# Patient Record
Sex: MALE | Race: WHITE | NOT HISPANIC OR LATINO | Employment: FULL TIME | URBAN - NONMETROPOLITAN AREA
[De-identification: names, ages, dates, MRNs, and addresses within clinical notes are randomized per-mention and may not be internally consistent; named-entity substitution may affect disease eponyms.]

---

## 2017-01-12 ENCOUNTER — OFFICE VISIT (OUTPATIENT)
Dept: ORTHOPEDIC SURGERY | Facility: CLINIC | Age: 32
End: 2017-01-12

## 2017-01-12 DIAGNOSIS — M67.921 TENDINOPATHY OF ELBOW, RIGHT: Primary | ICD-10-CM

## 2017-01-12 PROCEDURE — 99213 OFFICE O/P EST LOW 20 MIN: CPT | Performed by: ORTHOPAEDIC SURGERY

## 2017-01-12 NOTE — PROGRESS NOTES
Chief Complaint   Patient presents with   • Right Elbow - Follow-up           HPI  Patient is here for a follow up of his right elbow.  He states he is hoping to be able to go back to work.  He has had a lateral elbow reconstruction with resection of the granulation tissue and reconstruction of the extensor, supinator muscle mass.  He is doing well post reconstruction.  His pain is settled down nicely.  He has a near normal range of motion.  He can flex his elbow from 0-130°.  Pronation 0-90°.  Supination 0-90°.  Her vascular compromise.        There were no vitals filed for this visit.        Review of Systems   Constitutional: Negative for chills, diaphoresis, fever and unexpected weight change.   HENT: Negative for hearing loss, nosebleeds, sore throat and tinnitus.    Eyes: Negative for pain and visual disturbance.   Respiratory: Negative for cough, shortness of breath and wheezing.    Cardiovascular: Negative for chest pain and palpitations.   Gastrointestinal: Negative for abdominal pain, diarrhea, nausea and vomiting.   Endocrine: Negative for cold intolerance, heat intolerance and polydipsia.   Genitourinary: Negative for difficulty urinating, dysuria and hematuria.   Musculoskeletal: Negative for arthralgias, joint swelling and myalgias.   Skin: Negative for rash and wound.   Allergic/Immunologic: Negative for environmental allergies.   Neurological: Negative for dizziness, syncope and numbness.   Hematological: Does not bruise/bleed easily.   Psychiatric/Behavioral: Negative for dysphoric mood and sleep disturbance. The patient is not nervous/anxious.            Physical Exam   Constitutional: He appears well-developed.   HENT:   Head: Normocephalic.   Nose: Nose normal.   Eyes: Conjunctivae are normal. Pupils are equal, round, and reactive to light.   Neck: Normal range of motion.   Cardiovascular: Normal rate and intact distal pulses.    Pulmonary/Chest: Effort normal and breath sounds normal.    Abdominal: Soft. Bowel sounds are normal.   Musculoskeletal: Normal range of motion.   Neurological: He is alert. He has normal reflexes.   Skin: Skin is warm and dry.   Psychiatric: He has a normal mood and affect. His behavior is normal. Judgment and thought content normal.   Vitals reviewed.          Joint/Body Part Specific Exam:  Right elbow:right elbow. lateral Relationship of 3 bony points is well preserved.  The laterally based incision is completely healed.  There is no evidence of posterior interosseous nerve palsy. Mild effusion is noted of the elbow. There is no ulnar neuritis. Patient has a lot of pain and tenderness over the lateral/medial aspect of the elbow. Range of motion of the elbow is 0-130 degrees of flexion, 0-90 degrees of pronation, 0-90 degrees of supination. Extension of the wrist against resistance bothers the patient significantly. Radial artery pulses are palpable. Skin and soft tissues are slightly swollen. There is point tenderness over the flexor pronator muscle mass/extensor supinator muscle mass.      X-RAY Report:        Diagnostics:        Juan M was seen today for follow-up.    Diagnoses and all orders for this visit:    Tendinopathy of elbow, right            Procedures        Plan:  Home based exercise program with stretching and strengthening of the elbow.  Work on biceps and triceps strengthening.  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  Avoid repetitive loading and reinjury precautions at work.  Use a brace if needed.  Follow-up in my office in 3 months.  Tablet ibuprofen 6 mg tab 1 by mouth twice a day when necessary pain.  Okay to apply a local topical analgesic gel if needed for pain and inflammation.

## 2017-01-12 NOTE — MR AVS SNAPSHOT
Juan M Lynch   2017 1:15 PM   Office Visit    Dept Phone:  [phone]   Encounter #:  [id number]    Provider:  Red Gross MD   Department:  [facility]                Your Full Care Plan              Your Updated Medication List          This list is accurate as of: 17  2:23 PM.  Always use your most recent med list.                naproxen sodium 220 MG tablet   Commonly known as:  ALEVE               You Were Diagnosed With        Codes Comments    Tendinopathy of elbow, right    -  Primary ICD-10-CM: M67.921  ICD-9-CM: 727.9       Instructions     None    Patient Instructions History      Upcoming Appointments     Visit Type Date Time Department    FOLLOW UP 2017  1:15 PM [facility]    FOLLOW UP 2017  1:45 PM [facility]      Healthcare IThart Signup     [facility] [employer] allows you to send messages to your doctor, view your test results, renew your prescriptions, schedule appointments, and more. To sign up, go to [employer]ove and click on the Sign Up Now link in the New User? box. Enter your [employer] Activation Code exactly as it appears below along with the last four digits of your Social Security Number and your Date of Birth () to complete the sign-up process. If you do not sign up before the expiration date, you must request a new code.    [employer] Activation Code: [email or web address]  Expires: 2017  2:23 PM    If you have questions, you can email [email or web address] or call [phone] to talk to our [employer] staff. Remember, [employer] is NOT to be used for urgent needs. For medical emergencies, dial 911.               Other Info from Your Visit           Your Appointments     2017  1:45 PM EDT   Follow Up with Red Gross MD   [facility] (--)    [address] [place] [address] [address]  [facility] [address]      [phone]           Arrive 15 minutes prior to appointment.              Allergies     Guaifenesin        Reason for Visit     Right Elbow - Follow-up           Vital Signs     Smoking Status                   Never Smoker           Problems and Diagnoses Noted     Tendinopathy of elbow

## 2020-06-19 ENCOUNTER — OFFICE VISIT CONVERTED (OUTPATIENT)
Dept: FAMILY MEDICINE CLINIC | Age: 35
End: 2020-06-19
Attending: NURSE PRACTITIONER

## 2021-07-02 VITALS
DIASTOLIC BLOOD PRESSURE: 61 MMHG | HEART RATE: 68 BPM | SYSTOLIC BLOOD PRESSURE: 109 MMHG | HEIGHT: 64 IN | BODY MASS INDEX: 24.89 KG/M2 | WEIGHT: 145.8 LBS | TEMPERATURE: 97.6 F

## 2021-07-23 ENCOUNTER — OFFICE VISIT (OUTPATIENT)
Dept: FAMILY MEDICINE CLINIC | Age: 36
End: 2021-07-23

## 2021-07-23 VITALS
WEIGHT: 150 LBS | BODY MASS INDEX: 25.61 KG/M2 | HEART RATE: 64 BPM | DIASTOLIC BLOOD PRESSURE: 91 MMHG | TEMPERATURE: 98.2 F | SYSTOLIC BLOOD PRESSURE: 140 MMHG | HEIGHT: 64 IN

## 2021-07-23 DIAGNOSIS — M25.511 CHRONIC RIGHT SHOULDER PAIN: ICD-10-CM

## 2021-07-23 DIAGNOSIS — G89.29 CHRONIC RIGHT SHOULDER PAIN: ICD-10-CM

## 2021-07-23 DIAGNOSIS — R68.84 JAW PAIN: ICD-10-CM

## 2021-07-23 DIAGNOSIS — R53.83 OTHER FATIGUE: Primary | ICD-10-CM

## 2021-07-23 PROCEDURE — 99213 OFFICE O/P EST LOW 20 MIN: CPT | Performed by: NURSE PRACTITIONER

## 2021-07-23 RX ORDER — IBUPROFEN 200 MG
200 TABLET ORAL EVERY 6 HOURS PRN
COMMUNITY
End: 2022-08-01

## 2021-07-23 NOTE — ASSESSMENT & PLAN NOTE
Exam and history consistent with temporomandibular joint syndrome.  I do think it is okay to complete the steroid prescription from his dentist Dr. Mosqueda well.  Flexeril is also okay to use but it may cause drowsiness and he may choose to take it at bedtime.  Avoid chewing gum and follow-up if not improving.

## 2021-07-23 NOTE — PROGRESS NOTES
Chief Complaint  Jaw Pain (x a few weeks), GI Problem (frequent bowel movements, after certain foods), Fatigue, and Shoulder Pain (right )    Subjective  Juan M is a 36-year-old male here today for complaints of fatigue for more than 6 months.  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  He does not enjoy his work environment and work is stressful.  Amount of sleep has not really changed but he is wondering if maybe he should be tested for sleep apnea not aware of any distinct snoring to his knowledge.  He denies any specific depression or anxiety but does admit to increased stress level.  In addition he fell in the snow in February and since that time has had some it ongoing right shoulder pain and he feels with certain movements that the shoulder crackles and pops.  He would like to know what would be the best intervention to further evaluate this shoulder pain.  He has not had any x-ray imaging done.  Also he slept on an air mattress 2 weeks ago and since that time he has had some pain along the left jaw and he noted upon awakening that his jaw had been hyperextended while he was asleep.  He did see Rahul garcia on Monday and received a prescription for steroids and Flexeril.  Patient has not started those prescriptions he wanted a second opinion on if they were necessary.        Juan M Lynch presents to [facility]    Review of Systems   Constitutional: Positive for fatigue. Negative for chills and fever.   Respiratory: Negative.    Cardiovascular: Negative.    Musculoskeletal:        Left pain along the temporomandibular joint x2 weeks and intermittent right shoulder pain since February   Neurological: Negative.    Psychiatric/Behavioral: Positive for sleep disturbance.        Increased stress level        Objective   Vital Signs:   Vitals:    07/23/21 1128 07/23/21 1133   BP: 139/91 140/91   BP Location: Right arm Right arm   Patient Position:  "Sitting Sitting   Pulse: 64 64   Temp: 98.2 °F (36.8 °C)    TempSrc: Oral    Weight: 68 kg (150 lb)    Height: 162.6 cm (64\")    PainSc: 0-No pain       Physical Exam  Vitals reviewed.   Constitutional:       General: He is not in acute distress.     Appearance: Normal appearance. He is well-developed.   HENT:      Right Ear: Tympanic membrane normal.      Left Ear: Tympanic membrane normal.   Cardiovascular:      Rate and Rhythm: Normal rate and regular rhythm.      Heart sounds: Normal heart sounds.   Pulmonary:      Effort: Pulmonary effort is normal.      Breath sounds: Normal breath sounds.   Musculoskeletal:      Right lower leg: No edema.      Left lower leg: No edema.      Comments: Full range of motion with right shoulder but mild crepitus noted.  And mild tenderness noted along the upper left temporomandibular joint with opening and closing of his jaw   Skin:     General: Skin is warm and dry.   Neurological:      General: No focal deficit present.      Mental Status: He is alert.   Psychiatric:         Attention and Perception: Attention normal.         Mood and Affect: Mood and affect normal.         Behavior: Behavior normal.          Result Review :              Assessment and Plan    Diagnoses and all orders for this visit:    1. Other fatigue (Primary)  Assessment & Plan:  Recommend labs and okay to refer for sleep study if labs are negative.  Also monitor for symptoms of anxiety or depression as those could also cause fatigue.  Further treatment pending lab results and follow-up if not improving    Orders:  -     CBC & Differential  -     Comprehensive Metabolic Panel  -     TSH  -     Vitamin B12  -     Testosterone    2. Chronic right shoulder pain  Assessment & Plan:  Rest and ice.  X-rays pending.  Steroid taking for temporomandibular joint syndrome may ease his shoulder symptoms as well.  Further treatment pending x-ray results    Orders:  -     XR Shoulder 2+ View Right; Future    3. Jaw " pain  Assessment & Plan:  Exam and history consistent with temporomandibular joint syndrome.  I do think it is okay to complete the steroid prescription from his dentist Dr. Mosqueda well.  Flexeril is also okay to use but it may cause drowsiness and he may choose to take it at bedtime.  Avoid chewing gum and follow-up if not improving.        Follow Up    No follow-ups on file.  Patient was given instructions and counseling regarding his condition or for health maintenance advice. Please see specific information pulled into the AVS if appropriate.

## 2021-07-23 NOTE — ASSESSMENT & PLAN NOTE
Recommend labs and okay to refer for sleep study if labs are negative.  Also monitor for symptoms of anxiety or depression as those could also cause fatigue.  Further treatment pending lab results and follow-up if not improving

## 2021-07-23 NOTE — ASSESSMENT & PLAN NOTE
Rest and ice.  X-rays pending.  Steroid taking for temporomandibular joint syndrome may ease his shoulder symptoms as well.  Further treatment pending x-ray results

## 2021-07-26 ENCOUNTER — TELEPHONE (OUTPATIENT)
Dept: FAMILY MEDICINE CLINIC | Age: 36
End: 2021-07-26

## 2021-07-26 ENCOUNTER — HOSPITAL ENCOUNTER (OUTPATIENT)
Dept: GENERAL RADIOLOGY | Facility: HOSPITAL | Age: 36
Discharge: HOME | End: 2021-07-26

## 2021-07-26 ENCOUNTER — LAB (OUTPATIENT)
Dept: LAB | Facility: HOSPITAL | Age: 36
End: 2021-07-26

## 2021-07-26 DIAGNOSIS — M25.511 CHRONIC RIGHT SHOULDER PAIN: ICD-10-CM

## 2021-07-26 DIAGNOSIS — G89.29 CHRONIC RIGHT SHOULDER PAIN: ICD-10-CM

## 2021-07-26 DIAGNOSIS — R53.83 OTHER FATIGUE: Primary | ICD-10-CM

## 2021-07-26 LAB
ALBUMIN SERPL-MCNC: 4.3 G/DL (ref 3.5–5.2)
ALBUMIN/GLOB SERPL: 1.9 G/DL
ALP SERPL-CCNC: 66 U/L (ref 39–117)
ALT SERPL W P-5'-P-CCNC: 37 U/L (ref 1–41)
ANION GAP SERPL CALCULATED.3IONS-SCNC: 9.5 MMOL/L (ref 5–15)
AST SERPL-CCNC: 26 U/L (ref 1–40)
BASOPHILS # BLD AUTO: 0.03 10*3/MM3 (ref 0–0.2)
BASOPHILS NFR BLD AUTO: 0.6 % (ref 0–1.5)
BILIRUB SERPL-MCNC: 0.3 MG/DL (ref 0–1.2)
BUN SERPL-MCNC: 16 MG/DL (ref 6–20)
BUN/CREAT SERPL: 17 (ref 7–25)
CALCIUM SPEC-SCNC: 8.9 MG/DL (ref 8.6–10.5)
CHLORIDE SERPL-SCNC: 105 MMOL/L (ref 98–107)
CO2 SERPL-SCNC: 26.5 MMOL/L (ref 22–29)
CREAT SERPL-MCNC: 0.94 MG/DL (ref 0.76–1.27)
DEPRECATED RDW RBC AUTO: 43.3 FL (ref 37–54)
EOSINOPHIL # BLD AUTO: 0.27 10*3/MM3 (ref 0–0.4)
EOSINOPHIL NFR BLD AUTO: 5.5 % (ref 0.3–6.2)
ERYTHROCYTE [DISTWIDTH] IN BLOOD BY AUTOMATED COUNT: 13.1 % (ref 12.3–15.4)
GFR SERPL CREATININE-BSD FRML MDRD: 90 ML/MIN/1.73
GLOBULIN UR ELPH-MCNC: 2.3 GM/DL
GLUCOSE SERPL-MCNC: 122 MG/DL (ref 65–99)
HCT VFR BLD AUTO: 42.3 % (ref 37.5–51)
HGB BLD-MCNC: 14.5 G/DL (ref 13–17.7)
HOLD SPECIMEN: NORMAL
IMM GRANULOCYTES # BLD AUTO: 0.01 10*3/MM3 (ref 0–0.05)
IMM GRANULOCYTES NFR BLD AUTO: 0.2 % (ref 0–0.5)
LYMPHOCYTES # BLD AUTO: 1.74 10*3/MM3 (ref 0.7–3.1)
LYMPHOCYTES NFR BLD AUTO: 35.4 % (ref 19.6–45.3)
MCH RBC QN AUTO: 30.7 PG (ref 26.6–33)
MCHC RBC AUTO-ENTMCNC: 34.3 G/DL (ref 31.5–35.7)
MCV RBC AUTO: 89.4 FL (ref 79–97)
MONOCYTES # BLD AUTO: 0.37 10*3/MM3 (ref 0.1–0.9)
MONOCYTES NFR BLD AUTO: 7.5 % (ref 5–12)
NEUTROPHILS NFR BLD AUTO: 2.49 10*3/MM3 (ref 1.7–7)
NEUTROPHILS NFR BLD AUTO: 50.8 % (ref 42.7–76)
PLATELET # BLD AUTO: 219 10*3/MM3 (ref 140–450)
PMV BLD AUTO: 9.6 FL (ref 6–12)
POTASSIUM SERPL-SCNC: 4.1 MMOL/L (ref 3.5–5.2)
PROT SERPL-MCNC: 6.6 G/DL (ref 6–8.5)
RBC # BLD AUTO: 4.73 10*6/MM3 (ref 4.14–5.8)
SODIUM SERPL-SCNC: 141 MMOL/L (ref 136–145)
TESTOST SERPL-MCNC: 375 NG/DL (ref 249–836)
TSH SERPL DL<=0.05 MIU/L-ACNC: 0.57 UIU/ML (ref 0.27–4.2)
VIT B12 BLD-MCNC: 514 PG/ML (ref 211–946)
WBC # BLD AUTO: 4.91 10*3/MM3 (ref 3.4–10.8)

## 2021-07-26 PROCEDURE — 82607 VITAMIN B-12: CPT | Performed by: NURSE PRACTITIONER

## 2021-07-26 PROCEDURE — 84403 ASSAY OF TOTAL TESTOSTERONE: CPT | Performed by: NURSE PRACTITIONER

## 2021-07-26 PROCEDURE — 73030 X-RAY EXAM OF SHOULDER: CPT

## 2021-07-26 PROCEDURE — 36415 COLL VENOUS BLD VENIPUNCTURE: CPT

## 2021-07-26 PROCEDURE — 84443 ASSAY THYROID STIM HORMONE: CPT | Performed by: NURSE PRACTITIONER

## 2021-07-26 PROCEDURE — 85025 COMPLETE CBC W/AUTO DIFF WBC: CPT | Performed by: NURSE PRACTITIONER

## 2021-07-26 PROCEDURE — 80053 COMPREHEN METABOLIC PANEL: CPT | Performed by: NURSE PRACTITIONER

## 2021-07-27 NOTE — PROGRESS NOTES
Some arthritic / degenerative change of ac joint of shoulder (acromioclavicular joint).  PT is advised for evaluation.  Then if not improving with PT will need to refer to ortho.

## 2021-07-27 NOTE — PROGRESS NOTES
Normal testosterone, thyroid, kidney and liver function.  Glucose mildly elevated but may not have been a fasting specimen.  Suggest repeating a fasting glucose level in the future.  You are not anemic.  Vitamin b12 level is normal.

## 2021-07-30 ENCOUNTER — TELEPHONE (OUTPATIENT)
Dept: FAMILY MEDICINE CLINIC | Age: 36
End: 2021-07-30

## 2021-07-30 DIAGNOSIS — R19.7 DIARRHEA, UNSPECIFIED TYPE: Primary | ICD-10-CM

## 2021-08-02 NOTE — TELEPHONE ENCOUNTER
Ok to refer to ortho of his choice for evaluation of right shoulder pain.  Regarding IBS concerns,  it often focuses on learning triggers of IBS.  Whether food or stress, etc.  Consider food allergy panel and celiac panel to start looking for food triggers.

## 2021-08-03 ENCOUNTER — TELEPHONE (OUTPATIENT)
Dept: FAMILY MEDICINE CLINIC | Age: 36
End: 2021-08-03

## 2021-08-03 DIAGNOSIS — M25.511 CHRONIC RIGHT SHOULDER PAIN: Primary | ICD-10-CM

## 2021-08-03 DIAGNOSIS — G89.29 CHRONIC RIGHT SHOULDER PAIN: Primary | ICD-10-CM

## 2021-08-03 NOTE — TELEPHONE ENCOUNTER
Pt calling and wanting to know what next steps it is that he needs to take to maybe diagnose IBS, since all labs came back ok. Pt states is still not feeling any better, wants to know what x-ray said about his shoulder and can we refer him somewhere and skip PT, also what about jaw/ad

## 2021-08-03 NOTE — TELEPHONE ENCOUNTER
Xray of shoulder stated normal except for thickening of ac joint capsule which PT could help.  However, per request will go ahead and refer to ortho instead.  IBS involves learning triggers for symptoms and could include testing for food allergies or celiac.  Other testing would be necessary for investigation of triggers or could refer to GI for evaluation.    Regarding his jaw and TMJ,  verify that dentist performed xray at time of evaluation.  Consider using mouthguard, PT, stress management or follow up with dentist.  If by chance he did not have xray done , I will order xray.

## 2021-08-04 NOTE — TELEPHONE ENCOUNTER
Pt wants to have labs for celiac and food allergies.  He wants to come in tomorrow am because its his only day off.  Pended order

## 2021-08-04 NOTE — TELEPHONE ENCOUNTER
Pt states that his dentist did an xray.  He doesn't have a preference on orthos as long as they take his insurance and are in [facility]

## 2021-08-05 ENCOUNTER — LAB (OUTPATIENT)
Dept: LAB | Facility: HOSPITAL | Age: 36
End: 2021-08-05

## 2021-08-05 DIAGNOSIS — R19.7 DIARRHEA, UNSPECIFIED TYPE: ICD-10-CM

## 2021-08-05 PROCEDURE — 86003 ALLG SPEC IGE CRUDE XTRC EA: CPT

## 2021-08-05 PROCEDURE — 82784 ASSAY IGA/IGD/IGG/IGM EACH: CPT

## 2021-08-05 PROCEDURE — 83516 IMMUNOASSAY NONANTIBODY: CPT

## 2021-08-05 PROCEDURE — 86255 FLUORESCENT ANTIBODY SCREEN: CPT

## 2021-08-05 PROCEDURE — 36415 COLL VENOUS BLD VENIPUNCTURE: CPT

## 2021-08-06 LAB
ENDOMYSIUM IGA SER QL: NEGATIVE
IGA SERPL-MCNC: 231 MG/DL (ref 90–386)
TTG IGA SER-ACNC: <2 U/ML (ref 0–3)

## 2021-08-08 LAB
CLAM IGE QN: <0.1 KU/L
CODFISH IGE QN: <0.1 KU/L
CONV CLASS DESCRIPTION: ABNORMAL
CORN IGE QN: <0.1 KU/L
COW MILK IGE QN: <0.1 KU/L
EGG WHITE IGE QN: <0.1 KU/L
PEANUT IGE QN: <0.1 KU/L
SCALLOP IGE QN: 0.1 KU/L
SESAME SEED IGE QN: <0.1 KU/L
SHRIMP IGE QN: <0.1 KU/L
SOYBEAN IGE QN: <0.1 KU/L
WALNUT IGE QN: <0.1 KU/L
WHEAT IGE QN: 0.17 KU/L

## 2021-08-09 NOTE — PROGRESS NOTES
Allergic to scallops and wheat.  Does not have celiac disease.  If concern for irritable bowel symptoms does not improve with avoidance of scallops and wheat suggest referral to gastroenterologist.

## 2021-09-20 ENCOUNTER — OFFICE VISIT (OUTPATIENT)
Dept: ORTHOPEDIC SURGERY | Facility: CLINIC | Age: 36
End: 2021-09-20

## 2021-09-20 VITALS — HEIGHT: 66 IN | WEIGHT: 150 LBS | BODY MASS INDEX: 24.11 KG/M2 | TEMPERATURE: 97.8 F

## 2021-09-20 DIAGNOSIS — M25.511 CHRONIC RIGHT SHOULDER PAIN: ICD-10-CM

## 2021-09-20 DIAGNOSIS — R20.2 PARESTHESIAS IN RIGHT HAND: Primary | ICD-10-CM

## 2021-09-20 DIAGNOSIS — G89.29 CHRONIC RIGHT SHOULDER PAIN: ICD-10-CM

## 2021-09-20 PROCEDURE — 99204 OFFICE O/P NEW MOD 45 MIN: CPT | Performed by: PHYSICIAN ASSISTANT

## 2021-09-20 NOTE — PROGRESS NOTES
"Chief Complaint  Pain of the Right Shoulder and Establish Care    Subjective    History of Present Illness      Juan M Lynch is a 36 y.o. male who presents to [facility] for complaint of right shoulder pain since end of February secondary to a fall directly onto the shoulder. He reports he did not have insurance when the accident happened. He was able to see his PCP several months later and have xrays performed. He states he is experiencing weakness and pain with laying on the shoulder and with lifting objects away from his body.  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  Rest of the arm does seem to help symptoms.  His pain is described as a crushing/grinding pain that is rated at 8/10.  He also reports numbness and tingling in the right hand, all fingers, since fall and injury to the right shoulder.        Objective   Vital Signs:   Temp 97.8 °F (36.6 °C)   Ht 167.6 cm (66\")   Wt 68 kg (150 lb)   BMI 24.21 kg/m²     Physical Exam  Vitals signs and nursing note reviewed.   Constitutional:       Appearance: Normal appearance.   Pulmonary:      Effort: Pulmonary effort is normal.   Skin:     General: Skin is warm and dry.      Capillary Refill: Capillary refill takes less than 2 seconds.   Neurological:      General: No focal deficit present.      Mental Status: He is alert and oriented to person, place, and time. Mental status is at baseline.   Psychiatric:         Mood and Affect: Mood normal.         Behavior: Behavior normal.         Thought Content: Thought content normal.         Judgment: Judgment normal.     Ortho Exam   RIGHT shoulder  Positive for tenderness at the anterior aspect of the shoulder and at the insertion of the rotator cuff over the greater tuberosity of the humerus.   Positive for tenderness with palpation of the AC joint.  Soft tissue tenderness is noted.   Forward flexion is 0-120 degrees, abduction is 0-100 degrees, external rotation is 0-40 " degrees.   Positive for decreased strength in abduction and external rotation.   Crossover adduction test is positive.    Drop arm sign is positive for pain.  Sulcus sign is negative.   Neer test is positive on compression.  The pain level is 8.  There is no evidence of multidirectional instability.        Result Review :   Radiologic studies - see below for interpretation  RIGHT shoulder xrays 4 views were performed at [facility] on 7/26/21. Images were independently viewed and interpreted by myself, my impression as follows:  · Thickening of superior ac joint, changes to the distal clavicle of unsure significance, glenohumeral joint well preserved        Assessment   Assessment and Plan    Problem List Items Addressed This Visit        Musculoskeletal and Injuries    Chronic right shoulder pain    Relevant Orders    MRI shoulder right wo contrast    EMG & Nerve Conduction Test      Other Visit Diagnoses     Paresthesias in right hand    -  Primary    Relevant Orders    EMG & Nerve Conduction Test            Follow Up   · Discussion of any imaging in detail. Discussion of orthopaedic goals.  · Risk, benefits, and merits of treatment alternatives reviewed with the patient. Treatment alternatives include: intra-articular steroid injection and further imaging/testing  · Ice, heat, and/or modalities as beneficial   · To schedule MRI of right shoulder to fully assess AC joint and rule out rotator cuff tear  · Patient is encouraged to call or return for any issues or concerns.  · Follow up will be based on when MRI has been performed  • Patient was given instructions and counseling regarding his condition or for health maintenance advice. Please see specific information pulled into the AVS if appropriate.     Boby Jerome PA-C   Date of Encounter: 9/20/2021   Electronically signed by Boby Jerome PA-C, 09/20/21, 6:01 AM EDT.   Electronically signed by Boby Jerome PA-C,  09/20/21, 11:51 AM EDT.      IRISH Dragon/Transcription disclaimer:  Much of this encounter note is an electronic transcription/translation of spoken language to printed text. The electronic translation of spoken language may permit erroneous, or at times, nonsensical words or phrases to be inadvertently transcribed; Although I have reviewed the note for such errors, some may still exist.

## 2021-10-05 ENCOUNTER — TELEPHONE (OUTPATIENT)
Dept: ORTHOPEDIC SURGERY | Facility: CLINIC | Age: 36
End: 2021-10-05

## 2021-10-05 DIAGNOSIS — F40.240 CLAUSTROPHOBIA: Primary | ICD-10-CM

## 2021-10-05 NOTE — TELEPHONE ENCOUNTER
PATIENT STATES MRI WAS DENIED AND THAT WE NEED TO CONTACT THE INSURANCE TO TRY TO GET IT APPROVED.   I WILL GET INTO THE PORTAL AND SEE WHAT I CAN DO  [phone]

## 2021-10-06 RX ORDER — DIAZEPAM 5 MG/1
TABLET ORAL
Qty: 2 TABLET | Refills: 0 | Status: SHIPPED | OUTPATIENT
Start: 2021-10-06 | End: 2022-01-27

## 2021-10-13 ENCOUNTER — HOSPITAL ENCOUNTER (OUTPATIENT)
Dept: MRI IMAGING | Facility: HOSPITAL | Age: 36
Discharge: HOME | End: 2021-10-13
Admitting: PHYSICIAN ASSISTANT

## 2021-10-13 DIAGNOSIS — G89.29 CHRONIC RIGHT SHOULDER PAIN: ICD-10-CM

## 2021-10-13 DIAGNOSIS — M25.511 CHRONIC RIGHT SHOULDER PAIN: ICD-10-CM

## 2021-10-13 PROCEDURE — 73221 MRI JOINT UPR EXTREM W/O DYE: CPT

## 2021-10-15 ENCOUNTER — OFFICE VISIT (OUTPATIENT)
Dept: ORTHOPEDIC SURGERY | Facility: CLINIC | Age: 36
End: 2021-10-15

## 2021-10-15 ENCOUNTER — LAB (OUTPATIENT)
Dept: LAB | Facility: HOSPITAL | Age: 36
End: 2021-10-15

## 2021-10-15 VITALS — BODY MASS INDEX: 24.11 KG/M2 | TEMPERATURE: 98.5 F | HEIGHT: 66 IN | WEIGHT: 150 LBS

## 2021-10-15 DIAGNOSIS — R20.2 PARESTHESIAS IN RIGHT HAND: Primary | ICD-10-CM

## 2021-10-15 DIAGNOSIS — M25.511 CHRONIC RIGHT SHOULDER PAIN: Primary | ICD-10-CM

## 2021-10-15 DIAGNOSIS — M89.511 OSTEOLYSIS OF ACROMIAL END OF RIGHT CLAVICLE: ICD-10-CM

## 2021-10-15 DIAGNOSIS — G89.29 CHRONIC RIGHT SHOULDER PAIN: Primary | ICD-10-CM

## 2021-10-15 DIAGNOSIS — R20.2 PARESTHESIAS IN RIGHT HAND: ICD-10-CM

## 2021-10-15 LAB
25(OH)D3 SERPL-MCNC: 35 NG/ML (ref 30–100)
CALCIUM SPEC-SCNC: 9.2 MG/DL (ref 8.6–10.5)
PTH-INTACT SERPL-MCNC: 57.1 PG/ML (ref 15–65)

## 2021-10-15 PROCEDURE — 82310 ASSAY OF CALCIUM: CPT

## 2021-10-15 PROCEDURE — 99214 OFFICE O/P EST MOD 30 MIN: CPT | Performed by: PHYSICIAN ASSISTANT

## 2021-10-15 PROCEDURE — 36415 COLL VENOUS BLD VENIPUNCTURE: CPT

## 2021-10-15 PROCEDURE — 82306 VITAMIN D 25 HYDROXY: CPT

## 2021-10-15 PROCEDURE — 83970 ASSAY OF PARATHORMONE: CPT

## 2021-10-15 RX ORDER — TRAMADOL HYDROCHLORIDE 50 MG/1
TABLET ORAL
Qty: 40 TABLET | Refills: 0 | Status: SHIPPED | OUTPATIENT
Start: 2021-10-15 | End: 2022-01-27

## 2021-10-15 RX ORDER — PREDNISONE 20 MG/1
20 TABLET ORAL SEE ADMIN INSTRUCTIONS
Qty: 9 TABLET | Refills: 0 | Status: CANCELLED | OUTPATIENT
Start: 2021-10-15

## 2021-10-15 RX ORDER — TRAMADOL HYDROCHLORIDE 50 MG/1
50 TABLET ORAL SEE ADMIN INSTRUCTIONS
Qty: 30 TABLET | Refills: 0 | Status: CANCELLED | OUTPATIENT
Start: 2021-10-15

## 2021-10-15 RX ORDER — PREDNISONE 20 MG/1
TABLET ORAL
Qty: 9 TABLET | Refills: 0 | Status: SHIPPED | OUTPATIENT
Start: 2021-10-15 | End: 2022-01-27

## 2021-10-15 NOTE — PROGRESS NOTES
"Chief Complaint  Pain and Follow-up of the Right Shoulder    Subjective    History of Present Illness {  Problem List  Visit Diagnosis   Encounters  Notes  Medications  Labs  Result Review Imaging  Media :23}     Juan M Lynch is a 36 y.o. male who presents to [facility] for {Stucker New or Follow up Problem:00428}        Objective   Vital Signs:   Temp 98.5 °F (36.9 °C)   Ht 167.6 cm (66\")   Wt 68 kg (150 lb)   BMI 24.21 kg/m²     Physical Exam  Vitals signs and nursing note reviewed.   Constitutional:       Appearance: Normal appearance.   Pulmonary:      Effort: Pulmonary effort is normal.   Skin:     General: Skin is warm and dry.      Capillary Refill: Capillary refill takes less than 2 seconds.   Neurological:      General: No focal deficit present.      Mental Status: He is alert and oriented to person, place, and time. Mental status is at baseline.   Psychiatric:         Mood and Affect: Mood normal.         Behavior: Behavior normal.         Thought Content: Thought content normal.         Judgment: Judgment normal.     Ortho Exam   {[facility] body part:56294}  {[facility] MS Exams for Injection:69105}      Result Review :{ Labs  Result Review  Imaging  Med Tab  Media :23}   {Data reviewed (optional):18606}  {Diagnostics options AS:56889}          PROCEDURE  Procedures           Assessment   Assessment and Plan { Problem List  Visit Diagnosis  ROS  Review (Popup)  Health Maintenance  Quality  BestPractice  Medications  SmartSets  SnapShot Encounters  Media :23}   Problem List Items Addressed This Visit     None        {Time Spent (Optional):76525}  Follow Up {Instructions Charge Capture  Follow-up Communications :23}  · {StuckerTC Fx Care Advice:13644::\"Discussion of any imaging in detail. Discussion of orthopaedic goals.\",\"Ice, heat, and/or modalities as beneficial\"}  · {16TC Recommendations/Plan:57395::\"Patient is encouraged to call or return " "for any issues or concerns.\"}  · {QueenieTC Brace (Optional):42514::\"No brace was given at today's visit.\"}  · {Queenie Follow Up:26043}  • Patient was given instructions and counseling regarding his condition or for health maintenance advice. Please see specific information pulled into the AVS if appropriate.     Mirtha Fenton MA   Date of Encounter: 10/15/2021   Electronically signed by Mirtha Fenton MA, 10/15/21, 9:35 AM EDT.     EMR Dragon/Transcription disclaimer:  Much of this encounter note is an electronic transcription/translation of spoken language to printed text. The electronic translation of spoken language may permit erroneous, or at times, nonsensical words or phrases to be inadvertently transcribed; Although I have reviewed the note for such errors, some may still exist.   "

## 2021-10-21 ENCOUNTER — TELEPHONE (OUTPATIENT)
Dept: ORTHOPEDIC SURGERY | Facility: CLINIC | Age: 36
End: 2021-10-21

## 2021-10-21 NOTE — TELEPHONE ENCOUNTER
PATIENT WANTS LISS TO CALL HIM  REGARDING HIS EMG RESULTS.     HE ALSO SAID TRAMADOL AND STEROIDS ARE NOT HELPING AND WANTS SOME STRONGER OR NEEDS TO BE SEEN ASAP    I TOLD THE PATIENT I WILL SEND A MESSAGE BUT I COULD NOT MAKE HIM ANOTHER  APPT AT THIS TIME. HE SHOULD WAIT TO HEAR BACK FROM OUR OFFICE

## 2021-10-25 ENCOUNTER — TELEPHONE (OUTPATIENT)
Dept: ORTHOPEDIC SURGERY | Facility: CLINIC | Age: 36
End: 2021-10-25

## 2021-10-25 NOTE — TELEPHONE ENCOUNTER
Caller: Juan M Lynch    Relationship: Self    Best call back number: [phone]    Caller requesting test results: PATIENT    What test was performed: EMG    When was the test performed: 10-14-21    Where was the test performed: [place] [facility]    Additional notes:  PATIENT IS FOLLOWING UP ON ORIGINAL MESSAGE LEFT ON Thursday 10-21-21,  WOULD LIKE A CALL BACK TO DISCUSS EMG TEST RESULTS.

## 2021-10-27 ENCOUNTER — TELEPHONE (OUTPATIENT)
Dept: ORTHOPEDIC SURGERY | Facility: CLINIC | Age: 36
End: 2021-10-27

## 2021-10-27 DIAGNOSIS — M25.511 CHRONIC RIGHT SHOULDER PAIN: ICD-10-CM

## 2021-10-27 DIAGNOSIS — G89.29 CHRONIC RIGHT SHOULDER PAIN: ICD-10-CM

## 2021-10-27 DIAGNOSIS — R20.2 PARESTHESIAS IN RIGHT HAND: Primary | ICD-10-CM

## 2021-10-27 NOTE — TELEPHONE ENCOUNTER
Called to notify patient that I have spoken with Dr. Gross regarding his EMG and MRI results.  Left a message advising him to return call to the office based on his preferences listed under media (he prefers not to talk to someone regarding his care rather than us leaving a voicemail).  My message to him advised him I am out of the office on Thursday but will leave a message within his chart so someone can help pass information along to him.        IF HE RETURNS MY CALL PLEASE HAVE AN MA PASS ALONG THE INFORMATION BELOW:  His labs were normal and do not indicate an underlying cause of parathyroid issues for the x-ray and MRI findings.  Unfortunately his EMG does not explain why he is having numbness and tingling in the right hand.  The results showed that it was a technically difficult exam because of pain and guarding and their findings were essentially normal.  I do feel some of his shoulder pain could still be related to the findings at the end of the clavicle, that show arthritis, but it does not explain his other symptoms.  Based on that I am concerned that he could potentially have what is called thoracic outlet syndrome although it can be difficult to test for and we do not treat that.  Dr. Gross recommended a referral to a thoracic surgeon within [facility] that may be able to evaluate for this condition and for second opinion regarding the symptoms. I will place a referral to Dr. Gasca.

## 2021-10-28 ENCOUNTER — TELEPHONE (OUTPATIENT)
Dept: ORTHOPEDIC SURGERY | Facility: CLINIC | Age: 36
End: 2021-10-28

## 2021-10-28 NOTE — TELEPHONE ENCOUNTER
"I have called the patient and left him a voicemail letting him know I have spoken with Dr. Gasca and he will see the patient. There was a misunderstanding with scheduling since I had put on the order \"shoulder pain\" but the concern is for thoracic outlet syndrome, which is something Dr Gasca does treat. I have already done the workup for the shoulder pain. Dr. Gasca will have his office call the patient tomorrow to get him scheduled. I left vm explaining this to the patient.  "

## 2021-10-28 NOTE — TELEPHONE ENCOUNTER
LEFT VOICEMAIL FOR PATIENT THAT LISS SAID THAT IT IS UP TO HIM SINCE SHE DOESN'T HAVE A DEFINITE ANSWER FOR HIS PAIN AND THAT WE CAN TRY A CORTISONE INJECTION TO SEE IF THAT WILL EASE HIS PAIN

## 2021-10-28 NOTE — TELEPHONE ENCOUNTER
PATIENT CALLED AND I RELAYED LISS'S MESSAGE.  HE EXPRESSED UNDERSTANDING, BUT IT WANTING TO KNOW IF HE SHOULD BE WORKING SINCE HIS SHOULDER IS SO PAINFUL.    PLEASE ADVISE

## 2021-10-28 NOTE — TELEPHONE ENCOUNTER
Honestly that is up to him since I don't have an definite answer for the pain. If he wants to try a steroid injection in the shoulder to see if it will calm the pain down we could try that.

## 2021-10-28 NOTE — TELEPHONE ENCOUNTER
Provider: LISS MENDOZA    Caller: CANDICE JONES    Relationship to Patient: SELF    Phone Number: [phone]    Reason for Call: PATIENT WAS REFERRED TO THORACIC SURGERY FOR RIGHT SHOULDER PAIN AND Paresthesias in right hand. PATIENT RECEIVED A CALL FROM DR ISRAEL OFFICE AND THEY TOLD HIM THEY DONT WORK ON SHOULDERS. PATIENT DOESN'T THINK HE NEEDS TO BE REFERRED OUT BUT NEEDS TO SEE AN ORTHOPEDIC SURGEON. PLEASE ADVISE

## 2021-10-31 PROBLEM — R20.2 PARESTHESIAS IN RIGHT HAND: Status: ACTIVE | Noted: 2021-10-31

## 2021-10-31 PROBLEM — M89.511 OSTEOLYSIS OF ACROMIAL END OF RIGHT CLAVICLE: Status: ACTIVE | Noted: 2021-10-31

## 2021-10-31 NOTE — PROGRESS NOTES
"Chief Complaint  Pain and Follow-up of the Right Shoulder    Subjective    History of Present Illness      Juan M Lynch is a 36 y.o. male who presents to [facility] for follow up on right shoulder pain and MRI results. I first saw him in 9/20/21 for pain in the shoulder, along with weakness, that had been present for over a year. He suffered a fall directly onto the right shoulder. He also experiences numbness and tingling in the right hand/fingers since the fall. I have also ordered an EMG/NCS that I do not yet have results back on. He also states that he injured the shoulder again on 10/2/21 and has been experiencing increased pain at 8/10.   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         Objective   Vital Signs:   Temp 98.5 °F (36.9 °C)   Ht 167.6 cm (66\")   Wt 68 kg (150 lb)   BMI 24.21 kg/m²     Physical Exam  Vitals signs and nursing note reviewed.   Constitutional:       Appearance: Normal appearance.   Pulmonary:      Effort: Pulmonary effort is normal.   Skin:     General: Skin is warm and dry.      Capillary Refill: Capillary refill takes less than 2 seconds.   Neurological:      General: No focal deficit present.      Mental Status: He is alert and oriented to person, place, and time. Mental status is at baseline.   Psychiatric:         Mood and Affect: Mood normal.         Behavior: Behavior normal.         Thought Content: Thought content normal.         Judgment: Judgment normal.     Ortho Exam   RIGHT shoulder  Positive for tenderness at the anterior aspect of the shoulder and at the insertion of the rotator cuff over the greater tuberosity of the humerus.   Positive for tenderness with palpation of the AC joint.  Soft tissue tenderness is noted.   Forward flexion is 0-120 degrees, abduction is 0-100 degrees, external rotation is 0-40 degrees.   Positive for decreased strength in abduction and external rotation.   Crossover adduction test is positive.    Drop " arm sign is positive for pain.  Sulcus sign is negative.   Neer test is positive on compression.  The pain level is 8.  There is no evidence of multidirectional instability.        Result Review :   Radiologic studies - see below for interpretation  Reviewed MRI report of right shoulder, performed at  [facility] on 10/13/21, summary of impression below:   · Edema in distal clavicle and acromion, early osteoarthritis with possibility of osteolysis related to chronic repetitive type injury. Radiologist comments to correlate for possible hyperparathyroidism.  · Mild biceps tendinopathy         Assessment   Assessment and Plan    Diagnoses and all orders for this visit:    1. 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 Discussion of orthopaedic goals.  · Risk, benefits, and merits of treatment alternatives reviewed with the patient. Treatment alternatives include: intra-articular steroid injection and further testing. It is important that we wait to make any treatment decisions until I get all testing back. He could try an intra-articular steroid injection in the AC joint since that could be creating some of his pain in the shoulder but it does not explain the numbness and tingling.   · Ice, heat, and/or modalities as beneficial   · Once I obtain the EMG/NCS results I will call with next step in treatment plan. At this time I would like him to have several labs performed to r/o hyperparathyroidism.   · Tramadol and prednisone will be sent to pharmacy for pain.   · Patient is encouraged to call or return for any issues or concerns.  • Patient was given instructions and counseling regarding his condition or for health maintenance advice. Please see specific  information pulled into the AVS if appropriate.     ADDENDUM from 10/27/21: His labs were normal and do not indicate an underlying cause of parathyroid issues for the x-ray and MRI findings.  Unfortunately his EMG does not explain why he is having numbness and tingling in the right hand. The results showed that it was a technically difficult exam because of pain and guarding and their findings were essentially normal.  I do feel some of his shoulder pain could still be related to the findings at the end of the clavicle, that show arthritis, but it does not explain his other symptoms.  Based on that I am concerned that he could potentially have what is called thoracic outlet syndrome, although it can be difficult to test for and we do not treat that.  Dr. Gross recommended a referral to a thoracic surgeon within [facility] that may be able to evaluate for this condition and for second opinion regarding the symptoms. I will place a referral to Dr. Gasca.     Boby Jerome PA-C   Date of Encounter: 10/15/2021   Electronically signed by Boby Jerome PA-C, 10/31/21, 4:04 PM EDT.     EMR Dragon/Transcription disclaimer:  Much of this encounter note is an electronic transcription/translation of spoken language to printed text. The electronic translation of spoken language may permit erroneous, or at times, nonsensical words or phrases to be inadvertently transcribed; Although I have reviewed the note for such errors, some may still exist.

## 2021-11-01 ENCOUNTER — CLINICAL SUPPORT (OUTPATIENT)
Dept: ORTHOPEDIC SURGERY | Facility: CLINIC | Age: 36
End: 2021-11-01

## 2021-11-01 VITALS — TEMPERATURE: 98 F | BODY MASS INDEX: 24.11 KG/M2 | HEIGHT: 66 IN | WEIGHT: 150 LBS

## 2021-11-01 DIAGNOSIS — G89.29 CHRONIC RIGHT SHOULDER PAIN: ICD-10-CM

## 2021-11-01 DIAGNOSIS — M25.511 CHRONIC RIGHT SHOULDER PAIN: ICD-10-CM

## 2021-11-01 DIAGNOSIS — M89.511 OSTEOLYSIS OF ACROMIAL END OF RIGHT CLAVICLE: Primary | ICD-10-CM

## 2021-11-01 PROCEDURE — 20610 DRAIN/INJ JOINT/BURSA W/O US: CPT | Performed by: PHYSICIAN ASSISTANT

## 2021-11-01 RX ORDER — METHYLPREDNISOLONE ACETATE 80 MG/ML
160 INJECTION, SUSPENSION INTRA-ARTICULAR; INTRALESIONAL; INTRAMUSCULAR; SOFT TISSUE
Status: COMPLETED | OUTPATIENT
Start: 2021-11-01 | End: 2021-11-01

## 2021-11-01 RX ADMIN — METHYLPREDNISOLONE ACETATE 160 MG: 80 INJECTION, SUSPENSION INTRA-ARTICULAR; INTRALESIONAL; INTRAMUSCULAR; SOFT TISSUE at 10:19

## 2021-11-01 NOTE — PROGRESS NOTES
"Chief Complaint  Follow-up and Pain of the Right Shoulder and Injections    Subjective    History of Present Illness      Juan M Lynch is a 36 y.o. male who presents to [facility] for is here today for injection therapy. He is receiving first time  RIGHT shoulder injections of Depo-Medrol.  He is reporting continued pain in the shoulder around the AC joint and anterior aspect of the shoulder along with crepitus.  I have advised that is likely the osteolysis that is causing his shoulder pain although explained again that this is not explain the numbness and tingling he is experiencing in the hand and arm, which is why I am referring to Dr. Charles.  Treatment options have been discussed and he understands and consents.       Objective   Vital Signs:   Temp 98 °F (36.7 °C)   Ht 167.6 cm (65.98\")   Wt 68 kg (150 lb)   BMI 24.22 kg/m²     Physical Exam  36 y.o. male is awake, alert, oriented, in no acute distress and well developed, well nourished.  Ortho Exam   RIGHT shoulder  Positive for tenderness at the anterior aspect of the shoulder and at the insertion of the rotator cuff over the greater tuberosity of the humerus.   Positive for tenderness with palpation of the AC joint.  Soft tissue tenderness is noted.   Forward flexion is 0-120 degrees, abduction is 0-100 degrees, external rotation is 0-40 degrees.   Positive for decreased strength in abduction and external rotation.   Crossover adduction test is positive.    Drop arm sign is positive for pain.  Sulcus sign is negative.   Neer test is positive on compression.  The pain level is 7.  There is no evidence of multidirectional instability.        Result Review :         PROCEDURE  Large Joint Arthrocentesis: R glenohumeral  Date/Time: 11/1/2021 10:19 AM  Consent given by: patient  Site marked: site marked  Timeout: Immediately prior to procedure a time out was called to verify the correct patient, procedure, equipment, support " staff and site/side marked as required   Supporting Documentation  Indications: pain and joint swelling   Procedure Details  Location: shoulder - R glenohumeral  Preparation: Patient was prepped and draped in the usual sterile fashion  Needle size: 25 G  Approach: anterolateral  Medications administered: 160 mg methylPREDNISolone acetate 80 MG/ML; 2 mL lidocaine (cardiac)  Patient tolerance: patient tolerated the procedure well with no immediate complications           Injection site was identified by physical examination and cleaned with Betadine and alcohol swabs. Prior to needle insertion, ethyl chloride spray was used for surface anesthesia. Sterile technique was used.       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 Discussed indication, risks, benefits, and alternatives. Verbal consent was given to proceed with the procedure.   • Injection was performed from anteromedial approach.  Patient tolerated the procedure well and no complications were encountered.  • Discussion of orthopedic goals and activities and patient/guardian expressed understanding.  • Ice, heat, rest, compression and elevation of extremity as beneficial  • nsaids and/or tylenol as beneficial  • Instructed to refrain from heavy activity/rest the extremity for the next 24-48 hours  • Discussion regarding possibility of cortisol flare and what to expect if this occurs  • Watch for signs and symptoms of infection  • Call if adverse effect from injection therapy  • Follow up as needed-he will call to let me know how the shoulder does. Depending on symptoms we may have to  further discuss shoulder arthroscopy with barbie.  • Patient was given instructions and counseling regarding his condition or for health maintenance advice. Please see specific information pulled into the AVS if appropriate.     Boby Jerome PA-C   Date of Encounter: 11/1/2021   Electronically signed by Boby Jerome PA-C, 11/01/21, 10:00 AM EDT.     EMR Dragon/Transcription disclaimer:  Much of this encounter note is an electronic transcription/translation of spoken language to printed text. The electronic translation of spoken language may permit erroneous, or at times, nonsensical words or phrases to be inadvertently transcribed; Although I have reviewed the note for such errors, some may still exist.

## 2021-11-09 ENCOUNTER — TELEPHONE (OUTPATIENT)
Dept: ORTHOPEDIC SURGERY | Facility: CLINIC | Age: 36
End: 2021-11-09

## 2021-11-09 NOTE — TELEPHONE ENCOUNTER
DELETE AFTER REVIEWING: Telephone     Caller: Juan M Lynch    Relationship to patient: Self    Best call back number:[phone]    Patient is needing: RETURNING LISS'S CALL  RE: HIS TREATMENT PLAN

## 2021-11-09 NOTE — TELEPHONE ENCOUNTER
Provider: LISS MENDOZA  Caller: CANDICE JONES  Relationship to Patient: SELF  Phone Number: [phone]  Reason for Call: FOLLOWING  RIGHT SHOULDER INJECTION, PATIENT WAS WANTING TO SPEAK WITH LISS MENDOZA ABOUT WHAT HIS NEXT STEPS ARE. PLEASE ADVISE.

## 2021-11-09 NOTE — TELEPHONE ENCOUNTER
How did the injection do?  I would like to wait to see what the thoracic surgeon says before making any decisions.

## 2021-11-11 ENCOUNTER — TELEPHONE (OUTPATIENT)
Dept: SURGERY | Facility: CLINIC | Age: 36
End: 2021-11-11

## 2021-12-03 ENCOUNTER — TELEPHONE (OUTPATIENT)
Dept: SURGERY | Facility: CLINIC | Age: 36
End: 2021-12-03

## 2021-12-03 NOTE — TELEPHONE ENCOUNTER
Patient was left vm with details of new patient appt with linker on 12/7/2021 at 1015 am. detailss to call back and confirm.  12/3/2021 at 1032 am.

## 2021-12-07 ENCOUNTER — OFFICE VISIT (OUTPATIENT)
Dept: SURGERY | Facility: CLINIC | Age: 36
End: 2021-12-07

## 2021-12-07 VITALS
BODY MASS INDEX: 24.11 KG/M2 | SYSTOLIC BLOOD PRESSURE: 124 MMHG | HEIGHT: 66 IN | DIASTOLIC BLOOD PRESSURE: 84 MMHG | OXYGEN SATURATION: 99 % | HEART RATE: 65 BPM | WEIGHT: 150 LBS

## 2021-12-07 DIAGNOSIS — G54.0 TOS (THORACIC OUTLET SYNDROME): Primary | ICD-10-CM

## 2021-12-07 PROCEDURE — 99202 OFFICE O/P NEW SF 15 MIN: CPT | Performed by: THORACIC SURGERY (CARDIOTHORACIC VASCULAR SURGERY)

## 2021-12-07 NOTE — PROGRESS NOTES
Chief Complaint  Neck and right shoulder pain with numbness and paresthesias right arm and hand    Subjective          Juan M Lynch presents to [facility]  History of Present Illness     Mr. Lynch is a 36-year-old  male seen in our office today December 7, 2021 for evaluation and treatment of neck and right shoulder pain with numbness and paresthesias in the right arm and hand.  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  He is very healthy and fit.  In February of this year he fell on the ice striking his right shoulder.  This was a direct impact on his shoulder.  He continued doing his normal activities including work.  Over the ensuing months difficulty with his shoulder increased.  He was seen by orthopedics and in June had an MRI of the shoulder.  There was a separation of the clavicle from the shoulder with some fluid in the shoulder joint consistent with early arthritis.  He continued working and in October while doing some heavy work he felt a tear in the right shoulder.  Since then he has had a burning sensation in the shoulder radiating down into the arm and hand.  This is been associated with numbness and paresthesias.  When he tries to do any activity overhead or by reaching out with his right hand he feels a popping grinding sensation in his shoulder.  Depending on his position the arm will completely fall asleep.  He has had increasing neck discomfort with this.  His work requires him to climb a ladder and he is having quite a bit of difficulty doing that.  He had EMG and nerve conduction study but the study had to be cut short because was causing so much pain.  Sometimes the pain was so bad that he will have to drink himself to sleep.    He is a non-smoker.  He has no history of cancer.  He has no history of diabetes.    Objective   Vital Signs:   /84 (BP Location: Right arm, Patient Position: Sitting, Cuff Size: Adult)    "Pulse 65   Ht 167.6 cm (66\")   Wt 68 kg (150 lb)   SpO2 99%   BMI 24.21 kg/m²     Physical Exam  Constitutional:       Appearance: Normal appearance. He is well-developed.   HENT:      Head: Normocephalic.   Eyes:      General: Lids are normal.      Conjunctiva/sclera: Conjunctivae normal.      Pupils: Pupils are equal, round, and reactive to light.   Neck:      Thyroid: No thyroid mass or thyromegaly.      Vascular: No carotid bruit, hepatojugular reflux or JVD.      Trachea: Trachea normal.      Comments: Full range of motion without pain.  No tenderness in the right or left supraclavicular fossa.  No masses and no adenopathy.  Cardiovascular:      Rate and Rhythm: Normal rate and regular rhythm.  No extrasystoles are present.     Chest Wall: PMI is not displaced.      Pulses: Normal pulses.      Heart sounds: Normal heart sounds, S1 normal and S2 normal.      Comments: Equivocal Adson's maneuver on the right.  Negative Adson's maneuver on the left.  Pulmonary:      Effort: Pulmonary effort is normal.      Breath sounds: Normal breath sounds.   Abdominal:      General: Bowel sounds are normal.      Palpations: Abdomen is soft. There is no mass.      Tenderness: There is no abdominal tenderness.      Hernia: No hernia is present.   Musculoskeletal:         General: Normal range of motion.      Cervical back: Normal range of motion and neck supple.      Comments: Normal strength in all muscle groups tested in both upper extremities.   is normal bilaterally.  Range of motion in the right shoulder is limited by pain.  Normal range of motion the left shoulder.  Very tender in the right deltopectoral groove.  No tenderness in the left deltopectoral groove.   Skin:     General: Skin is warm and dry.   Neurological:      Mental Status: He is alert and oriented to person, place, and time.      Cranial Nerves: No cranial nerve deficit.      Sensory: No sensory deficit.      Deep Tendon Reflexes: Reflexes are normal " and symmetric.      Comments: Touch and fine motor function are intact bilaterally   Psychiatric:         Speech: Speech normal.         Behavior: Behavior normal.         Thought Content: Thought content normal.         Judgment: Judgment normal.        Result Review :   The following data was reviewed by: Manan Gasca III, MD on 12/07/2021:    MRI of the right shoulder performed October 13, 2021 was reviewed.  There is edema in the distal clavicle and adjacent acromion.  Some changes of early arthritis.  Mild biceps tendinopathy.      EMG and nerve conduction studies performed October 14, 2021 were also reviewed.  Essentially normal study.         Assessment and Plan    Diagnoses and all orders for this visit:    1. TOS (thoracic outlet syndrome) (Primary)  -     MRI brachial plexus upper extremity right w wo contrast; Future  -     Ambulatory Referral to Pain Management  -     Ambulatory Referral to Physical Therapy Evaluate and treat        Difficult to tell if this patient has thoracic outlet syndrome or not.  Symptoms and complaints are very consistent with TOS.  We will get a MRI of the right brachial plexus with stress positions to help evaluate further.  Will schedule right interscalene blocks to see if we can get some relief of his symptoms.  We will start physical therapy targeted at right first rib and pectoralis minor.  We will do the physical therapy for 6 weeks.  Patient will return in 6 weeks for further evaluation.    I spent 28 minutes caring for Juan M on this date of service. This time includes time spent by me in the following activities:preparing for the visit, reviewing tests, obtaining and/or reviewing a separately obtained history, performing a medically appropriate examination and/or evaluation , counseling and educating the patient/family/caregiver, ordering medications, tests, or procedures, referring and communicating with other health care professionals , documenting information  in the medical record, independently interpreting results and communicating that information with the patient/family/caregiver and care coordination  Follow Up   Return in about 6 weeks (around 1/18/2022) for Recheck.  Patient was given instructions and counseling regarding his condition or for health maintenance advice. Please see specific information pulled into the AVS if appropriate.

## 2021-12-23 ENCOUNTER — APPOINTMENT (OUTPATIENT)
Dept: MRI IMAGING | Facility: HOSPITAL | Age: 36
End: 2021-12-23

## 2022-01-12 ENCOUNTER — HOSPITAL ENCOUNTER (OUTPATIENT)
Dept: MRI IMAGING | Facility: HOSPITAL | Age: 37
Discharge: HOME | End: 2022-01-12
Admitting: THORACIC SURGERY (CARDIOTHORACIC VASCULAR SURGERY)

## 2022-01-12 DIAGNOSIS — G54.0 TOS (THORACIC OUTLET SYNDROME): ICD-10-CM

## 2022-01-12 PROCEDURE — 73220 MRI UPPR EXTREMITY W/O&W/DYE: CPT

## 2022-01-12 PROCEDURE — A9577 INJ MULTIHANCE: HCPCS | Performed by: THORACIC SURGERY (CARDIOTHORACIC VASCULAR SURGERY)

## 2022-01-12 PROCEDURE — 0 GADOBENATE DIMEGLUMINE 529 MG/ML SOLUTION: Performed by: THORACIC SURGERY (CARDIOTHORACIC VASCULAR SURGERY)

## 2022-01-12 RX ADMIN — GADOBENATE DIMEGLUMINE 13 ML: 529 INJECTION, SOLUTION INTRAVENOUS at 10:08

## 2022-01-25 ENCOUNTER — TREATMENT (OUTPATIENT)
Dept: PHYSICAL THERAPY | Facility: CLINIC | Age: 37
End: 2022-01-25

## 2022-01-25 DIAGNOSIS — G54.0 THORACIC OUTLET SYNDROME: Primary | ICD-10-CM

## 2022-01-25 DIAGNOSIS — M25.511 ARTHRALGIA OF RIGHT ACROMIOCLAVICULAR JOINT: ICD-10-CM

## 2022-01-25 DIAGNOSIS — M89.511 OSTEOLYSIS OF ACROMIAL END OF RIGHT CLAVICLE: ICD-10-CM

## 2022-01-25 DIAGNOSIS — M25.511 RIGHT SHOULDER PAIN, UNSPECIFIED CHRONICITY: ICD-10-CM

## 2022-01-25 DIAGNOSIS — R29.898 WEAKNESS OF SHOULDER: ICD-10-CM

## 2022-01-25 PROCEDURE — 97162 PT EVAL MOD COMPLEX 30 MIN: CPT | Performed by: PHYSICAL THERAPIST

## 2022-01-25 NOTE — PROGRESS NOTES
The patient has a pain history of the following:  Right Thoracic outlet syndrome     Previous interventions that the patient has received include:   Shoulder injections - helped a few weeks    Pain medications include:  Ibuprofen    Previously: Tramadol (constipation), Prednisone (not much benefit)    Other conservative modalities which the patient reports using include:  Physical Therapy: no  Chiropractor: no  Massage Therapy: no  TENS: no  Neck or back surgery: no  Past pain management: no    Past Significant Surgical History:  None     HPI:       CHIEF COMPLAINT: Shoulder Pain    Juan M Lynch is a 36 y.o. male referred here by Manan Gasca MD. Juan M Lynch presents to the office for evaluation and treatment of Shoulder Pain      Onset:  October 2021  Inciting Event:  Felt like he had shoulder injury at work while pulling  Location:  Right upper extremity  Pain: Pain described as numbness, dull and sharp. Located in the right upper extremity from the shoulder and does radiate stiffness into the neck and numbness in the entire arm/hand.  Severity:  Pain rated as a 6 /10.  Symptoms have been constant.  Exacerbation:  Sleeping with arms up under his pillow (his usual sleeping position).  Overhead work.   Alleviation:  Shoulder injections helped the most.  Ambulates: Without assistive device        PEG Assessment   What number best describes your pain on average in the past week?6  What number best describes how, during the past week, pain has interfered with your enjoyment of life?6  What number best describes how, during the past week, pain has interfered with your general activity?  6        Current Outpatient Medications:   •  ibuprofen (ADVIL,MOTRIN) 200 MG tablet, Take 200 mg by mouth Every 6 (Six) Hours As Needed for Mild Pain ., Disp: , Rfl:     The following portions of the patient's history were reviewed and updated as appropriate: allergies, current medications, past family history, past medical  history, past social history, past surgical history and problem list.      REVIEW OF PERTINENT MEDICAL DATA    1/12/22 PROCEDURE:  MRI BRACHIAL PLEXUS UPPER EXTREMITY RIGHT W WO CONTRAST     COMPARISON: [facility], , MRI SHOULDER RIGHT WO CONTRAST, 10/13/2021, 8:57.     INDICATIONS:  NECK PAIN, RIGHT SHOULDER PAIN WITH NUMBNESS IN RIGHT HAND     TECHNIQUE:    Multiplanar multisequence images of the right brachial plexus with and without   intravenous gadolinium.     FINDINGS:  There is a 1.9 cm x 1.3 cm T1/T2 hyperintense mass in the posterior right thyroid lobe.  Marrow   signal intensity is normal.  No evidence of marrow edema or fracture.  No evidence of significant   focal disc protrusion or central canal stenosis in the cervical spine.  No evidence of abnormal   intramedullary signal or enhancement in the cervical cord.  The right brachial plexus has a normal   appearance.  No evidence of mass or abnormal enhancement.  No evidence of thoracic outlet   obstruction.     IMPRESSION:      1. 1.9 cm x 1.3 cm right thyroid mass.  Recommend a thyroid ultrasound examination for further   evaluation.     2. Normal MRI of the right brachial plexus.       MAGDA FINN MD         Electronically Signed and Approved By: MAGDA FNIN MD on 1/12/2022 at 13:19         7/26/21 Creatinine 0.94, Platelets 219 (10*3)    Review of Systems   Constitutional: Negative for fatigue.   HENT: Positive for congestion.    Eyes: Negative for visual disturbance.   Respiratory: Negative for shortness of breath.    Cardiovascular: Negative for chest pain.   Gastrointestinal: Negative for constipation and diarrhea.   Genitourinary: Negative for difficulty urinating.   Musculoskeletal: Positive for arthralgias (right shoulder).   Neurological: Positive for weakness (right arm) and numbness (right arm).   Psychiatric/Behavioral: Positive for sleep disturbance. Negative for suicidal ideas. The patient is nervous/anxious.   "    I have reviewed and confirmed the accuracy of the ROS as documented by the MA/LPN/RN Felicia Gonzalez MD      Vitals:    01/27/22 0843   BP: 148/90   Pulse: 67   Resp: 18   Temp: 97.3 °F (36.3 °C)   SpO2: 98%   Weight: 69.3 kg (152 lb 12.8 oz)   Height: 167.6 cm (66\")   PainSc:   6   PainLoc: Shoulder         Objective   Physical Exam  Vitals reviewed.   Constitutional:       General: He is not in acute distress.  Pulmonary:      Effort: Pulmonary effort is normal. No respiratory distress.   Musculoskeletal:      Comments: Positive right overhead stress test.  Pulse becomes weaker with right Adson's test.    Skin:     General: Skin is warm and dry.   Neurological:      General: No focal deficit present.      Mental Status: He is alert.      Sensory: No sensory deficit.      Deep Tendon Reflexes:      Reflex Scores:       Bicep reflexes are 3+ on the right side and 3+ on the left side.       Brachioradialis reflexes are 3+ on the right side and 3+ on the left side.     Comments: Negative Sepulveda's bilaterally   Psychiatric:         Mood and Affect: Mood normal.         Thought Content: Thought content normal.         Assessment/Plan   Diagnoses and all orders for this visit:    1. Chronic right shoulder pain (Primary)    2. Paresthesias in right hand    3. Thyroid mass          - Pertinent labs reviewed.   - Pertinent imaging reviewed.   - Explained results of MRI showing thyroid mass.  Explained he will need an ultrasound of his thyroid for further evaluation.  He understands.  I will send a message to his PCP to determine if she will order the test.  If not, I will order the exam for him.   - Will need to schedule right interscalene nerve block.  Risks discussed including but not limited to bleeding, bruising, infection, damage to surrounding structures, headache, and rare things such as being paralyzed, seizure, stroke, heart attack and death.  The risk of steroid medications include but are not limited to " immunosuppression, which can increase the risk of navjot an infectious disease as well as decrease the immune response to a vaccine.   He will call once his thyroid has been worked up and we will move forward with the injections.   - Juan M Lynch reports a pain score of 6.  Given his pain assessment as noted, treatment options were discussed and the following options were decided upon as a follow-up plan to address the patient's pain: steroid injections.  - Patient screened positive for depression based on a PHQ-9 score of 14 on 1/27/2022. Follow-up recommendations include: PCP managing depression.    --- Follow-up after thyroid work-up.         While examining this patient, I wore protective equipment including a mask, eye shield and gloves.  I washed my hands before and after this patient encounter.  The patient wore a mask throughout the visit as well.     Felicia Gonzalez MD  Pain Management

## 2022-01-25 NOTE — PROGRESS NOTES
"  Physical Therapy Initial Evaluation and Plan of Care      Patient: Juan M Lynch   : 1985  Diagnosis/ICD-10 Code:  Thoracic outlet syndrome [G54.0]  Referring practitioner: Manan Gasca III, MD  Date of Initial Visit: 2022  Today's Date: 2022  Patient seen for 1 sessions      Next physician visit: 22       Visit Diagnoses:    ICD-10-CM ICD-9-CM   1. 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 Right shoulder pain, unspecified chronicity  M25.511 719.41   4. Arthralgia of right acromioclavicular joint  M25.511 719.41   5. Weakness of shoulder  R29.898 719.61       Subjective Questionnaire: QuickDASH: 77.27      Subjective Evaluation    History of Present Illness  Mechanism of injury: Pt presents to [facility] to be evaluated for R shoulder pain, diagnosed as TOS..    Pt relates fell in snow  2021, sustaining injury to R shoulder, had difficulty moving shoulder for a couple weeks.  States in Oct 2021, was using a pry bar at work and felt a pop in the R ant shoulder, and felt tingling in his hand and arm.     Since then pt has had xrays, MRI of shoulder and brachial plexus, tried multiple medications. Pt relates was injected, but is unsure if it was subacromially or to AC joint.  Pt has been placed on light duty at work.    Pt relates pain in ant/ superior R shoulder, indicating pain with elevation of the arm > 80-90 degrees, limited rotation tolerance. Pt relates decreased pain with the arm resting on arm rest of a chair or otherwise supported.      Pt describes painful \"grinding and popping\" in the ant superior shoulder with motion. Relates difficulty sleeping due to pain .  States if he awakes with the arm overhead, has tingling in fingers and hand.           Patient Occupation: maintentaince at factory    Precautions and Work Restrictions: 10# lifting restriction, no climbing, " limited use of R UEPain  Current pain ratin  At worst pain rating: 10  Location: R shoulder  Quality: burning (crepitus , painful)  Relieving factors: relaxation, rest and support  Aggravating factors: repetitive movement, outstretched reach and movement  Progression: worsening    Social Support  Lives with: alone    Hand dominance: right    Treatments  Previous treatment: injection treatment  Patient Goals  Patient goals for therapy: decreased pain, increased motion, increased strength, independence with ADLs/IADLs, return to sport/leisure activities and return to work  Patient goal: sleep painfree           Objective           General Comments     Shoulder Comments   TTP over R AC joint, superior acromion, distal clavical, ant humeral head/ ant capsule  Palpable crepitus in R AC joint with AROM  Decreased R scapular musculature, winging R scapula  Scapula in protracted position,   Non tender to distal UE    ROM:   L shoulder/ UE WFL  R shoulder:   Flexion 105 with guarding, compensation and c/o pain   Scaption 70 with guarding, compensation and c/o pain  Abduction 70 guarding, compensation and c/o pain  IR - able to reach lateral hip, guarding, compensation and c/o pain  ER - able to reach ear with cervical SB and shrugging, guarding, compensation and c/o pain    CERVICAL:   Flex/ext/ B rotation/ SB to R  - WFL, no c/o  SB L at end range, has mild awareness of superior shoulder soreness    MMT: R shoulder, poor scapular stabilization with testing:   Isometric testing in neutral: arm at side, elbow flexed at 90:   Flexion 4- with c/o pain in superior shoulder  Extension 4- with mild c/o  Abduction 4- with c/o pain in superior shoulder  Adduction 4- mild c/o  IR 4 with mild c/o  ER 4- with c/o pain in superior shoulder     Testing with arm in 30 degrees flexion: 3- with c/o pain in superior shoulder, poor scapular stabilization     strength : able to squeeze fingers, trace less on R vs L    Pt unable to  attain shoulder positions necessary to test for TOS symptoms.          Assessment & Plan     Assessment  Impairments: abnormal or restricted ROM, activity intolerance, impaired physical strength, lacks appropriate home exercise program and pain with function  Functional Limitations: carrying objects, lifting, sleeping, pulling, pushing, uncomfortable because of pain, moving in bed, reaching behind back and reaching overhead  Assessment details: Pt presents with decreased functional capacity due to c/o R AC, superior shoulder pain.    Pt relates/ demonstrates:   -TTP over R AC joint, distal clavical  -pain to elevate arm into flexion / abduction/ IR/ ER,  -shoulder girdle weakness,   -poor AC stability,  -poor scapular stabilization,   -scapular dysfunction,   -scapular winging ,   -poor scapular stabilization with elevation of the UE,   -limitation with lifting , pushing, pulling, carrying, due to shoulder pain/ weakness,  -limitation performing hobbies/work/ community activities, due to shoulder pain,   -interference with sleep,   -compensation necessary to perform ADLs,   -a lack of  appropriate progressive HEP.     Pt would benefit from skilled physical  therapy intervention to address the above mentioned deficits.     Time was spent discussing anatomy, physiology, pathology, and  therapy treatment plan. Discussed posture and motions to avoid.      Pt related understanding of pathology.       Pt appears to have AC dysfunction, instability, tenderness to distal clavicle possibly from osteolysis.   I was not able to screen for TOS due to limited shoulder AROM/PROM.         Barriers to therapy: none noted at this time  Prognosis: good    Goals  Plan Goals: STG: 3 weeks  Pt will demonstrate/ report:     -decreased c/o pain R shoulder 2-3/10, 50-75% of the time.    -increased shoulder AROM to 115 degrees flexion, min/no c/o pain.     -increased shoulder AROM to 80 degrees scaption, min/no c/o pain.    -proper scapular  positioning/ stabilization     -resumption of dressing / bathing with only moderate compensation due to  shoulder limitations.     -sleeping 75%, or more , of the night without waking due to shoulder pain.     -I in HEP each visit.     -improve score on QUICK DASH.       LT weeks  Pt will report/ demonstrate:     -improved functional use of R shoulder to perform ADLs, hobbies , work duties with min/ no limitation due to shoulder pain.    - R shoulder strength 4-4+/5 to perform home tasks and community activities as needed, with min/no limitation due to shoulder weakness.     -increased B scapular stabilization with functional use of   R UE.    -resumption of hobbies with min limitations due to shoulder pain/weakness.      -improve score on QUICK DASH     -be I in final HEP and progression parameters to perform after formal physical therapy has been discontinued    Plan  Therapy options: will be seen for skilled therapy services  Planned modality interventions: ultrasound, dry needling, high voltage pulsed current (pain management), cryotherapy, thermotherapy (hydrocollator packs) and electrical stimulation/Russian stimulation  Other planned modality interventions: any  other modality as indicated to benefit the pt  Planned therapy interventions: flexibility, functional ROM exercises, home exercise program, manual therapy, abdominal trunk stabilization, postural training, soft tissue mobilization, strengthening, stretching, therapeutic activities and neuromuscular re-education  Other planned therapy interventions: any other intervention deemed necessary to benefit the pt  Frequency: 2x week  Duration in visits: 12  Duration in weeks: 6  Treatment plan discussed with: patient and PTA  Plan details: Pt will see referring thoracic surgeon next week.   Will continue as requested/ indicated.    Continue therapy involving education, scapular stabilization, postural education, strengthening, shoulder girdle and trunk  stabilization training, modalities as indicated, manual therapy techniques as indicated, progressive HEP instruction.            Planned interventions:  Any other modality and/or intervention deemed necessary to benefit the patient.        History # of Personal Factors and/or Comorbidities: LOW (0)  Examination of Body System(s): # of elements: HIGH (4+)  Clinical Presentation: STABLE   Clinical Decision Making: MODERATE      Timed:  Manual Therapy:         mins  23890;  Therapeutic Exercise:         mins  93366;     Neuromuscular Herbie:        mins  80483;    Therapeutic Activity:          mins  17665;     Gait Training:           mins  68658;     Ultrasound:          mins  03084;    Canalith Repos           ___  mins  08338      Untimed:  Electrical Stimulation:         mins  46995 ( );  Mechanical Traction:         mins  79706;     Low Complexity Evaluation:                           mins  16305;  Moderate Complexity Evaluation:              35    mins  85227;   High Complexity Evaluation:                          mins  48282       Timed Treatment:      mins   Total Treatment:     35   mins      DATE TREATMENT INITIATED: 1/25/2022              PT SIGNATURE: Cheryl Kinney PT   [place] License: [id number]  This document has been electronically signed by Cheryl Kinney PT on January 25, 2022 12:57 EST        Initial Certification    Certification Period: 1/25/2022 thru 4/24/2022  I certify that the therapy services are furnished while this patient is under my care.  The services outlined above are required by this patient, and will be reviewed every 90 days.     PHYSICIAN: Manan Gasca III, MD   NPI: [id number]      PHYSICIAN PRINT NAME: ______________________________________________      PHYSICIAN SIGNATURE: ______________________________________________         DATE:________________________________

## 2022-01-27 ENCOUNTER — OFFICE VISIT (OUTPATIENT)
Dept: PAIN MEDICINE | Facility: CLINIC | Age: 37
End: 2022-01-27

## 2022-01-27 VITALS
HEART RATE: 67 BPM | RESPIRATION RATE: 18 BRPM | BODY MASS INDEX: 24.56 KG/M2 | DIASTOLIC BLOOD PRESSURE: 90 MMHG | WEIGHT: 152.8 LBS | HEIGHT: 66 IN | SYSTOLIC BLOOD PRESSURE: 148 MMHG | OXYGEN SATURATION: 98 % | TEMPERATURE: 97.3 F

## 2022-01-27 DIAGNOSIS — G89.29 CHRONIC RIGHT SHOULDER PAIN: Primary | ICD-10-CM

## 2022-01-27 DIAGNOSIS — M25.511 CHRONIC RIGHT SHOULDER PAIN: Primary | ICD-10-CM

## 2022-01-27 DIAGNOSIS — R20.2 PARESTHESIAS IN RIGHT HAND: ICD-10-CM

## 2022-01-27 DIAGNOSIS — E07.9 THYROID MASS: ICD-10-CM

## 2022-01-27 DIAGNOSIS — E07.9 THYROID MASS: Primary | ICD-10-CM

## 2022-01-27 PROCEDURE — 99204 OFFICE O/P NEW MOD 45 MIN: CPT | Performed by: ANESTHESIOLOGY

## 2022-01-27 NOTE — PROGRESS NOTES
This provider has advised thyroid ultrasound which I have ordered along with thyroid panel with tsh.

## 2022-01-27 NOTE — PATIENT INSTRUCTIONS
What to expect if we're setting up an injection/procedure    - I have placed the order today, we'll start speaking to your insurance for authorization (this can sometimes take a few weeks).   - You should be scheduled for your procedure before you leave the office.  If you were not, please call our office to schedule.   - A COVID test may be required for your procedure.  We will let you know if this needs to be scheduled.  - LIGHT Intravenous (IV) sedation is offered for some procedures: you will NOT be put to sleep.  If you plan to have sedation, do not eat or drink anything on the day of your injection.   - Most procedures require having someone drive you.  Please make sure you arrange a  unless told otherwise.   - If you take a blood thinner and you were not instructed whether to continue or hold it, please contact us with any questions.      Neurogenic Thoracic Outlet Syndrome    Neurogenic thoracic outlet syndrome (TOS) is a condition that happens when the nerves that supply the arms and hands (brachial plexus nerves) are squeezed or compressed. To reach your arm, these nerves have to pass through a tight space under the collarbone (clavicle) and above the top rib (thoracic outlet). This is the most common type of TOS.  Depending on which structures are affected, you may have symptoms on one or both sides of your body.  What are the causes?  This condition may be caused by swelling or scarring in your neck muscles. The swelling and scarring result in the narrowing of your thoracic outlet. This leads to nerve compression. It can happen as a result of:  · Neck injuries from a motor vehicle collision (whiplash).  · Falls.  · Repetitive stress on your neck from working with your arms, especially if the arms are elevated. This stress could be from typing on a keyboard all day or working on an assembly line.  · Other forms of neck, shoulder, or arm injury.  What increases the risk?  The following factors may  make you more likely to develop this condition:  · A neck injury.  · Repetitive stress on your neck.  · Being female.  · Being overweight.  · Having poor posture.  · Having an extra rib (cervical rib).  · Loosening of joints during pregnancy.  What are the signs or symptoms?  Symptoms of this condition include:  · Pain in your arm when at rest.  · Feeling numbness or tingling in your hand.  · Decreased strength in your arm.  · Muscle loss in the hands (rare).  All signs and symptoms of TOS may get worse when you hold your arms over your head.  How is this diagnosed?  This condition is diagnosed with:  · A physical exam. Your health care provider may ask you to hold your arms over your head and in other positions to check whether your symptoms get worse.  · Tests and imaging studies to confirm the diagnosis and to find out what is causing TOS. These may include:  ? X-rays to look for an extra rib at the base of your neck (cervical rib) or other abnormality of the ribs.  ? Ultrasound.  ? CT scan.  ? MRI.  ? Venogram or angiogram. In these tests, X-rays are done after a dye is injected into an artery or vein.  ? Pulse volume recording. This measures the pulses in your wrist.  ? Nerve conduction test. This measures the speed of nerve impulses in your arm.  How is this treated?  This condition may be treated with:  · Physical therapy to learn stretching exercises and good posture.  · Occupational therapy to improve how your workplace and home are set up.  · Medicine, including pain medicine, muscle relaxants, and anti-inflammatory medicine.  · Surgery to remove scarred neck muscles or the first rib. This is rare.  Follow these instructions at home:  Activity  · Do stretches and exercises as told by your health care provider or physical therapist.  · Maintain good posture.  · Do not lift anything that is heavier than 10 lb (4.5 kg), or the limit that you are told, until your health care provider says that it is  safe.  ? Do not carry heavy bags over your shoulder or repetitively lift heavy objects over your head.  · Take frequent breaks to stretch and rest your arms if you work at a keyboard or do other repetitive work with your hands and arms.  General instructions  · Maintain a healthy weight. Lose weight as told by your health care provider.  · Take over-the-counter and prescription medicines only as told by your health care provider.  · Keep all follow-up visits as told by your health care provider. This is important.  Contact a health care provider if:  · You have pain, cramps, numbness, or tingling in your arm or hand.  · Your arm or hand often feels tired.  · Your arm turns into a darker and different skin color than usual.  · Your hand feels cold.  · You have frequent headaches or neck pain.  · You have muscle loss in your hand.  Get help right away if:  · You lose feeling in your arm or hand.  · You cannot move your fingers.  · Your fingers turn into a dark color.  Summary  · Neurogenic TOS happens when the nerves that supply your arm and hand are compressed.  · Neurogenic TOS may be caused by swelling or scarring in your neck muscles. These result in the narrowing of your thoracic outlet, leading to nerve compression.  · Signs and symptoms of this condition include pain in your arm when at rest, numbness or tingling in your hand, decreased strength in your arm, and muscle loss in your hands.  · This condition may be treated with physical therapy, occupational therapy, medicine, or surgery.  This information is not intended to replace advice given to you by your health care provider. Make sure you discuss any questions you have with your health care provider.  Document Revised: 06/29/2020 Document Reviewed: 06/29/2020  [employer]vier Patient Education © 2021 [employer]

## 2022-02-01 ENCOUNTER — OFFICE VISIT (OUTPATIENT)
Dept: SURGERY | Facility: CLINIC | Age: 37
End: 2022-02-01

## 2022-02-01 VITALS
WEIGHT: 152 LBS | OXYGEN SATURATION: 100 % | HEART RATE: 75 BPM | SYSTOLIC BLOOD PRESSURE: 138 MMHG | BODY MASS INDEX: 24.43 KG/M2 | DIASTOLIC BLOOD PRESSURE: 82 MMHG | HEIGHT: 66 IN

## 2022-02-01 DIAGNOSIS — E04.1 THYROID NODULE: ICD-10-CM

## 2022-02-01 DIAGNOSIS — G54.0 TOS (THORACIC OUTLET SYNDROME): Primary | ICD-10-CM

## 2022-02-01 PROCEDURE — 99213 OFFICE O/P EST LOW 20 MIN: CPT | Performed by: THORACIC SURGERY (CARDIOTHORACIC VASCULAR SURGERY)

## 2022-02-01 NOTE — PROGRESS NOTES
"Chief Complaint  Neck and arm pain    Subjective          Juan M Lynch presents to [facility] THORACIC SURGERY  History of Present Illness     Mr. Lynch was seen in the office today January 1, 2022 for further follow-up of neck and right shoulder pain radiating into the right arm and hand associated with numbness and paresthesias.  Symptoms have gotten much worse since his last visit.  He has been evaluated by physical therapy but has not started physical therapy as yet.  He was seen by pain management but they felt that they could not proceed on with interscalene block until the nodule in his right thyroid was evaluated.  Imaging of his right brachial plexus which should be done on January 12 showed lesion in the right thyroid.  Pain and weakness in the right arm has significantly increased.  This is making it much more difficult for him to do his work at the [employer].  He does a lot of climbing pushing and pulling and lifting in his work as a .  Whenever he tries to lift his arm above the level of the shoulder there is a lot of popping and grinding in his right shoulder.  This is been evaluated by orthopedics and they actually sent him here for us to evaluate him for thoracic outlet syndrome.    Objective   Vital Signs:   /82 (BP Location: Right arm, Patient Position: Sitting, Cuff Size: Adult)   Pulse 75   Ht 167.6 cm (66\")   Wt 68.9 kg (152 lb)   SpO2 100%   BMI 24.53 kg/m²     Physical Exam     Constitutional:       Appearance: Normal appearance. He is well-developed.   HENT:      Head: Normocephalic.   Eyes:      General: Lids are normal.      Conjunctiva/sclera: Conjunctivae normal.      Pupils: Pupils are equal, round, and reactive to light.   Neck:      Thyroid: No thyroid mass or thyromegaly.      Vascular: No carotid bruit, hepatojugular reflux or JVD.      Trachea: Trachea normal.      Comments: Full range of motion without pain.  No tenderness in the " right or left supraclavicular fossa.  No masses and no adenopathy.  Cardiovascular:      Rate and Rhythm: Normal rate and regular rhythm.  No extrasystoles are present.     Chest Wall: PMI is not displaced.      Pulses: Normal pulses.      Heart sounds: Normal heart sounds, S1 normal and S2 normal.      Comments: Equivocal Adson's maneuver on the right.  Negative Adson's maneuver on the left.  Pulmonary:      Effort: Pulmonary effort is normal.      Breath sounds: Normal breath sounds.   Abdominal:      General: Bowel sounds are normal.      Palpations: Abdomen is soft. There is no mass.      Tenderness: There is no abdominal tenderness.      Hernia: No hernia is present.   Musculoskeletal:         General: Normal range of motion.      Cervical back: Normal range of motion and neck supple.      Comments: Normal strength in all muscle groups tested in both upper extremities.   is normal bilaterally.  Range of motion in the right shoulder is limited by pain.  Normal range of motion the left shoulder.  Very tender in the right deltopectoral groove.  No tenderness in the left deltopectoral groove.   Skin:     General: Skin is warm and dry.   Neurological:      Mental Status: He is alert and oriented to person, place, and time.      Cranial Nerves: No cranial nerve deficit.      Sensory: No sensory deficit.      Deep Tendon Reflexes: Reflexes are normal and symmetric.      Comments: Touch and fine motor function are intact bilaterally   Psychiatric:         Speech: Speech normal.         Behavior: Behavior normal.         Thought Content: Thought content normal.         Judgment: Judgment normal.  I have examined the patient completely today and found no significant changes since his initial evaluation.    Result Review :   The following data was reviewed by: Manan Gasca III, MD on 02/01/2022:    MRI of the right brachial plexus was performed January 12, 2022 and was independently reviewed.  There is a 19 x 13  mm hyper intense mass in the posterior right thyroid lobe.  Lower cervical spine shows normal marrow signal intensity.  There is no evidence of marrow edema or fracture.  No obvious disc protrusion or central canal stenosis.  No abnormal intramedullary signal or enhancement in the cervical cord.  Right brachial plexus is normal throughout its course.  There is no mass or extrinsic compression of the brachial plexus.         Assessment and Plan    Diagnoses and all orders for this visit:    1. TOS (thoracic outlet syndrome) (Primary)  -     US Guided Thyroid Biopsy; Future    2. Thyroid nodule  -     US Guided Thyroid Biopsy; Future      Even though the MRI of the right brachial plexus is normal I do not believe that this excludes neurogenic thoracic outlet syndrome.  Patient's symptoms and physical exam certainly point to thoracic outlet syndrome.  We will plan to proceed with an ultrasound directed biopsy of the thyroid.  We will also proceed with physical therapy targeted at the right first rib and right pectoralis minor.  If the patient does not improve with the physical therapy we will then consider a right first rib resection.  Patient will return to see me in 6 weeks with the results of his biopsy and physical therapy evaluation.  I will keep you informed of his progress.    I spent 31 minutes caring for Juan M on this date of service. This time includes time spent by me in the following activities:preparing for the visit, reviewing tests, performing a medically appropriate examination and/or evaluation , counseling and educating the patient/family/caregiver, ordering medications, tests, or procedures, referring and communicating with other health care professionals , documenting information in the medical record, independently interpreting results and communicating that information with the patient/family/caregiver and care coordination  Follow Up   Return in about 6 weeks (around 3/15/2022) for  Recheck.  Patient was given instructions and counseling regarding his condition or for health maintenance advice. Please see specific information pulled into the AVS if appropriate.

## 2022-02-07 ENCOUNTER — HOSPITAL ENCOUNTER (OUTPATIENT)
Dept: ULTRASOUND IMAGING | Facility: HOSPITAL | Age: 37
Discharge: HOME | End: 2022-02-07
Admitting: NURSE PRACTITIONER

## 2022-02-07 PROCEDURE — 76536 US EXAM OF HEAD AND NECK: CPT

## 2022-02-08 ENCOUNTER — TREATMENT (OUTPATIENT)
Dept: PHYSICAL THERAPY | Facility: CLINIC | Age: 37
End: 2022-02-08

## 2022-02-08 DIAGNOSIS — M25.511 ARTHRALGIA OF RIGHT ACROMIOCLAVICULAR JOINT: ICD-10-CM

## 2022-02-08 DIAGNOSIS — G54.0 THORACIC OUTLET SYNDROME: Primary | ICD-10-CM

## 2022-02-08 DIAGNOSIS — M89.511 OSTEOLYSIS OF ACROMIAL END OF RIGHT CLAVICLE: ICD-10-CM

## 2022-02-08 DIAGNOSIS — M25.511 RIGHT SHOULDER PAIN, UNSPECIFIED CHRONICITY: ICD-10-CM

## 2022-02-08 DIAGNOSIS — R29.898 WEAKNESS OF SHOULDER: ICD-10-CM

## 2022-02-08 PROCEDURE — 97530 THERAPEUTIC ACTIVITIES: CPT | Performed by: PHYSICAL THERAPIST

## 2022-02-08 PROCEDURE — 97140 MANUAL THERAPY 1/> REGIONS: CPT | Performed by: PHYSICAL THERAPIST

## 2022-02-08 PROCEDURE — 97112 NEUROMUSCULAR REEDUCATION: CPT | Performed by: PHYSICAL THERAPIST

## 2022-02-08 NOTE — PROGRESS NOTES
"   Physical Therapy Treatment Note      Patient: Juan M Lynch   : 1985  Referring practitioner: Manan Gasca III, MD  Date of Initial Visit: Type: THERAPY  Noted: 2022  Today's Date: 2022  Patient seen for 2 sessions           Visit Diagnoses:    ICD-10-CM ICD-9-CM   1. 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 Right shoulder pain, unspecified chronicity  M25.511 719.41   4. Arthralgia of right acromioclavicular joint  M25.511 719.41   5. Weakness of shoulder  R29.898 719.61       Subjective Evaluation    History of Present Illness  Mechanism of injury: States the arm pain is less due to being off work.   Relates has been off since 22, Dr Gasca took him off work due to long shifts, pain with use, increased shoulder pain with activities at arm length and overhead.      Decreased pain since not using R arm.    Not sleeping well .             Objective           General Comments     Shoulder Comments   TTP over R AC joint, post, lat acromion, bicipital groove    R scapular elevation, winging    ROM:   pt c/o \"crunching\" with UE elevation >80-90.   Pt c/o pain in AC, and acromial region with elevation  Elevation aprox 70 with only trace pain, pain increases at 85-90. Able to go further with increasing pain.  IR limited to post hip      Pt demonstrates poor scapular stability with lifting arm.  Pt has c/o pain subacromially with long lever pressure.         See Exercise, Manual, and Modality Logs for complete treatment.       Assessment & Plan     Assessment    Assessment details: See O: section  Pt demonstrates poor scapular positioning, poor mechanics, weakness in post and lower scapular stabilizers and rotator cuff.   Pt appears to have impingement.    Worked on lower scapular stabilizers, to assist repositioning of scapula, strengthen the shoulder girdle, and decrease impingement subacromially.     Pt tolerated session " well, no c/o with exercises below 90 degrees.    Pt was given WHEP of today's exercises.  Pt related understanding of precautions, progression parameters. Questions were addressed as they arose.     Goals  Plan Goals: STG: 3 weeks  Pt will demonstrate/ report:     -decreased c/o pain R shoulder 2-3/10, 50-75% of the time.    -increased shoulder AROM to 115 degrees flexion, min/no c/o pain.     -increased shoulder AROM to 80 degrees scaption, min/no c/o pain.    -proper scapular positioning/ stabilization     -resumption of dressing / bathing with only moderate compensation due to  shoulder limitations.     -sleeping 75%, or more , of the night without waking due to shoulder pain.     -I in HEP each visit.     -improve score on QUICK DASH.       LT weeks  Pt will report/ demonstrate:     -improved functional use of R shoulder to perform ADLs, hobbies , work duties with min/ no limitation due to shoulder pain.    - R shoulder strength 4-4+/5 to perform home tasks and community activities as needed, with min/no limitation due to shoulder weakness.     -increased B scapular stabilization with functional use of   R UE.    -resumption of hobbies with min limitations due to shoulder pain/weakness.      -improve score on QUICK DASH     -be I in final HEP and progression parameters to perform after formal physical therapy has been discontinued      Plan  Plan details: Continue therapy as planned:   Progress scapular stabilization strengthening, promote scapular depression and retraction,   Education,   RC strengthening,   Progress HEP                Timed:  Manual Therapy:    10     mins  82097;  Therapeutic Exercise:         mins  22600;     Therapeutic Activity:     15     mins  93690;    Neuromuscular Herbie:    15    mins  52621;    Gait Training:           mins  47063;     Ultrasound:          mins  11631;    Mechanical Traction         mins   70556     Un-timed:   Electrical Stimulation         mins  73219  ()  Traction         mins  94979    Timed Treatment:   40   mins   Total Treatment:     45   mins    Cheryl Kinney, PT            Cheryl Kinney PT    Physical Therapist  [place] License [id number]    This document has been electronically signed by Cheryl Kinney PT on February 8, 2022 12:04 EST

## 2022-02-11 ENCOUNTER — TELEPHONE (OUTPATIENT)
Dept: FAMILY MEDICINE CLINIC | Age: 37
End: 2022-02-11

## 2022-02-11 ENCOUNTER — PATIENT ROUNDING (BHMG ONLY) (OUTPATIENT)
Dept: PAIN MEDICINE | Facility: CLINIC | Age: 37
End: 2022-02-11

## 2022-02-11 ENCOUNTER — APPOINTMENT (OUTPATIENT)
Dept: ULTRASOUND IMAGING | Facility: HOSPITAL | Age: 37
End: 2022-02-11

## 2022-02-11 NOTE — TELEPHONE ENCOUNTER
Caller: Juan M Lynch    Relationship: Self    Best call back number: [phone]    What form or medical record are you requesting: MEDICAL RECORD REGARDING ANYTHING PERTAINING TO HIS [employer] CLAIM, LIKE IMAGES AND BLOOD TEST WORK    Who is requesting this form or medical record from you: DOCTOR TERESA UMANZOR    How would you like to receive the form or medical records (pick-up, mail, fax):     Timeframe paperwork needed: AS SOON AS POSSIBLE

## 2022-02-14 ENCOUNTER — APPOINTMENT (OUTPATIENT)
Dept: ULTRASOUND IMAGING | Facility: HOSPITAL | Age: 37
End: 2022-02-14

## 2022-02-15 ENCOUNTER — TREATMENT (OUTPATIENT)
Dept: PHYSICAL THERAPY | Facility: CLINIC | Age: 37
End: 2022-02-15

## 2022-02-15 DIAGNOSIS — R20.2 PARESTHESIAS IN RIGHT HAND: ICD-10-CM

## 2022-02-15 DIAGNOSIS — M25.511 ARTHRALGIA OF RIGHT ACROMIOCLAVICULAR JOINT: ICD-10-CM

## 2022-02-15 DIAGNOSIS — M25.511 RIGHT SHOULDER PAIN, UNSPECIFIED CHRONICITY: ICD-10-CM

## 2022-02-15 DIAGNOSIS — G54.0 THORACIC OUTLET SYNDROME: Primary | ICD-10-CM

## 2022-02-15 DIAGNOSIS — M89.511 OSTEOLYSIS OF ACROMIAL END OF RIGHT CLAVICLE: ICD-10-CM

## 2022-02-15 DIAGNOSIS — R29.898 WEAKNESS OF SHOULDER: ICD-10-CM

## 2022-02-15 PROCEDURE — 97110 THERAPEUTIC EXERCISES: CPT | Performed by: PHYSICAL THERAPIST

## 2022-02-15 PROCEDURE — 97530 THERAPEUTIC ACTIVITIES: CPT | Performed by: PHYSICAL THERAPIST

## 2022-02-15 PROCEDURE — 97112 NEUROMUSCULAR REEDUCATION: CPT | Performed by: PHYSICAL THERAPIST

## 2022-02-15 NOTE — PROGRESS NOTES
"   Physical Therapy Treatment Note      Patient: Juan M Lynch   : 1985  Referring practitioner: Manan Gasca III, MD  Date of Initial Visit: Type: THERAPY  Noted: 2022  Today's Date: 2/15/2022  Patient seen for 3 sessions           Visit Diagnoses:    ICD-10-CM ICD-9-CM   1. 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 Right shoulder pain, unspecified chronicity  M25.511 719.41   4. Arthralgia of right acromioclavicular joint  M25.511 719.41   5. Weakness of shoulder  R29.898 719.61   6. Paresthesias in right hand  R20.2 782.0       Subjective Evaluation    History of Present Illness  Mechanism of injury: States the pain level is  \"the same\" .  Relates the \"grinding\" in the superior shoulder is painful.   Relates hand gets tingly when sleep with R arm overhead.   No c/o with HEP.            Objective           General Comments     Shoulder Comments   Pt demonstrates R shoulder AROM to 85 with min c/o, c/o pain > 90, c/o \"grinding\"   Palpable crepitus subacromially and in AC joint     See Exercise, Manual, and Modality Logs for complete treatment.       Assessment & Plan     Assessment    Assessment details: Tolerated exercises well today as long as avoided > 90 and extension past body  No c/o N/T during session  Slight improvement in posture  continued scapular protraction     Pt was given WHEP of today's exercises.  Pt related understanding of precautions, progression parameters. Questions were addressed as they arose.     Goals  Plan Goals: STG: 3 weeks  Pt will demonstrate/ report:     -decreased c/o pain R shoulder 2-3/10, 50-75% of the time.    -increased shoulder AROM to 115 degrees flexion, min/no c/o pain.     -increased shoulder AROM to 80 degrees scaption, min/no c/o pain.    -proper scapular positioning/ stabilization     -resumption of dressing / bathing with only moderate compensation due to  shoulder limitations. "     -sleeping 75%, or more , of the night without waking due to shoulder pain.     -I in HEP each visit.     -improve score on QUICK DASH.       LT weeks  Pt will report/ demonstrate:     -improved functional use of R shoulder to perform ADLs, hobbies , work duties with min/ no limitation due to shoulder pain.    - R shoulder strength 4-4+/5 to perform home tasks and community activities as needed, with min/no limitation due to shoulder weakness.     -increased B scapular stabilization with functional use of   R UE.    -resumption of hobbies with min limitations due to shoulder pain/weakness.      -improve score on QUICK DASH     -be I in final HEP and progression parameters to perform after formal physical therapy has been discontinued      Plan  Plan details: Continue therapy as planned:   Progress scapular stabilization strengthening, promote scapular depression and retraction,   Education,   RC strengthening,   Progress HEP                Timed:  Manual Therapy:         mins  36923;  Therapeutic Exercise:    10     mins  62518;     Therapeutic Activity:     15     mins  92539;    Neuromuscular Herbie:    15    mins  52044;    Gait Training:           mins  39637;     Ultrasound:          mins  80446;    Mechanical Traction         mins   45815     Un-timed:   Electrical Stimulation         mins  99186 ()  Traction          mins  49734    Timed Treatment:   40   mins   Total Treatment:     45   mins    Cheryl Kinney, PT            Cheryl Kinney, PT    Physical Therapist  [place] License [id number]    This document has been electronically signed by Cheryl Kinney PT on February 15, 2022 16:36 EST

## 2022-02-22 ENCOUNTER — TELEPHONE (OUTPATIENT)
Dept: FAMILY MEDICINE CLINIC | Age: 37
End: 2022-02-22

## 2022-02-22 NOTE — TELEPHONE ENCOUNTER
HUB TO READ    CALLED PT, LOOKS LIKE THEY HAVE BEEN APPROVED WITH OUR [employer] [phone]. PT STATED HE WILL CALL THEM AND SEE WHEN HE CAN  PAPERWORK.

## 2022-02-22 NOTE — TELEPHONE ENCOUNTER
Caller: Juan M Lynch    Relationship: Self    Best call back number: [phone]    What is the best time to reach you: ANYTIME, CAN LEAVE A DETAILED MESSAGE IF NO ANSWER.      Who are you requesting to speak with (clinical staff, provider,  specific staff member)  NURSE       What was the call regarding: PATIENT IS WANTING TO VERIFY THAT THE MEDICAL RECORDS THAT HE REQUESTED LAST WEEK OR THE WEEK BEFORE IS READY TO BE PICKED UP FOR HIS APPOINTMENT THAT HE HAS AT [employer]. TOMORROW.  PLEASE ADVISE PATIENT.     Do you require a callback: YES

## 2022-02-23 ENCOUNTER — TELEPHONE (OUTPATIENT)
Dept: SURGERY | Facility: CLINIC | Age: 37
End: 2022-02-23

## 2022-02-23 NOTE — TELEPHONE ENCOUNTER
Caller: Juan M Lynch    Relationship: Self    Best call back number: [phone]    What is the best time to reach you: ASAP    Who are you requesting to speak with (clinical staff, provider,  specific staff member): DR LAGUERRE    Do you know the name of the person who called:     What was the call regarding: PT HAS CALLED X4 TODAY DUE TO INCORRECT PAPERWORK AS A RESULT OF BEING PUT OFF WORK AFTER SURGERY. PT BELIEVES HE WILL NOW NEED DOCUMENTATION FOR JAN 29TH AND 30TH. PT'S INCOME IS NOT BEING RELEASED BECAUSE OF THE ERRORS IN THE PAPERWORK AND HIS HOUSE PAYMENTS AND BILLS ARE DUE. PT WOULD LIKE DR LAGUERRE TO CALL HIM TO VERIFY SOME THINGS.    Do you require a callback: YES

## 2022-02-24 ENCOUNTER — TELEPHONE (OUTPATIENT)
Dept: SURGERY | Facility: CLINIC | Age: 37
End: 2022-02-24

## 2022-02-24 NOTE — TELEPHONE ENCOUNTER
"Spoke with Radhika the  for his STD for Mr. Lynch. After his appointment on 2/1/2022 Dr. Gasca took the patient off work until he was re-evaluated on 3/15/2022. There was an accidental finding of a thyroid mass while working the patient up for thoracic outlet syndrome. Dr. Gasca believed it was best to go ahead and biopsy the mass to rule out malignancy. Due to the accidental finding we had to put on hold officially diagnosing the patient for TOS and possibly scheduling the patient for surgery. Radhika continued asking questions regarding his left arm and stated, \"is there no way he can use his left arm?\" I read over Dr. Curiel note and there is nothing stating that he could not use his left arm so technically from our standpoint he can still use his left arm. Radhika stated that they have accommodations so the employee can use his left arm but the patient is right hand dominant.     After ending the phone conversation with Radhika I called the patient to explain what we discussed. I explained to him that I will speak with Dr. Gasca and our . At this point there is nothing we can do if we can not prove that he can't use his left arm or use the accommodations that his work offers. Radhika did explain that working light duty with the accommodations will benefit the patient more.   "

## 2022-02-25 ENCOUNTER — TELEPHONE (OUTPATIENT)
Dept: PAIN MEDICINE | Facility: CLINIC | Age: 37
End: 2022-02-25

## 2022-02-25 NOTE — TELEPHONE ENCOUNTER
Patient would like to know what the best OTC medication he should buy for his right shoulder pain and his arm falling asleep.

## 2022-02-25 NOTE — TELEPHONE ENCOUNTER
Provider: DR. VILLATORO  Caller: CANDICE JONES  Relationship to Patient: SELF     Phone Number: [phone]  Reason for Call: OVER THE COUNTER MEDICATION RECOMMENDATIONS  When was the patient last seen: 1-27-22    Where is it located: RT SHOULDER    PT WOULD LIKE A CALL BACK TO DISCUSS OVER THE COUNTER MEDICATIONS FOR PAIN.  PT HAS FOLLOW UP APPT 3-8-22 WITH LARA LEBRON, AFTER THYROID BIOPSY 2-28-22 AT [facility]

## 2022-02-25 NOTE — TELEPHONE ENCOUNTER
Discussion/Summary   Devendra Saldaña,      Just wanted to let you know that your mammogram was normal   Thanks  Be well,      Sierra Munroe DO     Verified Results  * MAMMO SCREENING BILATERAL W CAD 01Jun2017 11:13AM Signa Clamp Order Number: [id number]   Performing Comments: 69 yo female in need of annual mammogram   Thanks  Sierra Munroe DO   - Patient Instructions: To schedule this appointment, please contact Central Scheduling at [phone]  Do not wear any perfume, powder, lotion or deodorant    on breast or underarm area  Please bring your doctors order, referral (if needed) and insurance information with you on the day of the test  Failure to bring this information may result in this test being rescheduled  Arrive 15 minutes prior to your appointment time to register  On the day of your test, please bring any prior mammogram or breast studies with you that were not performed at a [facility]  Failure to bring prior exams may result in your test needing to be rescheduled  Test Name Result Flag Reference   MAMMO SCREENING BILATERAL W CAD (Report)     Patient History:   Patient is postmenopausal    No known family history of cancer  Patient has never smoked  Patient's BMI is 18 3  Reason for exam: screening, asymptomatic  Mammo Screening Bilateral W CAD: June 1, 2017 - Check In #:    [de-identified]   Bilateral MLO, CC, and XCCL view(s) were taken  Technologist: HARISH Alva (HARISH)(M)   Prior study comparison: April 21, 2016, mammo screening bilateral   W CAD performed at [facility]  March 12, 2015, bilateral digital screening mammogram performed at [facility]  January 10, 2014, bilateral    digital screening mammogram performed at [facility]  December 13, 2012, bilateral screening    mammogram, performed at [address] [place] [address]      October 13, 2011, bilateral Lidocaine patches may help with sharp pain.  Otherwise the best things over the counter are probably acetaminophen and ibuprofen.  He may rotate these.  I'm glad he has his biopsy scheduled! screening mammogram, performed at    [address]  The breast tissue is heterogeneously dense, potentially limiting    the sensitivity of mammography  Patient risk, included in this    report, assists in determining the appropriate screening regimen    (such as 3-D mammography or the inclusion of automated breast    ultrasound or MRI)  3-D mammography may also remain indicated as    screening  No dominant soft tissue mass, architectural distortion or    suspicious calcifications are noted in either breast   The skin    and nipple contours are within normal limits  No evidence of malignancy  No significant changes when compared with prior studies  ACR BI-RADSï¾® Assessments: BiRad:1 - Negative     Recommendation:   Routine screening mammogram of both breasts in 1 year  A    reminder letter will be scheduled  Analyzed by CAD     8-10% of cancers will be missed on mammography  Management of a    palpable abnormality must be based on clinical grounds  Patients   will be notified of their results via letter from our facility  Accredited by [employer] of Radiology and FDA       Transcription Location:  [address]: [id number]     Risk Value(s):   Tyrer-Cuzick 10 Year: 2 200%, Tyrer-Cuzick Lifetime: 3 200%,    Myriad Table: 1 5%, MAIN 5 Year: 1 6%, NCI Lifetime: 4 3%   Signed by:   Juliann Wong MD   6/1/17

## 2022-02-28 ENCOUNTER — HOSPITAL ENCOUNTER (OUTPATIENT)
Dept: ULTRASOUND IMAGING | Facility: HOSPITAL | Age: 37
Discharge: HOME | End: 2022-02-28
Admitting: THORACIC SURGERY (CARDIOTHORACIC VASCULAR SURGERY)

## 2022-02-28 DIAGNOSIS — G54.0 TOS (THORACIC OUTLET SYNDROME): ICD-10-CM

## 2022-02-28 DIAGNOSIS — E04.1 THYROID NODULE: ICD-10-CM

## 2022-02-28 PROCEDURE — 0 LIDOCAINE 1 % SOLUTION: Performed by: THORACIC SURGERY (CARDIOTHORACIC VASCULAR SURGERY)

## 2022-02-28 PROCEDURE — 76942 ECHO GUIDE FOR BIOPSY: CPT

## 2022-02-28 PROCEDURE — 88173 CYTOPATH EVAL FNA REPORT: CPT | Performed by: THORACIC SURGERY (CARDIOTHORACIC VASCULAR SURGERY)

## 2022-02-28 RX ORDER — LIDOCAINE HYDROCHLORIDE 10 MG/ML
10 INJECTION, SOLUTION INFILTRATION; PERINEURAL ONCE
Status: COMPLETED | OUTPATIENT
Start: 2022-02-28 | End: 2022-02-28

## 2022-02-28 RX ADMIN — LIDOCAINE HYDROCHLORIDE 10 ML: 10 INJECTION, SOLUTION INFILTRATION; PERINEURAL at 11:15

## 2022-03-02 LAB
CYTO UR: NORMAL
LAB AP CASE REPORT: NORMAL
LAB AP CLINICAL INFORMATION: NORMAL
LAB AP NON-GYN SPECIMEN ADEQUACY: NORMAL
PATH REPORT.FINAL DX SPEC: NORMAL
PATH REPORT.GROSS SPEC: NORMAL

## 2022-03-04 ENCOUNTER — TELEPHONE (OUTPATIENT)
Dept: SURGERY | Facility: CLINIC | Age: 37
End: 2022-03-04

## 2022-03-04 NOTE — TELEPHONE ENCOUNTER
Caller: Juan M Lynch    Relationship: Self    Best call back number: [phone]    What test was performed: US GUIDED THYROID BIOPSY  When was the test performed: 2.28.22        Additional notes: PATIENT WOULD LIKE SOMEONE TO CALL AND READ HIM HIS RESULTS

## 2022-03-10 ENCOUNTER — OFFICE VISIT (OUTPATIENT)
Dept: PAIN MEDICINE | Facility: CLINIC | Age: 37
End: 2022-03-10

## 2022-03-10 VITALS
SYSTOLIC BLOOD PRESSURE: 131 MMHG | TEMPERATURE: 96.6 F | WEIGHT: 150 LBS | HEIGHT: 66 IN | OXYGEN SATURATION: 99 % | DIASTOLIC BLOOD PRESSURE: 87 MMHG | BODY MASS INDEX: 24.11 KG/M2 | HEART RATE: 55 BPM | RESPIRATION RATE: 12 BRPM

## 2022-03-10 DIAGNOSIS — G89.29 CHRONIC RIGHT SHOULDER PAIN: Primary | ICD-10-CM

## 2022-03-10 DIAGNOSIS — R20.2 PARESTHESIAS IN RIGHT HAND: ICD-10-CM

## 2022-03-10 DIAGNOSIS — M25.511 CHRONIC RIGHT SHOULDER PAIN: Primary | ICD-10-CM

## 2022-03-10 PROCEDURE — 99214 OFFICE O/P EST MOD 30 MIN: CPT | Performed by: NURSE PRACTITIONER

## 2022-03-10 RX ORDER — GABAPENTIN 300 MG/1
300 CAPSULE ORAL NIGHTLY
Qty: 30 CAPSULE | Refills: 1 | Status: SHIPPED | OUTPATIENT
Start: 2022-03-10 | End: 2022-05-12

## 2022-03-10 NOTE — PROGRESS NOTES
CHIEF COMPLAINT  FOLLOW UP SHOULDER PAIN .    Pt reports to office F/U visit , states when doing any type of movement involving his right shoulder his pain increases.    Subjective   Juan M Lynch is a 36 y.o. male  who presents for follow-up.  He has a history of chronic right shoulder pain.  Reports his pain is WORSEsince last evaluation.    Complains of pain in his right shoulder and upper extremity. Today his pain is 6/10VAS. Describes the pain as continuous throbbing and burning. Pain increases with certain positions, reaching, over-head use of arm; pain decreases with rest, changing position, PT. Pain is interfering with his sleep.  ADL's by self.     Injury in October 2021.  Reports his W/C is no longer covering PT.   W/C person is Radhika Chávez.     He has questions about his recent FNA biopsy. He reviewed results in [facility]. Has questions about results.    Patient was initially evaluated by Dr. Felicia Gonzalez on 1/27/2022 as a new patient.  He was referred here by Dr. Gasca for evaluation of right shoulder pain.  Has to onset of events in February 2021 in October 2021.  In October 2020 when he hurt his right shoulder while pulling.  Reviewed labs and imaging.  Explained results of MRI showing thyroid mass.  Recommend consideration for ultrasound and possible biopsy.  Plan to schedule right interscalene nerve block.  He will call once his thyroid has been worked up and we will move forward with the injections.  Plan to follow-up after thyroid work-up.    Patient remained masked during entire encounter. No cough present. I donned a mask and eye protection throughout entire visit. Prior to donning mask and eye protection, hand hygiene was performed, as well as when it was doffed.  I was closer than 6 feet, but not for an extended period of time. No obvious exposure to any bodily fluids.    History of Present Illness     The patient has a pain history of the following:  Right Thoracic outlet syndrome       Previous interventions that the patient has received include:   Shoulder injections - helped a few weeks     Pain medications include:  Ibuprofen     Previously: Tramadol (constipation), Prednisone (not much benefit)     Other conservative modalities which the patient reports using include:  Physical Therapy: yes  Chiropractor: no  Massage Therapy: no  TENS: no  Neck or back surgery: no  Past pain management: no     Past Significant Surgical History:  None     PROCEDURE:  MRI BRACHIAL PLEXUS UPPER EXTREMITY RIGHT W WO CONTRAST     COMPARISON: [facility], MRI SHOULDER RIGHT WO CONTRAST, 10/13/2021, 8:57.     INDICATIONS:  NECK PAIN, RIGHT SHOULDER PAIN WITH NUMBNESS IN RIGHT HAND     TECHNIQUE:    Multiplanar multisequence images of the right brachial plexus with and without   intravenous gadolinium.     FINDINGS:  There is a 1.9 cm x 1.3 cm T1/T2 hyperintense mass in the posterior right thyroid lobe.  Marrow   signal intensity is normal.  No evidence of marrow edema or fracture.  No evidence of significant   focal disc protrusion or central canal stenosis in the cervical spine.  No evidence of abnormal   intramedullary signal or enhancement in the cervical cord.  The right brachial plexus has a normal   appearance.  No evidence of mass or abnormal enhancement.  No evidence of thoracic outlet   obstruction.     IMPRESSION:      1. 1.9 cm x 1.3 cm right thyroid mass.  Recommend a thyroid ultrasound examination for further   evaluation.     2. Normal MRI of the right brachial plexus.       MAGDA FINN MD         Electronically Signed and Approved By: MAGDA FINN MD on 1/12/2022 at 13:19          PROCEDURE:  MRI SHOULDER RIGHT WO CONTRAST     COMPARISON: [facility], SHOULDER >OR= 2V LT, 9/08/2015, 12:09.  [facility], XR SHOULDER 2+ VW RIGHT, 7/26/2021, 9:10.     INDICATIONS:  RIGHT SHOULDER PAIN,POPPING, AND LIMITED RANGE OF MOTION SINCE FEELING  TEARING IN   SHOULDER IN FEB OR MARCH 2021                         TECHNIQUE:    A variety of imaging planes and parameters were utilized for visualization of suspected   pathology.  Images were performed without contrast.       FINDINGS:          No fracture or malalignment is identified.  T2 high signal consistent with edema is noted in the   distal clavicle and adjacent acromion.  A small amount of fluid is noted in the joint space.     No rotator cuff abnormality is seen.  No muscle body atrophy is evident.     The biceps long head tendon and its attachment to the superior labrum are intact.  Mild   intermediate signal in the biceps tendon is consistent with tendinopathy no labral tear is seen.     Cartilage in the glenohumeral joint is intact.  No joint effusion or loose body is seen.           CONCLUSION:   1. Edema in the distal clavicle and adjacent acromion.  This is at least in part secondary to early   osteoarthritis.  Correlate clinically for the possibility of osteolysis related to chronic   repetitive type injury.  Also correlate clinically for the possibility of hyperparathyroidism.  2. Mild biceps tendinopathy               Merritt Pérez M.D.         Electronically Signed and Approved By: Merritt Pérez M.D. on 10/13/2021 at 14:39       PEG Assessment   What number best describes your pain on average in the past week?9  What number best describes how, during the past week, pain has interfered with your enjoyment of life?9  What number best describes how, during the past week, pain has interfered with your general activity?  9    The following portions of the patient's history were reviewed and updated as appropriate: allergies, current medications, past family history, past medical history, past social history, past surgical history and problem list.    Review of Systems   Constitutional: Positive for fatigue. Negative for activity change and fever.   HENT: Negative for congestion.    Eyes: Negative  "for visual disturbance.   Respiratory: Negative for cough and chest tightness.    Cardiovascular: Negative for chest pain.   Gastrointestinal: Negative for constipation and diarrhea.   Genitourinary: Negative for difficulty urinating and dysuria.   Neurological: Positive for weakness (right arm ) and numbness (right hand right arm). Negative for dizziness, light-headedness and headaches.   Psychiatric/Behavioral: Positive for agitation and sleep disturbance. Negative for suicidal ideas. The patient is nervous/anxious.      I have reviewed and confirmed the accuracy of the ROS as documented by the MA/LPN/RN KADEN Rios      Vitals:    03/10/22 0837   BP: 131/87   Pulse: 55   Resp: 12   Temp: 96.6 °F (35.9 °C)   SpO2: 99%   Weight: 68 kg (150 lb)   Height: 167.6 cm (66\")   PainSc:   6   PainLoc: Shoulder     Objective   Physical Exam  Vitals and nursing note reviewed.   Constitutional:       Appearance: He is well-developed.   HENT:      Head: Normocephalic and atraumatic.   Neck:     Musculoskeletal:        Arms:       Comments: Guarding of RUE   Neurological:      Mental Status: He is alert.      Gait: Gait normal.      Deep Tendon Reflexes:      Reflex Scores:       Bicep reflexes are 2+ on the right side and 2+ on the left side.       Brachioradialis reflexes are 2+ on the right side and 2+ on the left side.  Psychiatric:         Speech: Speech normal.         Behavior: Behavior normal.         Thought Content: Thought content normal.         Judgment: Judgment normal.         Assessment/Plan   Diagnoses and all orders for this visit:    1. Chronic right shoulder pain (Primary)    2. Paresthesias in right hand      --- Follow up Dr Gasca in regards to thyroid biopsy.  --- Consider right intrascalene block for TOS evaluation. Will await approval from Dr Gasca per Dr Gonzalez.  Need approval from Dr Gasca that steroid injection will not interfere with patient's thyroid mass.  --- Trial of Gabapentin 300 " mg nightly PRN. Discussed medication with the patient.  Included in this discussion was the potential for side effects and adverse events.  Patient verbalized understanding and wished to proceed.  Prescription will be sent to pharmacy.  --- Follow-up 2 months or sooner if needed.       BIANCA REPORT  As part of the patient's treatment plan, I am prescribing controlled substances. The patient has been made aware of appropriate use of such medications, including potential risk of somnolence, limited ability to drive and/or work safely, and the potential for dependence or overdose. It has also bee made clear that these medications are for use by this patient only, without concomitant use of alcohol or other substances unless prescribed.     Patient has completed prescribing agreement detailing terms of continued prescribing of controlled substances, including monitoring BIANCA reports, urine drug screening, and pill counts if necessary. The patient is aware that inappropriate use will results in cessation of prescribing such medications.    As the clinician, I personally reviewed the BIANCA from 3-10-22 while the patient was in the office today.    History and physical exam exhibit continued safe and appropriate use of controlled substances.       Dictated utilizing Dragon dictation.     This document is intended for medical expert use only. Reading of this document by patients and/or patient's family without participating medical staff guidance may result in misinterpretation and unintended morbidity.   Any interpretation of such data is the responsibility of the patient and/or family member responsible for the patient in concert with their primary or specialist providers, not to be left for sources of online searches such as [employer], [employer] or similar queries. Relying on these approaches to knowledge may result in misinterpretation, misguided goals of care and even death should patients or family members try  recommendations outside of the realm of professional medical care in a supervised way.

## 2022-03-11 ENCOUNTER — DOCUMENTATION (OUTPATIENT)
Dept: PHYSICAL THERAPY | Facility: CLINIC | Age: 37
End: 2022-03-11

## 2022-03-11 DIAGNOSIS — G54.0 THORACIC OUTLET SYNDROME: Primary | ICD-10-CM

## 2022-03-11 DIAGNOSIS — R29.898 WEAKNESS OF SHOULDER: ICD-10-CM

## 2022-03-11 DIAGNOSIS — M25.511 RIGHT SHOULDER PAIN, UNSPECIFIED CHRONICITY: ICD-10-CM

## 2022-03-11 DIAGNOSIS — M25.511 ARTHRALGIA OF RIGHT ACROMIOCLAVICULAR JOINT: ICD-10-CM

## 2022-03-11 DIAGNOSIS — M89.511 OSTEOLYSIS OF ACROMIAL END OF RIGHT CLAVICLE: ICD-10-CM

## 2022-03-11 NOTE — PROGRESS NOTES
Discharge Summary from Physical Therapy        Patient Information  Juan M Lynch  1985  Diagnosis/ICD - 10 Code:  Thoracic outlet syndrome [G54.0]  Referring practitioner: No ref. provider found  Date of initial visit: 3/11/2022  Today's date: 3/11/2022  Patient was seen for Visit count could not be calculated. Make sure you are using a visit which is associated with an episode. sessions      Visit Diagnoses:    ICD-10-CM ICD-9-CM   1. 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 Right shoulder pain, unspecified chronicity  M25.511 719.41   4. Arthralgia of right acromioclavicular joint  M25.511 719.41   5. Weakness of shoulder  R29.898 719.61         Discharge Status of Patient: See PT Note dated 2/15/22    Goals: progressing    Discharge Plan: Continue with current home exercise program as instructed    Comments: Insurance denied therapy                  PT SIGNATURE: Cheryl Kinney PT MS,PT  [place] License: [id number]    This document has been electronically signed by Cheryl Kinney, PT on March 11, 2022 11:06 EST

## 2022-03-15 ENCOUNTER — TELEPHONE (OUTPATIENT)
Dept: SURGERY | Facility: CLINIC | Age: 37
End: 2022-03-15

## 2022-03-15 ENCOUNTER — OFFICE VISIT (OUTPATIENT)
Dept: SURGERY | Facility: CLINIC | Age: 37
End: 2022-03-15

## 2022-03-15 VITALS
DIASTOLIC BLOOD PRESSURE: 80 MMHG | BODY MASS INDEX: 24.11 KG/M2 | HEIGHT: 66 IN | HEART RATE: 69 BPM | OXYGEN SATURATION: 98 % | SYSTOLIC BLOOD PRESSURE: 128 MMHG | WEIGHT: 150 LBS

## 2022-03-15 DIAGNOSIS — E04.1 THYROID NODULE: ICD-10-CM

## 2022-03-15 DIAGNOSIS — G54.0 TOS (THORACIC OUTLET SYNDROME): Primary | ICD-10-CM

## 2022-03-15 PROCEDURE — 99212 OFFICE O/P EST SF 10 MIN: CPT | Performed by: THORACIC SURGERY (CARDIOTHORACIC VASCULAR SURGERY)

## 2022-03-15 NOTE — TELEPHONE ENCOUNTER
Called patients work comp  no answer left message for Vibha to call me back regarding authorized treatment

## 2022-03-15 NOTE — PROGRESS NOTES
"Chief Complaint  Neck and arm pain    Subjective          Juan M Lynch presents to [facility] THORACIC SURGERY  History of Present Illness     Mr. Lynch was seen in the office today for further follow-up of neck and right shoulder pain radiating into the right arm and hand associated with numbness and paresthesias.  I felt that he has neurogenic thoracic outlet syndrome.  I sent him for interscalene blocks.  He was found to have a mass in his thyroid.  He has subsequently underwent fine-needle aspirate of this mass.  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  There has been a problem with Workmen's Compensation in getting the go ahead to proceed with physical therapy and the interscalene blocks.  Essentially since his initial visit there is been no treatment for his neurogenic thoracic outlet syndrome.  Obviously his symptoms remain the same and may even be a little worse since I last saw him in February.    Objective   Vital Signs:   /80 (BP Location: Right arm, Patient Position: Sitting, Cuff Size: Adult)   Pulse 69   Ht 167.6 cm (66\")   Wt 68 kg (150 lb)   SpO2 98%   BMI 24.21 kg/m²     Physical Exam     Constitutional:       Appearance: Normal appearance. He is well-developed.   HENT:      Head: Normocephalic.   Eyes:      General: Lids are normal.      Conjunctiva/sclera: Conjunctivae normal.      Pupils: Pupils are equal, round, and reactive to light.   Neck:      Thyroid: No thyroid mass or thyromegaly.      Vascular: No carotid bruit, hepatojugular reflux or JVD.      Trachea: Trachea normal.      Comments: Full range of motion without pain.  No tenderness in the right or left supraclavicular fossa.  No masses and no adenopathy.  Cardiovascular:      Rate and Rhythm: Normal rate and regular rhythm.  No extrasystoles are present.     Chest Wall: PMI is not displaced.      Pulses: Normal pulses.      Heart sounds: Normal heart sounds, S1 normal and S2 " normal.      Comments: Equivocal Adson's maneuver on the right.  Negative Adson's maneuver on the left.  Pulmonary:      Effort: Pulmonary effort is normal.      Breath sounds: Normal breath sounds.   Abdominal:      General: Bowel sounds are normal.      Palpations: Abdomen is soft. There is no mass.      Tenderness: There is no abdominal tenderness.      Hernia: No hernia is present.   Musculoskeletal:         General: Normal range of motion.      Cervical back: Normal range of motion and neck supple.      Comments: Normal strength in all muscle groups tested in both upper extremities.   is normal bilaterally.  Range of motion in the right shoulder is limited by pain.  Normal range of motion the left shoulder.  Very tender in the right deltopectoral groove.  No tenderness in the left deltopectoral groove.   Skin:     General: Skin is warm and dry.   Neurological:      Mental Status: He is alert and oriented to person, place, and time.      Cranial Nerves: No cranial nerve deficit.      Sensory: No sensory deficit.      Deep Tendon Reflexes: Reflexes are normal and symmetric.      Comments: Touch and fine motor function are intact bilaterally   Psychiatric:         Speech: Speech normal.         Behavior: Behavior normal.         Thought Content: Thought content normal.         Judgment: Judgment normal.  Patient was fully examined today.  I have found no significant changes since his initial exam.      Result Review :   The following data was reviewed by: Manan Gasca III, MD on 03/15/2022:    Pathology:    Final Diagnosis   Thyroid gland, right lobe, FNA:               - Suspicious for a follicular neoplasm ([place] category IV)              Assessment and Plan    Diagnoses and all orders for this visit:    1. TOS (thoracic outlet syndrome) (Primary)  -     Ambulatory Referral to Physical Therapy Evaluate and treat  -     Ambulatory Referral to Pain Management    2. Thyroid nodule  -     Ambulatory  Referral to ENT (Otolaryngology)        Clearly this follicular neoplasm needs to be addressed.  Patient lives in [facility] and is asked for referral closer to home.  I have referred him to [facility] who has a satellite clinic in [place] which is easier for him to get to.  We will await their evaluation.    I do not believe that there should be a problem proceeding on with his interscalene blocks and his physical therapy.  Have reordered both of these treatments for his right arm pain which is quite severe.  Patient will return to see me in 6 weeks for reevaluation.    I spent 15 minutes caring for Juan M on this date of service. This time includes time spent by me in the following activities:preparing for the visit, reviewing tests, performing a medically appropriate examination and/or evaluation , counseling and educating the patient/family/caregiver, ordering medications, tests, or procedures, referring and communicating with other health care professionals , documenting information in the medical record, independently interpreting results and communicating that information with the patient/family/caregiver and care coordination  Follow Up   Return in about 6 weeks (around 4/26/2022) for Recheck.  Patient was given instructions and counseling regarding his condition or for health maintenance advice. Please see specific information pulled into the AVS if appropriate.

## 2022-03-15 NOTE — TELEPHONE ENCOUNTER
Patients updated  is   Radhika Chávez  Phone #  [phone]    Called  and left message with appropriate rep.

## 2022-03-31 ENCOUNTER — TELEPHONE (OUTPATIENT)
Dept: SURGERY | Facility: CLINIC | Age: 37
End: 2022-03-31

## 2022-03-31 NOTE — TELEPHONE ENCOUNTER
Caller: Juan M Lynch    Relationship: Self    Best call back number: [phone]  What is the best time to reach you: ANY    Who are you requesting to speak with (clinical staff, provider,  specific staff member): YOVANI    What was the call regardin. PT RECEIVED REFERRAL TO [facility]- THEY ARE NOT IN NETWORK- PT STATED HE SPOKE WITH YOVANI WHO WAS WORKING ON THIS ISSUE WITH HIM TO GET HIM ANOTHER REFERRAL TO A CLOSER PRACTICE. PT RECEIVED ANOTHER REFERRAL TO [facility]- THEY CALLED PT- PT CURIOUS OF WHY &  STATUS OF GETTING HIM TO A PRACTICE CLOSER WITHIN NETWORK.    2.PT SAID HE HAS BEEN WORKING WITH YOVANI REGARDING PAPERWORK FOR HIS WORKERS COMP- HE NEEDS THE PAPERWORK YOVANI SENT TO HIS  ALSO SENT TO BEAU WITH [employer]- SHE WORKS WITH [employer]-HE REQUESTED THAT THIS PAPERWORK MUST BE SENT TO [facility] BY 4.2.22 PER BEAU. HER FAX NUMBER [phone]. HE ALSO LEFT MARIELENA   EMAIL: [email or web address] & HER PHONE NUMBER: [phone] EXT [phone]    3. PT ALSO MENTIONED HE WANTS TO UNDERSTAND WHY SUPPLEMENTATION HAS BEEN PROLONGED TO CANCER DOCTOR-  HE NEEDS RECENT MED RECS + OFFICE NOTE SENT TO [facility] AS WELL. I PROVIDED HIM WITH [phone] PHONE NUMBER TO MAKE A MED RECS REQUEST.    Do you require a callback: YES

## 2022-04-05 ENCOUNTER — TELEPHONE (OUTPATIENT)
Dept: SURGERY | Facility: CLINIC | Age: 37
End: 2022-04-05

## 2022-04-05 NOTE — TELEPHONE ENCOUNTER
Caller: Juan M Lynch    Relationship: Self    Best call back number: [phone]    What is the best time to reach you: ASAP    Who are you requesting to speak with (clinical staff, provider,  specific staff member): YOVANI    What was the call regarding: PT STATES HE HAS SPOKE TO YOVANI SEVERAL TIMES ALREADY AND SHE WILL KNOW ABOUT IT.     Do you require a callback: YES

## 2022-04-06 ENCOUNTER — TREATMENT (OUTPATIENT)
Dept: PHYSICAL THERAPY | Facility: CLINIC | Age: 37
End: 2022-04-06

## 2022-04-06 DIAGNOSIS — M79.601 PAIN OF RIGHT UPPER EXTREMITY: ICD-10-CM

## 2022-04-06 DIAGNOSIS — G54.0 TOS (THORACIC OUTLET SYNDROME): Primary | ICD-10-CM

## 2022-04-06 PROCEDURE — 97162 PT EVAL MOD COMPLEX 30 MIN: CPT | Performed by: PHYSICAL THERAPIST

## 2022-04-13 ENCOUNTER — TREATMENT (OUTPATIENT)
Dept: PHYSICAL THERAPY | Facility: CLINIC | Age: 37
End: 2022-04-13

## 2022-04-13 DIAGNOSIS — R20.2 PARESTHESIAS IN RIGHT HAND: ICD-10-CM

## 2022-04-13 DIAGNOSIS — G54.0 THORACIC OUTLET SYNDROME: ICD-10-CM

## 2022-04-13 DIAGNOSIS — M89.511 OSTEOLYSIS OF ACROMIAL END OF RIGHT CLAVICLE: ICD-10-CM

## 2022-04-13 DIAGNOSIS — M25.511 ARTHRALGIA OF RIGHT ACROMIOCLAVICULAR JOINT: ICD-10-CM

## 2022-04-13 DIAGNOSIS — G54.0 TOS (THORACIC OUTLET SYNDROME): Primary | ICD-10-CM

## 2022-04-13 DIAGNOSIS — M25.511 RIGHT SHOULDER PAIN, UNSPECIFIED CHRONICITY: ICD-10-CM

## 2022-04-13 DIAGNOSIS — M79.601 PAIN OF RIGHT UPPER EXTREMITY: ICD-10-CM

## 2022-04-13 DIAGNOSIS — R29.898 WEAKNESS OF SHOULDER: ICD-10-CM

## 2022-04-13 PROCEDURE — 97110 THERAPEUTIC EXERCISES: CPT | Performed by: PHYSICAL THERAPIST

## 2022-04-13 PROCEDURE — 97140 MANUAL THERAPY 1/> REGIONS: CPT | Performed by: PHYSICAL THERAPIST

## 2022-04-13 NOTE — PROGRESS NOTES
0Physical Therapy Daily Treatment Note      Patient: Juan M Lynch   : 1985  Referring practitioner: Manan Gasca III, MD  Date of Initial Visit: Type: THERAPY  Noted: 2022  Today's Date: 2022  Patient seen for 2 sessions           Subjective Evaluation    History of Present Illness    Subjective comment: 7/10       Objective   See Exercise, Manual, and Modality Logs for complete treatment.       Assessment & Plan     Assessment  Impairments: abnormal muscle firing, abnormal or restricted ROM, activity intolerance, impaired physical strength and pain with function  Functional Limitations: carrying objects, lifting, sleeping, pulling, pushing, uncomfortable because of pain, reaching behind back, reaching overhead and unable to perform repetitive tasks  Assessment details: L SHLD is visibly rooled forward as compared to the R. Erect spinal positioning was a focus at this tx session in order to correct the muscle patterns.  Pt has tightness and the feel of spinal rotation from the C7 vertebre to ` T4. Thoracic region in this area feels bond and restricted. Tenderness with palpation along the upper traps and 1st as well as vertebrae in the thoracic region.      Goals  Plan Goals: SHOULDER  PROBLEMS:     1. The patient has limited ROM of the right shoulder.    LTG 1: 12 weeks:  The patient will demonstrate 160 degrees of right shoulder flexion, 150 degrees of shoulder abduction, 70 degrees of shoulder external rotation, and 70 degrees of shoulder internal rotation to allow the patient to reach into upper kitchen cabinets and manipulate clothing behind the back with greater ease.    STATUS:  New   STG 1a: 4 weeks:  The patient will demonstrate 155 degrees of right shoulder flexion, 120 degrees of shoulder abduction, 50 degrees of shoulder external rotation, and 50 degrees of shoulder internal rotation.    STATUS:  New   TREATMENT: Manual therapy, therapeutic exercise, home exercise instruction, and  modalities as needed to include: electrical stimulation, ultrasound, moist heat, and ice.    2. The patient has limited strength of the right shoulder.   LTG 2: 12 weeks:  The patient will demonstrate 4 /5 strength for right shoulder flexion, abduction, external rotation, and internal rotation in order to demonstrate improved shoulder stability.    STATUS:  New   STG 2a: 4 weeks:  The patient will demonstrate 3+ /5 strength for right shoulder flexion, abduction, external rotation, and internal rotation.    STATUS:  New   STG2b:  4 weeks:  The patient will be independent with home exercises.     STATUS:  New   TREATMENT: Manual therapy, therapeutic exercise, home exercise instruction, and modalities as needed to include: electrical stimulation, ultrasound, moist heat, and ice.     3. The patient complains of pain to the right shoulder.   LTG 3: 12 weeks:  The patient will report a pain rating of 1 /10 or better in order to improve sleep quality and tolerance to performance of activities of daily living.    STATUS:  New   STG 3a: 4 weeks:  The patient will report a pain rating of 3 /10 or better.     STATUS:  New   TREATMENT: Manual therapy, therapeutic exercise, home exercise instruction, and modalities as needed to include: electrical stimulation, ultrasound, moist heat, and ice.    4. Carrying, Moving, and Handling Objects Functional Limitation     LTG 4: 12 weeks:  The patient will demonstrate 20 % limitation by achieving a score of on the QuickDASH.    STATUS:  New   STG 4a: 4 weeks:  The patient will demonstrate 35 % limitation by achieving a score of on the QuickDASH.      STATUS:  New   TREATMENT:  Manual therapy, therapeutic exercise, home exercise instruction, and modalities as needed to include: moist heat, electrical stimulation, and ultrasound.    4. The patient reports radicular symptoms in the right upper extremity.   LTG 4: 12 weeks:  The patient will report a decrease in radicular symptoms in the  right upper extremity by 75%.    STATUS:  New   STG 4a: 4 weeks:  The patient will report a decrease in radicular symptoms in the right upper extremity by 50%.    STATUS:  New   TREATMENT: Manual therapy, therapeutic exercise, home exercise instruction, cervical traction, and modalities as needed to include: moist heat, electrical stimulation, and ultrasound.        Plan  Therapy options: will be seen for skilled therapy services  Planned modality interventions: cryotherapy, dry needling, thermotherapy (hydrocollator packs) and traction  Planned therapy interventions: manual therapy, flexibility, functional ROM exercises, home exercise program, therapeutic activities, stretching, strengthening, soft tissue mobilization, postural training, neuromuscular re-education, ADL retraining and body mechanics training  Frequency: 1x week  Duration in weeks: 12  Treatment plan discussed with: patient  Plan details: Cont with reemphasize thoracic and low back posturing to stand more erect. Cont to educate pt on TOS.  Stretches and manual as needed.          Visit Diagnoses:    ICD-10-CM ICD-9-CM   1. TOS (thoracic outlet syndrome)  G54.0 353.0   2. Pain of right upper extremity  M79.601 729.5   3. Thoracic outlet syndrome  G54.0 353.0   4. 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 Paresthesias in right hand  R20.2 782.0   6. Weakness of shoulder  R29.898 719.61   7. Arthralgia of right acromioclavicular joint  M25.511 719.41   8. Right shoulder pain, unspecified chronicity  M25.511 719.41       Progress per Plan of Care    Timed:    Therapeutic Exercise:    10     mins  98055;  Manual Therapy:    17     mins  12881;     Neuromuscular Herbie:       mins  47315;    Therapeutic Activity:          mins  13268;     Gait Training:           mins  98236;     Ultrasound:          mins  05170;      Untimed:  Electrical Stimulation:         mins  27536 ( );  Mechanical Traction:         mins  96414;     Timed  Treatment:   27   mins   Total Treatment:     34   mins      Jeanine Ty PTA  Physical Therapist Assistant

## 2022-04-14 ENCOUNTER — TELEPHONE (OUTPATIENT)
Dept: PAIN MEDICINE | Facility: CLINIC | Age: 37
End: 2022-04-14

## 2022-04-20 ENCOUNTER — TREATMENT (OUTPATIENT)
Dept: PHYSICAL THERAPY | Facility: CLINIC | Age: 37
End: 2022-04-20

## 2022-04-20 DIAGNOSIS — M25.511 ARTHRALGIA OF RIGHT ACROMIOCLAVICULAR JOINT: ICD-10-CM

## 2022-04-20 DIAGNOSIS — G54.0 TOS (THORACIC OUTLET SYNDROME): Primary | ICD-10-CM

## 2022-04-20 DIAGNOSIS — M25.511 RIGHT SHOULDER PAIN, UNSPECIFIED CHRONICITY: ICD-10-CM

## 2022-04-20 DIAGNOSIS — R29.898 WEAKNESS OF SHOULDER: ICD-10-CM

## 2022-04-20 DIAGNOSIS — R20.2 PARESTHESIAS IN RIGHT HAND: ICD-10-CM

## 2022-04-20 DIAGNOSIS — M89.511 OSTEOLYSIS OF ACROMIAL END OF RIGHT CLAVICLE: ICD-10-CM

## 2022-04-20 DIAGNOSIS — M79.601 PAIN OF RIGHT UPPER EXTREMITY: ICD-10-CM

## 2022-04-20 DIAGNOSIS — G54.0 THORACIC OUTLET SYNDROME: ICD-10-CM

## 2022-04-20 PROCEDURE — 97112 NEUROMUSCULAR REEDUCATION: CPT | Performed by: PHYSICAL THERAPIST

## 2022-04-20 PROCEDURE — 97110 THERAPEUTIC EXERCISES: CPT | Performed by: PHYSICAL THERAPIST

## 2022-04-20 PROCEDURE — 97530 THERAPEUTIC ACTIVITIES: CPT | Performed by: PHYSICAL THERAPIST

## 2022-04-20 PROCEDURE — 97140 MANUAL THERAPY 1/> REGIONS: CPT | Performed by: PHYSICAL THERAPIST

## 2022-04-20 NOTE — PROGRESS NOTES
Physical Therapy Daily Treatment Note      Patient: Juan M Lynch   : 1985  Referring practitioner: Manan Gasca III, MD  Date of Initial Visit: Type: THERAPY  Noted: 2022  Today's Date: 2022  Patient seen for 3 sessions           Subjective Evaluation    History of Present Illness    Subjective comment: 5/10 at todays tx visit       Objective          Ambulation     Comments   Rib on the L feels elevated to palpation. Pulse in R wrist becomes faded with extension and rotation. Clicking and audible crackling noise heard in SHLD with exercises.       See Exercise, Manual, and Modality Logs for complete treatment.       Assessment & Plan     Assessment  Impairments: abnormal muscle firing, abnormal or restricted ROM, activity intolerance, impaired physical strength and pain with function  Functional Limitations: carrying objects, lifting, sleeping, pulling, pushing, uncomfortable because of pain, reaching behind back, reaching overhead and unable to perform repetitive tasks  Assessment details: Cues for erect positioning was still required at this visit.  Pt radial pulse present in neutral but when arm extended and rotated the pulse faded.  Pt was concerned that his issue was SHLD related and this was to explain TOS to him.  Therapist noted poor movement patterns on the R UE as compared to the L with straight arm pull downs and horizontal ABD. Pt has greater difficulties locking elbow straight with the resistive movements. Pt educated on importance of correct posture.      Goals  Plan Goals: SHOULDER  PROBLEMS:     1. The patient has limited ROM of the right shoulder.    LTG 1: 12 weeks:  The patient will demonstrate 160 degrees of right shoulder flexion, 150 degrees of shoulder abduction, 70 degrees of shoulder external rotation, and 70 degrees of shoulder internal rotation to allow the patient to reach into upper kitchen cabinets and manipulate clothing behind the back with greater  ease.    STATUS:  Progressing   STG 1a: 4 weeks:  The patient will demonstrate 155 degrees of right shoulder flexion, 120 degrees of shoulder abduction, 50 degrees of shoulder external rotation, and 50 degrees of shoulder internal rotation.    STATUS:  Progressing   TREATMENT: Manual therapy, therapeutic exercise, home exercise instruction, and modalities as needed to include: electrical stimulation, ultrasound, moist heat, and ice.    2. The patient has limited strength of the right shoulder.   LTG 2: 12 weeks:  The patient will demonstrate 4 /5 strength for right shoulder flexion, abduction, external rotation, and internal rotation in order to demonstrate improved shoulder stability.    STATUS:  Progressing   STG 2a: 4 weeks:  The patient will demonstrate 3+ /5 strength for right shoulder flexion, abduction, external rotation, and internal rotation.    STATUS:  Progressing   STG2b:  4 weeks:  The patient will be independent with home exercises.     STATUS:  Progressing   TREATMENT: Manual therapy, therapeutic exercise, home exercise instruction, and modalities as needed to include: electrical stimulation, ultrasound, moist heat, and ice.     3. The patient complains of pain to the right shoulder.   LTG 3: 12 weeks:  The patient will report a pain rating of 1 /10 or better in order to improve sleep quality and tolerance to performance of activities of daily living.    STATUS:  Progressing   STG 3a: 4 weeks:  The patient will report a pain rating of 3 /10 or better.     STATUS:  Progressing   TREATMENT: Manual therapy, therapeutic exercise, home exercise instruction, and modalities as needed to include: electrical stimulation, ultrasound, moist heat, and ice.    4. Carrying, Moving, and Handling Objects Functional Limitation     LTG 4: 12 weeks:  The patient will demonstrate 20 % limitation by achieving a score of on the QuickDASH.    STATUS:  Progressing   STG 4a: 4 weeks:  The patient will demonstrate 35 %  limitation by achieving a score of on the QuickDASH.      STATUS:  Progressing   TREATMENT:  Manual therapy, therapeutic exercise, home exercise instruction, and modalities as needed to include: moist heat, electrical stimulation, and ultrasound.    4. The patient reports radicular symptoms in the right upper extremity.   LTG 4: 12 weeks:  The patient will report a decrease in radicular symptoms in the right upper extremity by 75%.    STATUS:  Progressing   STG 4a: 4 weeks:  The patient will report a decrease in radicular symptoms in the right upper extremity by 50%.    STATUS:  Progressing   TREATMENT: Manual therapy, therapeutic exercise, home exercise instruction, cervical traction, and modalities as needed to include: moist heat, electrical stimulation, and ultrasound.        Plan  Therapy options: will be seen for skilled therapy services  Planned modality interventions: cryotherapy, dry needling, thermotherapy (hydrocollator packs) and traction  Planned therapy interventions: manual therapy, flexibility, functional ROM exercises, home exercise program, therapeutic activities, stretching, strengthening, soft tissue mobilization, postural training, neuromuscular re-education, ADL retraining and body mechanics training  Frequency: 1x week  Duration in weeks: 12  Treatment plan discussed with: patient  Plan details: Cont with reemphasize thoracic and low back posturing to stand more erect. Cont to educate pt on TOS.  Stretches and manual as needed.          Visit Diagnoses:    ICD-10-CM ICD-9-CM   1. TOS (thoracic outlet syndrome)  G54.0 353.0   2. Pain of right upper extremity  M79.601 729.5   3. Thoracic outlet syndrome  G54.0 353.0   4. 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 Right shoulder pain, unspecified chronicity  M25.511 719.41   6. Arthralgia of right acromioclavicular joint  M25.511 719.41   7. Weakness of shoulder  R29.898 719.61   8. Paresthesias in right hand  R20.2 782.0        Progress per Plan of Care    Timed:    Therapeutic Exercise:    12     mins  93553;  Manual Therapy:    10     mins  71332;     Neuromuscular Herbie:   8    mins  36862;    Therapeutic Activity:     10     mins  47786;     Gait Training:           mins  40863;     Ultrasound:          mins  44462;      Untimed:  Electrical Stimulation:         mins  70084 ( );  Mechanical Traction:         mins  18512;     Timed Treatment:   40   mins   Total Treatment:     43   mins      Jeanine Ty PTA  Physical Therapist Assistant

## 2022-04-21 ENCOUNTER — OFFICE VISIT (OUTPATIENT)
Dept: OTOLARYNGOLOGY | Facility: CLINIC | Age: 37
End: 2022-04-21

## 2022-04-21 VITALS — WEIGHT: 147 LBS | BODY MASS INDEX: 23.63 KG/M2 | TEMPERATURE: 97.4 F | HEIGHT: 66 IN

## 2022-04-21 DIAGNOSIS — J33.9 NASAL POLYPS: ICD-10-CM

## 2022-04-21 DIAGNOSIS — E04.1 THYROID NODULE: Primary | ICD-10-CM

## 2022-04-21 DIAGNOSIS — D49.7 FOLLICULAR NEOPLASM OF THYROID: ICD-10-CM

## 2022-04-21 DIAGNOSIS — J34.89 NASAL OBSTRUCTION: ICD-10-CM

## 2022-04-21 PROCEDURE — 99204 OFFICE O/P NEW MOD 45 MIN: CPT | Performed by: OTOLARYNGOLOGY

## 2022-04-21 PROCEDURE — 31575 DIAGNOSTIC LARYNGOSCOPY: CPT | Performed by: OTOLARYNGOLOGY

## 2022-04-21 NOTE — PROGRESS NOTES
Patient Name: Juan M Lynch   Visit Date: 04/21/2022   Patient ID: [id number]  Provider: Reji Beebe MD    Sex: male  Location: [facility]   YOB: 1985  Location Address: [address] [place] [address], [address] [place] [address],?[place]?[id number]    Primary Care Provider Zoila Mckinney APRN  Location Phone: [phone]    Referring Provider: Manan Gasca III, MD        Chief Complaint  thyroid nodule (New patient )    Subjective    History of Present Illness  Juan M Lynch is a 36 y.o. male who presents to [facility] today as a consult from Manan Gasca III, MD.    He presents the clinic today for evaluation of thyroid nodule which was biopsied and determined to be suspicious for follicular neoplasm.  The nodule is identified incidentally on MRI and an ultrasound was performed.  I reviewed ultrasound results today along with the patient which reveals a hypoechoic posterior right-sided thyroid nodule measuring about 2.2 x 1.3 cm.  This was rated as a T RADS 4 lesion and biopsy was recommended and obtained.  Patient denies any family history of thyroid cancer and has never had radiation exposure to the neck.    He is also had some nasal obstruction symptoms, specifically on the right side.  He has had lifelong issues with allergic rhinitis.    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   • Thyroid cancer (HCC)    • Thyroid nodule        Past Surgical History:   Procedure Laterality Date   • ELBOW PROCEDURE     • VASECTOMY           Current Outpatient Medications:   •  ibuprofen (ADVIL,MOTRIN) 200 MG tablet, Take 200 mg by mouth Every 6 (Six) Hours As Needed for Mild Pain ., Disp: , Rfl:   •  gabapentin (NEURONTIN) 300 MG capsule, Take 1 capsule by mouth Every Night., Disp: 30 capsule, Rfl: 1     Allergies   Allergen Reactions   • Guaifenesin Hives       Family History   Problem Relation Age of Onset   • No Known  "Problems Mother    • No Known Problems Father         Social History     Social History Narrative   • Not on file       Objective     Vital Signs:   Temp 97.4 °F (36.3 °C) (Temporal)   Ht 167.6 cm (66\")   Wt 66.7 kg (147 lb)   BMI 23.73 kg/m²       Physical Exam    Flexible laryngoscopy    Date/Time: 4/21/2022 4:25 PM  Performed by: Reji Beebe MD  Authorized by: Reji Beebe MD     Procedure details:     Indications: visualization of the upper airway and evaluation of larynx      Medication:  Afrin    Instrument: flexible fiberoptic laryngoscope      Scope location: bilateral nare    Comments:      The nasal cavity reveals polyps on both sides significantly worse on the right with partial obstruction of the airway and no evidence of purulence.  Laryngeal exam was normal with normal vocal fold mobility, no lesions.        Constitutional   Appearance  · : well developed, well-nourished, alert and in no acute distress, voice clear and strong    Head  Inspection  · : no deformities or lesions  Face  Inspection  · : No facial lesions; House-Brackmann I/VI bilaterally  Palpation  · : No TMJ crepitus nor  muscle tenderness bilaterally    Eyes  Vision  Visual Fields  · : Extraocular movements are intact. No spontaneous or gaze-induced nystagmus.  Conjunctivae  · : clear  Sclerae  · : clear  Pupils and Irises  · : pupils equal, round, and reactive to light.     Ears, Nose, Mouth and Throat    Ears    External Ears  · : appearance within normal limits, no lesions present  Otoscopic Examination  · : Tympanic membrane appearance within normal limits bilaterally without perforations, well-aerated middle ears  Hearing  · : intact to conversational voice both ears  Tunning fork testing:     :    Nose    External Nose  · : appearance normal  Intranasal Exam  · : mucosa within normal limits, vestibules normal, polyps present on both the right and left sides, worse on the right, causing partial " obstruction but no evidence of purulence, septum midline, sinuses non tender to percussion  Oral Cavity    Oral Mucosa  · : oral mucosa normal without pallor or cyanosis  Lips  · : lip appearance normal  Teeth  · : normal dentition for age  Gums  · : gums pink, non-swollen, no bleeding present  Tongue  · : tongue appearance normal; normal mobility  Palate  · : hard palate normal, soft palate appearance normal with symmetric mobility    Throat    Oropharynx  · : no inflammation or lesions present, tonsils within normal limits  Hypopharynx  · : appearance within normal limits, superior epiglottis within normal limits  Larynx  · : appearance within normal limits, vocal cords within normal limits, no lesions present    Neck  Inspection/Palpation  · : normal appearance, no masses or tenderness, trachea midline; thyroid size normal, nontender, nodules soft, unable to palpate, no lymphadenopathy    Respiratory  Respiratory Effort  · : breathing unlabored  Inspection of Chest  · : normal appearance, no retractions    Cardiovascular  Heart  · : regular rate and rhythm    Lymphatic  Neck  · : no lymphadenopathy present  Supraclavicular Nodes  · : no lymphadenopathy present  Preauricular Nodes  · : no lymphadenopathy present    Skin and Subcutaneous Tissue  General Inspection  · : Regarding face and neck - there are no rashes present, no lesions present, and no areas of discoloration    Neurologic  Cranial Nerves  · : cranial nerves II-XII are grossly intact bilaterally  Gait and Station  · : normal gait, able to stand without diffculty    Psychiatric  Judgement and Insight  · : judgment and insight intact  Mood and Affect  · : mood normal, affect appropriate          Assessment and Plan    Diagnoses and all orders for this visit:    1. Thyroid nodule (Primary)  -     Flexible laryngoscopy  -     COVID PRE-OP / PRE-PROCEDURE SCREENING ORDER (NO ISOLATION) - Swab, Nasopharynx; Future  -     Case Request; Standing  -     Case  Request    2. Nasal polyps  -     Flexible laryngoscopy  -     COVID PRE-OP / PRE-PROCEDURE SCREENING ORDER (NO ISOLATION) - Swab, Nasopharynx; Future  -     Case Request; Standing  -     Case Request    3. Nasal obstruction  -     Flexible laryngoscopy  -     COVID PRE-OP / PRE-PROCEDURE SCREENING ORDER (NO ISOLATION) - Swab, Nasopharynx; Future  -     Case Request; Standing  -     Case Request    4. Follicular neoplasm of thyroid  -     Flexible laryngoscopy  -     COVID PRE-OP / PRE-PROCEDURE SCREENING ORDER (NO ISOLATION) - Swab, Nasopharynx; Future  -     Case Request; Standing  -     Case Request    Other orders  -     Follow Anesthesia Guidelines / Protocol; Future    Examination today revealed nasal polyps which were confirmed on nasal endoscopy.  The nodule in the thyroid is difficult to palpate due to its posterior location, but based on ultrasound and biopsy results I did recommend right thyroid lobe and isthmus excision for definitive diagnosis.  We can also take care of the polyps as it does cause the patient symptoms and he would like them addressed.  I discussed the procedure with him at length, including the possible complications and alternatives.  He notes that he understands, and would like to proceed.  He did have some issues with preoperative nasal swabbing for COVID, and I will have him discuss this with the hospital administration as it is currently a requirement prior to general surgery.  I will make arrangements to schedule this for him in the near future.    Follow Up   No follow-ups on file.  Patient was given instructions and counseling regarding his condition or for health maintenance advice. Please see specific information pulled into the AVS if appropriate.

## 2022-04-27 ENCOUNTER — TELEPHONE (OUTPATIENT)
Dept: SURGERY | Facility: CLINIC | Age: 37
End: 2022-04-27

## 2022-04-27 ENCOUNTER — TREATMENT (OUTPATIENT)
Dept: PHYSICAL THERAPY | Facility: CLINIC | Age: 37
End: 2022-04-27

## 2022-04-27 DIAGNOSIS — M89.511 OSTEOLYSIS OF ACROMIAL END OF RIGHT CLAVICLE: ICD-10-CM

## 2022-04-27 DIAGNOSIS — G54.0 TOS (THORACIC OUTLET SYNDROME): Primary | ICD-10-CM

## 2022-04-27 DIAGNOSIS — M79.601 PAIN OF RIGHT UPPER EXTREMITY: ICD-10-CM

## 2022-04-27 DIAGNOSIS — M25.511 ARTHRALGIA OF RIGHT ACROMIOCLAVICULAR JOINT: ICD-10-CM

## 2022-04-27 DIAGNOSIS — R29.898 WEAKNESS OF SHOULDER: ICD-10-CM

## 2022-04-27 DIAGNOSIS — M25.511 RIGHT SHOULDER PAIN, UNSPECIFIED CHRONICITY: ICD-10-CM

## 2022-04-27 DIAGNOSIS — R20.2 PARESTHESIAS IN RIGHT HAND: ICD-10-CM

## 2022-04-27 DIAGNOSIS — G54.0 THORACIC OUTLET SYNDROME: ICD-10-CM

## 2022-04-27 PROCEDURE — 97140 MANUAL THERAPY 1/> REGIONS: CPT | Performed by: PHYSICAL THERAPIST

## 2022-04-27 NOTE — PROGRESS NOTES
Physical Therapy Daily Treatment Note      Patient: Juan M Lynch   : 1985  Referring practitioner: Manan Gasca III, MD  Date of Initial Visit: Type: THERAPY  Noted: 2022  Today's Date: 2022  Patient seen for 4 sessions           Subjective Evaluation    History of Present Illness    Subjective comment: 0/10 Pt states he had a nerve block 2 days ago and just got his feeling back int he UE yesterday.       Objective          Ambulation     Comments   Numerous regions in the upper thoracic and lower cervical spine are rotated and restricted.       See Exercise, Manual, and Modality Logs for complete treatment.       Assessment & Plan     Assessment  Impairments: abnormal muscle firing, abnormal or restricted ROM, activity intolerance, impaired physical strength and pain with function  Functional Limitations: carrying objects, lifting, sleeping, pulling, pushing, uncomfortable because of pain, reaching behind back, reaching overhead and unable to perform repetitive tasks  Assessment details: Tried to phone MD who gave block to speak about what we can do in therapy but was unable to get a hold of him. MD office did call back after appointment and stated he could cont with tx as needed. Pt cont to have rotations throughout upper thoracic and lower cervical region.  MOBs in this area did help loosen up the spinal regions.      Goals  Plan Goals: SHOULDER  PROBLEMS:     1. The patient has limited ROM of the right shoulder.    LTG 1: 12 weeks:  The patient will demonstrate 160 degrees of right shoulder flexion, 150 degrees of shoulder abduction, 70 degrees of shoulder external rotation, and 70 degrees of shoulder internal rotation to allow the patient to reach into upper kitchen cabinets and manipulate clothing behind the back with greater ease.    STATUS:  Progressing   STG 1a: 4 weeks:  The patient will demonstrate 155 degrees of right shoulder flexion, 120 degrees of shoulder abduction, 50 degrees  of shoulder external rotation, and 50 degrees of shoulder internal rotation.    STATUS:  Progressing   TREATMENT: Manual therapy, therapeutic exercise, home exercise instruction, and modalities as needed to include: electrical stimulation, ultrasound, moist heat, and ice.    2. The patient has limited strength of the right shoulder.   LTG 2: 12 weeks:  The patient will demonstrate 4 /5 strength for right shoulder flexion, abduction, external rotation, and internal rotation in order to demonstrate improved shoulder stability.    STATUS:  Progressing   STG 2a: 4 weeks:  The patient will demonstrate 3+ /5 strength for right shoulder flexion, abduction, external rotation, and internal rotation.    STATUS:  Progressing   STG2b:  4 weeks:  The patient will be independent with home exercises.     STATUS:  Progressing   TREATMENT: Manual therapy, therapeutic exercise, home exercise instruction, and modalities as needed to include: electrical stimulation, ultrasound, moist heat, and ice.     3. The patient complains of pain to the right shoulder.   LTG 3: 12 weeks:  The patient will report a pain rating of 1 /10 or better in order to improve sleep quality and tolerance to performance of activities of daily living.    STATUS:  Progressing   STG 3a: 4 weeks:  The patient will report a pain rating of 3 /10 or better.     STATUS:  Progressing   TREATMENT: Manual therapy, therapeutic exercise, home exercise instruction, and modalities as needed to include: electrical stimulation, ultrasound, moist heat, and ice.    4. Carrying, Moving, and Handling Objects Functional Limitation     LTG 4: 12 weeks:  The patient will demonstrate 20 % limitation by achieving a score of on the QuickDASH.    STATUS:  Progressing   STG 4a: 4 weeks:  The patient will demonstrate 35 % limitation by achieving a score of on the QuickDASH.      STATUS:  Progressing   TREATMENT:  Manual therapy, therapeutic exercise, home exercise instruction, and  modalities as needed to include: moist heat, electrical stimulation, and ultrasound.    4. The patient reports radicular symptoms in the right upper extremity.   LTG 4: 12 weeks:  The patient will report a decrease in radicular symptoms in the right upper extremity by 75%.    STATUS:  Progressing   STG 4a: 4 weeks:  The patient will report a decrease in radicular symptoms in the right upper extremity by 50%.    STATUS:  Progressing   TREATMENT: Manual therapy, therapeutic exercise, home exercise instruction, cervical traction, and modalities as needed to include: moist heat, electrical stimulation, and ultrasound.        Plan  Therapy options: will be seen for skilled therapy services  Planned modality interventions: cryotherapy, dry needling, thermotherapy (hydrocollator packs) and traction  Planned therapy interventions: manual therapy, flexibility, functional ROM exercises, home exercise program, therapeutic activities, stretching, strengthening, soft tissue mobilization, postural training, neuromuscular re-education, ADL retraining and body mechanics training  Frequency: 1x week  Duration in weeks: 12  Treatment plan discussed with: patient  Plan details: Cont with thoracic and cervical posturing and MOBs to help loosen the tissue and regain better posture. Cont with strengthening to help the thoracic spine regain better muscle balancing.          Visit Diagnoses:    ICD-10-CM ICD-9-CM   1. TOS (thoracic outlet syndrome)  G54.0 353.0   2. Pain of right upper extremity  M79.601 729.5   3. Thoracic outlet syndrome  G54.0 353.0   4. 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 Right shoulder pain, unspecified chronicity  M25.511 719.41   6. Arthralgia of right acromioclavicular joint  M25.511 719.41   7. Weakness of shoulder  R29.898 719.61   8. Paresthesias in right hand  R20.2 782.0       Progress per Plan of Care    Timed:    Therapeutic Exercise:         mins  99681;  Manual Therapy:    25      mins  80325;     Neuromuscular Herbie:       mins  75899;    Therapeutic Activity:          mins  40660;     Gait Training:           mins  70779;     Ultrasound:          mins  30851;      Untimed:  Electrical Stimulation:         mins  17828 ( );  Mechanical Traction:         mins  94056;     Timed Treatment:   25   mins   Total Treatment:     30   mins      Jeanine Ty PTA  Physical Therapist Assistant

## 2022-04-27 NOTE — TELEPHONE ENCOUNTER
Caller: CANDICE JONES    Relationship: SELF    Best call back number: [phone]    What is the best time to reach you: ANY - ASAP    Who are you requesting to speak with (clinical staff, provider,  specific staff member): ANY    What was the call regarding: PT HAD VISIT WITH PHYSICAL THERAPY TODAY - WHILE DOING SOME EXERCISES THE PHYSICAL THERAPIST NOTICED VERTE[facility]RAE OUT OF PLACE AND SUGGESTED A CHIROPRACTOR OR MASSAGE THERAPIST - PT WAS WONDERING SINCE HE HAD HIS BLOCK YESTERDAY IF IT WOULD BE OKAY TO GO TO EITHER OF THOSE FOR THIS ISSUE? AND IF HE NEEDS TO WAIT ANY AMOUNT OF TIME AFTER HIS BLOCK.    Do you require a callback: YES    **ATTEMPTED TO WARM TRANSFER BUT WAS UNSUCCESSFUL**

## 2022-05-03 ENCOUNTER — OFFICE VISIT (OUTPATIENT)
Dept: SURGERY | Facility: CLINIC | Age: 37
End: 2022-05-03

## 2022-05-03 ENCOUNTER — TELEPHONE (OUTPATIENT)
Dept: SURGERY | Facility: CLINIC | Age: 37
End: 2022-05-03

## 2022-05-03 VITALS
BODY MASS INDEX: 23.33 KG/M2 | TEMPERATURE: 97.5 F | HEART RATE: 59 BPM | WEIGHT: 145.2 LBS | DIASTOLIC BLOOD PRESSURE: 78 MMHG | HEIGHT: 66 IN | SYSTOLIC BLOOD PRESSURE: 128 MMHG | OXYGEN SATURATION: 98 %

## 2022-05-03 DIAGNOSIS — G54.0 TOS (THORACIC OUTLET SYNDROME): Primary | ICD-10-CM

## 2022-05-03 PROCEDURE — 99212 OFFICE O/P EST SF 10 MIN: CPT | Performed by: THORACIC SURGERY (CARDIOTHORACIC VASCULAR SURGERY)

## 2022-05-03 NOTE — TELEPHONE ENCOUNTER
Provider: DR. LAGUERRE  Phone Number: [phone]    Reason for Call:MR. JONES NEEDS A NOTE TO BE FAXED TO HIS  OFFICE, IRENE STATON STATING THAT THE PATIENT WAS SEEN TODAY FOR TOS THAT IS DIRECTLY RELATED TO A WORK INJURY.  FAX #[phone]    When was the patient last seen: 5.3.22

## 2022-05-04 ENCOUNTER — TREATMENT (OUTPATIENT)
Dept: PHYSICAL THERAPY | Facility: CLINIC | Age: 37
End: 2022-05-04

## 2022-05-04 DIAGNOSIS — R29.898 WEAKNESS OF SHOULDER: ICD-10-CM

## 2022-05-04 DIAGNOSIS — M25.511 RIGHT SHOULDER PAIN, UNSPECIFIED CHRONICITY: ICD-10-CM

## 2022-05-04 DIAGNOSIS — G54.0 TOS (THORACIC OUTLET SYNDROME): Primary | ICD-10-CM

## 2022-05-04 DIAGNOSIS — M79.601 PAIN OF RIGHT UPPER EXTREMITY: ICD-10-CM

## 2022-05-04 PROCEDURE — 97110 THERAPEUTIC EXERCISES: CPT | Performed by: PHYSICAL THERAPIST

## 2022-05-04 PROCEDURE — 97530 THERAPEUTIC ACTIVITIES: CPT | Performed by: PHYSICAL THERAPIST

## 2022-05-04 PROCEDURE — 97140 MANUAL THERAPY 1/> REGIONS: CPT | Performed by: PHYSICAL THERAPIST

## 2022-05-04 NOTE — PROGRESS NOTES
"Re-Assessment / Re-Certification        Patient: Juan M Lynch   : 1985  Diagnosis/ICD-10 Code:  TOS (thoracic outlet syndrome) [G54.0]  Referring practitioner: Manan Gasca III, MD  Date of Initial Visit: Type: THERAPY  Noted: 2022  Today's Date: 2022  Patient seen for 5 sessions      Subjective:     Visit Diagnoses:    ICD-10-CM ICD-9-CM   1. TOS (thoracic outlet syndrome)  G54.0 353.0   2. Pain of right upper extremity  M79.601 729.5   3. Right shoulder pain, unspecified chronicity  M25.511 719.41   4. Weakness of shoulder  R29.898 719.61       Clinical Progress: IMPROVED  Home Program Compliance: YES  Treatment has included: therapeutic exercise, neuromuscular re-education, manual therapy and therapeutic activity    Subjective Evaluation    History of Present Illness    Subjective comment: Pt states that he saw Dr. Gasca yesterday for a follow up.  He states the doctor wants him to get another injection prior his surgery (May 16 - thyroidectomy and polpys removed from his nose).  The first injection was last Monday, since then he hasn't had any numbness/tingling down the arms or hands.  He has no pain, unless he has to raise the R arm up - feels more cracking/roughness than pain.  He has been working out more, nothing overhead or no benching.        Objective          Strength/Myotome Testing     Right Shoulder     Planes of Motion   Flexion: 4-   Extension: 4   Abduction: 3+   External rotation at 0°: 4-   Internal rotation at 0°: 4-     Additional Strength Details  Long lever arm increases pain and decreases strength immediately     General Comments     Shoulder Comments   TTP over R AC joint, post, lat acromion, bicipital groove    ROM:   R shoulder AROM WFL in all directions, but he does have  \"crunching/roughness\" with UE elevation starting around 90 with abduction and 120 deg with flexion.  IR had tightness and some pain at end ranges. ER WFL.                Assessment & Plan "     Assessment  Impairments: abnormal muscle firing, abnormal or restricted ROM, activity intolerance, impaired physical strength and pain with function  Functional Limitations: carrying objects, lifting, sleeping, pulling, pushing, uncomfortable because of pain, reaching behind back, reaching overhead and unable to perform repetitive tasks  Assessment details: Pt continues to not have any radicular symptoms down the RUE since having the injection last week in the R arm.  He is going to have another one per MD prior to having his surgery on the 16th.  He is to hold from therapy for at least 2 weeks following the surgery pt noted.      He is progressing towards his goals.  Radicular symptoms have improved.  ROM is WFL all directions, still getting increased cracking/popping feeling in the R GH and AC joints with over 90 deg motions.  Strength is limited, no long lever resistance is tolerated per pt.    He is going to have his thyroidectomy on May 16, wait two weeks, then return back to therapy.  PT told patient to check with the surgeon about his return to PT date in case two weeks is too soon.  Pt agreed, will call PT office back after confirming when he can resume PT.       Goals  Plan Goals: SHOULDER  PROBLEMS:     1. The patient has limited ROM of the right shoulder.    LTG 1: 12 weeks:  The patient will demonstrate 160 degrees of right shoulder flexion, 150 degrees of shoulder abduction, 70 degrees of shoulder external rotation, and 70 degrees of shoulder internal rotation to allow the patient to reach into upper kitchen cabinets and manipulate clothing behind the back with greater ease.    STATUS:  Met   STG 1a: 4 weeks:  The patient will demonstrate 155 degrees of right shoulder flexion, 120 degrees of shoulder abduction, 50 degrees of shoulder external rotation, and 50 degrees of shoulder internal rotation.    STATUS:  Met   TREATMENT: Manual therapy, therapeutic exercise, home exercise instruction, and  modalities as needed to include: electrical stimulation, ultrasound, moist heat, and ice.    2. The patient has limited strength of the right shoulder.   LTG 2: 12 weeks:  The patient will demonstrate 4 /5 strength for right shoulder flexion, abduction, external rotation, and internal rotation in order to demonstrate improved shoulder stability.    STATUS:  Progressing   STG 2a: 4 weeks:  The patient will demonstrate 3+ /5 strength for right shoulder flexion, abduction, external rotation, and internal rotation.    STATUS:  Met   STG2b:  4 weeks:  The patient will be independent with home exercises.     STATUS:  Progressing   TREATMENT: Manual therapy, therapeutic exercise, home exercise instruction, and modalities as needed to include: electrical stimulation, ultrasound, moist heat, and ice.     3. The patient complains of pain to the right shoulder.   LTG 3: 12 weeks:  The patient will report a pain rating of 1 /10 or better in order to improve sleep quality and tolerance to performance of activities of daily living.    STATUS:  Met   STG 3a: 4 weeks:  The patient will report a pain rating of 3 /10 or better.     STATUS:  Met   TREATMENT: Manual therapy, therapeutic exercise, home exercise instruction, and modalities as needed to include: electrical stimulation, ultrasound, moist heat, and ice.    4. Carrying, Moving, and Handling Objects Functional Limitation     LTG 4: 12 weeks:  The patient will demonstrate 20 % limitation by achieving a score of on the QuickDASH.    STATUS:  Met   STG 4a: 4 weeks:  The patient will demonstrate 35 % limitation by achieving a score of on the QuickDASH.      STATUS:  Met  TREATMENT:  Manual therapy, therapeutic exercise, home exercise instruction, and modalities as needed to include: moist heat, electrical stimulation, and ultrasound.    4. The patient reports radicular symptoms in the right upper extremity.   LTG 4: 12 weeks:  The patient will report a decrease in radicular  symptoms in the right upper extremity by 75%.    STATUS:  Met   STG 4a: 4 weeks:  The patient will report a decrease in radicular symptoms in the right upper extremity by 50%.    STATUS:  Met   TREATMENT: Manual therapy, therapeutic exercise, home exercise instruction, cervical traction, and modalities as needed to include: moist heat, electrical stimulation, and ultrasound.        Plan  Therapy options: will be seen for skilled therapy services  Planned modality interventions: cryotherapy, dry needling, thermotherapy (hydrocollator packs) and traction  Planned therapy interventions: manual therapy, flexibility, functional ROM exercises, home exercise program, therapeutic activities, stretching, strengthening, soft tissue mobilization, postural training, neuromuscular re-education, ADL retraining and body mechanics training  Frequency: 1x week  Duration in weeks: 8  Treatment plan discussed with: patient  Plan details: Continue with scapular/thoracic mobility, improve further mobility with less popping, continue to decrease any radicular symptoms.    Reassessment after the thyroidectomy.    Pt will return early June if cleared by surgeon.         Progress toward previous goals: Partially Met        Recommendations: Continue as planned  Timeframe: 2 months  Prognosis to achieve goals: good    Timed:  Manual Therapy:    14     mins  95255;  Therapeutic Exercise:    13     mins  51205;     Neuromuscular Herbie:        mins  51943;    Therapeutic Activity:     10     mins  16649;     Gait Training:           mins  40073;     Ultrasound:          mins  41321;    Canalith Repos           ___  mins  32432      Untimed:  Electrical Stimulation:         mins  02031 ( );  Mechanical Traction:         mins  73250;   Dry Needling:                     mins self pay  Re-Eval:                           mins  30941         Timed Treatment:   37   mins   Total Treatment:     45   mins          PT SIGNATURE: Gin Power, PT,  DPT  [place] License: [id number]    Certification Period: 5/4/2022 thru 8/1/2022  I certify that the therapy services are furnished while this patient is under my care.  The services outlined above are required by this patient, and will be reviewed every 90 days.     PHYSICIAN: Manan Gasca III, MD  NPI: [id number]      PHYSICIAN PRINT NAME: ______________________________________________      PHYSICIAN SIGNATURE: ______________________________________________         DATE:________________________________        Please sign and return via fax to [phone].  Thank you, [facility].

## 2022-05-09 NOTE — PROGRESS NOTES
"Chief Complaint  Neck and arm pain    Subjective          Juan M Lynch presents to [facility] THORACIC SURGERY  History of Present Illness     Mr. Lynch was seen in the office today for further follow-up of his neck and right shoulder pain radiating into the right arm and hand associated with numbness and paresthesias.  Patient is scheduled to have thyroidectomy and removal of nasal polyps.  He has had 1 interscalene block which seemed to help.  He is due another block and would like to get that done prior to the surgery.  Symptoms remain about the same with no new change.    Objective   Vital Signs:  /78 (BP Location: Right arm, Patient Position: Sitting, Cuff Size: Adult)   Pulse 59   Temp 97.5 °F (36.4 °C) (Infrared)   Ht 167.6 cm (66\")   Wt 65.9 kg (145 lb 3.2 oz)   SpO2 98%   BMI 23.44 kg/m²     BMI is within normal parameters. No follow-up required.      Physical Exam     Constitutional:       Appearance: Normal appearance. He is well-developed.   HENT:      Head: Normocephalic.   Eyes:      General: Lids are normal.      Conjunctiva/sclera: Conjunctivae normal.      Pupils: Pupils are equal, round, and reactive to light.   Neck:      Thyroid: No thyroid mass or thyromegaly.      Vascular: No carotid bruit, hepatojugular reflux or JVD.      Trachea: Trachea normal.      Comments: Full range of motion without pain.  No tenderness in the right or left supraclavicular fossa.  No masses and no adenopathy.  Cardiovascular:      Rate and Rhythm: Normal rate and regular rhythm.  No extrasystoles are present.     Chest Wall: PMI is not displaced.      Pulses: Normal pulses.      Heart sounds: Normal heart sounds, S1 normal and S2 normal.      Comments: Equivocal Adson's maneuver on the right.  Negative Adson's maneuver on the left.  Pulmonary:      Effort: Pulmonary effort is normal.      Breath sounds: Normal breath sounds.   Abdominal:      General: Bowel sounds are normal. "      Palpations: Abdomen is soft. There is no mass.      Tenderness: There is no abdominal tenderness.      Hernia: No hernia is present.   Musculoskeletal:         General: Normal range of motion.      Cervical back: Normal range of motion and neck supple.      Comments: Normal strength in all muscle groups tested in both upper extremities.   is normal bilaterally.  Range of motion in the right shoulder is limited by pain.  Normal range of motion the left shoulder.  Very tender in the right deltopectoral groove.  No tenderness in the left deltopectoral groove.   Skin:     General: Skin is warm and dry.   Neurological:      Mental Status: He is alert and oriented to person, place, and time.      Cranial Nerves: No cranial nerve deficit.      Sensory: No sensory deficit.      Deep Tendon Reflexes: Reflexes are normal and symmetric.      Comments: Touch and fine motor function are intact bilaterally   Psychiatric:         Speech: Speech normal.         Behavior: Behavior normal.         Thought Content: Thought content normal.         Judgment: Judgment normal.  Patient was fully examined today.  I have found no significant changes since his initial exam.    Result Review :   The following data was reviewed by: Manan Gasca III, MD on 05/03/2022:    No studies for review today.         Assessment and Plan    Diagnoses and all orders for this visit:    1. TOS (thoracic outlet syndrome) (Primary)      Patient had some relief with the interscalene block.  This is a good indicator that he would benefit from first rib resection.  I believe the problem with his thyroid is more pressing and needs to be addressed.  He is scheduled for thyroidectomy later this month.  We will try to arrange for a interscalene block before that surgery.  He will return to see me in 6 weeks for reevaluation and to discuss first rib resection.       I spent 13 minutes caring for Juan M on this date of service. This time includes time  spent by me in the following activities:preparing for the visit, reviewing tests, performing a medically appropriate examination and/or evaluation , counseling and educating the patient/family/caregiver, ordering medications, tests, or procedures, referring and communicating with other health care professionals , documenting information in the medical record, independently interpreting results and communicating that information with the patient/family/caregiver and care coordination  Follow Up   Return in about 6 weeks (around 6/14/2022) for Recheck.  Patient was given instructions and counseling regarding his condition or for health maintenance advice. Please see specific information pulled into the AVS if appropriate.

## 2022-05-11 ENCOUNTER — CLINICAL SUPPORT (OUTPATIENT)
Dept: FAMILY MEDICINE CLINIC | Age: 37
End: 2022-05-11

## 2022-05-11 DIAGNOSIS — J33.9 NASAL POLYPS: ICD-10-CM

## 2022-05-11 DIAGNOSIS — E04.1 THYROID NODULE: ICD-10-CM

## 2022-05-11 DIAGNOSIS — J34.89 NASAL OBSTRUCTION: ICD-10-CM

## 2022-05-11 DIAGNOSIS — D49.7 FOLLICULAR NEOPLASM OF THYROID: ICD-10-CM

## 2022-05-11 PROCEDURE — U0004 COV-19 TEST NON-CDC HGH THRU: HCPCS | Performed by: OTOLARYNGOLOGY

## 2022-05-11 PROCEDURE — 99211 OFF/OP EST MAY X REQ PHY/QHP: CPT | Performed by: FAMILY MEDICINE

## 2022-05-12 LAB — SARS-COV-2 RNA PNL SPEC NAA+PROBE: NOT DETECTED

## 2022-05-12 RX ORDER — ACETAMINOPHEN, ASPIRIN AND CAFFEINE 250; 250; 65 MG/1; MG/1; MG/1
1 TABLET, FILM COATED ORAL EVERY 6 HOURS PRN
COMMUNITY
End: 2022-08-12

## 2022-05-16 ENCOUNTER — HOSPITAL ENCOUNTER (OUTPATIENT)
Facility: HOSPITAL | Age: 37
Setting detail: HOSPITAL OUTPATIENT SURGERY
Discharge: HOME | End: 2022-05-16
Attending: OTOLARYNGOLOGY | Admitting: OTOLARYNGOLOGY

## 2022-05-16 ENCOUNTER — ANESTHESIA EVENT (OUTPATIENT)
Dept: PERIOP | Facility: HOSPITAL | Age: 37
End: 2022-05-16

## 2022-05-16 ENCOUNTER — ANESTHESIA (OUTPATIENT)
Dept: PERIOP | Facility: HOSPITAL | Age: 37
End: 2022-05-16

## 2022-05-16 VITALS
HEART RATE: 64 BPM | BODY MASS INDEX: 22.82 KG/M2 | DIASTOLIC BLOOD PRESSURE: 80 MMHG | RESPIRATION RATE: 11 BRPM | HEIGHT: 66 IN | OXYGEN SATURATION: 93 % | SYSTOLIC BLOOD PRESSURE: 111 MMHG | TEMPERATURE: 97 F | WEIGHT: 141.98 LBS

## 2022-05-16 DIAGNOSIS — D49.7 FOLLICULAR NEOPLASM OF THYROID: ICD-10-CM

## 2022-05-16 DIAGNOSIS — J34.89 NASAL OBSTRUCTION: ICD-10-CM

## 2022-05-16 DIAGNOSIS — J33.9 NASAL POLYPS: ICD-10-CM

## 2022-05-16 DIAGNOSIS — E04.1 THYROID NODULE: ICD-10-CM

## 2022-05-16 PROCEDURE — 31231 NASAL ENDOSCOPY DX: CPT | Performed by: OTOLARYNGOLOGY

## 2022-05-16 PROCEDURE — 25010000002 ONDANSETRON PER 1 MG: Performed by: NURSE ANESTHETIST, CERTIFIED REGISTERED

## 2022-05-16 PROCEDURE — 88307 TISSUE EXAM BY PATHOLOGIST: CPT | Performed by: OTOLARYNGOLOGY

## 2022-05-16 PROCEDURE — 25010000002 HYDROMORPHONE 1 MG/ML SOLUTION: Performed by: NURSE ANESTHETIST, CERTIFIED REGISTERED

## 2022-05-16 PROCEDURE — 25010000002 CEFAZOLIN PER 500 MG: Performed by: NURSE ANESTHETIST, CERTIFIED REGISTERED

## 2022-05-16 PROCEDURE — 60225 PARTIAL REMOVAL OF THYROID: CPT | Performed by: OTOLARYNGOLOGY

## 2022-05-16 PROCEDURE — 25010000002 PROPOFOL 10 MG/ML EMULSION: Performed by: NURSE ANESTHETIST, CERTIFIED REGISTERED

## 2022-05-16 PROCEDURE — 25010000002 MIDAZOLAM PER 1 MG: Performed by: ANESTHESIOLOGY

## 2022-05-16 PROCEDURE — 25010000002 FENTANYL CITRATE (PF) 50 MCG/ML SOLUTION: Performed by: NURSE ANESTHETIST, CERTIFIED REGISTERED

## 2022-05-16 PROCEDURE — 25010000002 DEXAMETHASONE PER 1 MG: Performed by: NURSE ANESTHETIST, CERTIFIED REGISTERED

## 2022-05-16 PROCEDURE — 0 HYDROMORPHONE 1 MG/ML SOLUTION: Performed by: NURSE ANESTHETIST, CERTIFIED REGISTERED

## 2022-05-16 DEVICE — HORIZON TI MED 24 CLIPS/POUCH
Type: IMPLANTABLE DEVICE | Site: NECK | Status: FUNCTIONAL
Brand: WECK

## 2022-05-16 DEVICE — HORIZON TI SMALL 24 CLIPS/POUCH
Type: IMPLANTABLE DEVICE | Site: NECK | Status: FUNCTIONAL
Brand: WECK

## 2022-05-16 DEVICE — ABSORBABLE HEMOSTAT (OXIDIZED REGENERATED CELLULOSE, U.S.P.)
Type: IMPLANTABLE DEVICE | Site: NECK | Status: FUNCTIONAL
Brand: SURGICEL

## 2022-05-16 RX ORDER — ONDANSETRON 2 MG/ML
INJECTION INTRAMUSCULAR; INTRAVENOUS AS NEEDED
Status: DISCONTINUED | OUTPATIENT
Start: 2022-05-16 | End: 2022-05-16 | Stop reason: SURG

## 2022-05-16 RX ORDER — DEXMEDETOMIDINE HYDROCHLORIDE 100 UG/ML
INJECTION, SOLUTION INTRAVENOUS AS NEEDED
Status: DISCONTINUED | OUTPATIENT
Start: 2022-05-16 | End: 2022-05-16 | Stop reason: SURG

## 2022-05-16 RX ORDER — CEPHALEXIN 250 MG/1
250 CAPSULE ORAL 3 TIMES DAILY
Qty: 7 CAPSULE | Refills: 0 | Status: SHIPPED | OUTPATIENT
Start: 2022-05-16 | End: 2022-08-01

## 2022-05-16 RX ORDER — ONDANSETRON 2 MG/ML
4 INJECTION INTRAMUSCULAR; INTRAVENOUS ONCE AS NEEDED
Status: DISCONTINUED | OUTPATIENT
Start: 2022-05-16 | End: 2022-05-16 | Stop reason: HOSPADM

## 2022-05-16 RX ORDER — SUCCINYLCHOLINE/SOD CL,ISO/PF 100 MG/5ML
SYRINGE (ML) INTRAVENOUS AS NEEDED
Status: DISCONTINUED | OUTPATIENT
Start: 2022-05-16 | End: 2022-05-16 | Stop reason: SURG

## 2022-05-16 RX ORDER — GLYCOPYRROLATE 0.2 MG/ML
0.2 INJECTION INTRAMUSCULAR; INTRAVENOUS
Status: COMPLETED | OUTPATIENT
Start: 2022-05-16 | End: 2022-05-16

## 2022-05-16 RX ORDER — OXYCODONE HYDROCHLORIDE 5 MG/1
5 TABLET ORAL
Status: COMPLETED | OUTPATIENT
Start: 2022-05-16 | End: 2022-05-16

## 2022-05-16 RX ORDER — PROMETHAZINE HYDROCHLORIDE 12.5 MG/1
25 TABLET ORAL ONCE AS NEEDED
Status: DISCONTINUED | OUTPATIENT
Start: 2022-05-16 | End: 2022-05-16 | Stop reason: HOSPADM

## 2022-05-16 RX ORDER — PROPOFOL 10 MG/ML
VIAL (ML) INTRAVENOUS AS NEEDED
Status: DISCONTINUED | OUTPATIENT
Start: 2022-05-16 | End: 2022-05-16 | Stop reason: SURG

## 2022-05-16 RX ORDER — SODIUM CHLORIDE, SODIUM LACTATE, POTASSIUM CHLORIDE, CALCIUM CHLORIDE 600; 310; 30; 20 MG/100ML; MG/100ML; MG/100ML; MG/100ML
9 INJECTION, SOLUTION INTRAVENOUS CONTINUOUS PRN
Status: DISCONTINUED | OUTPATIENT
Start: 2022-05-16 | End: 2022-05-16 | Stop reason: HOSPADM

## 2022-05-16 RX ORDER — MAGNESIUM HYDROXIDE 1200 MG/15ML
LIQUID ORAL AS NEEDED
Status: DISCONTINUED | OUTPATIENT
Start: 2022-05-16 | End: 2022-05-16 | Stop reason: HOSPADM

## 2022-05-16 RX ORDER — PROMETHAZINE HYDROCHLORIDE 25 MG/1
25 SUPPOSITORY RECTAL ONCE AS NEEDED
Status: DISCONTINUED | OUTPATIENT
Start: 2022-05-16 | End: 2022-05-16 | Stop reason: HOSPADM

## 2022-05-16 RX ORDER — OXYMETAZOLINE HYDROCHLORIDE 0.05 G/100ML
SPRAY NASAL AS NEEDED
Status: DISCONTINUED | OUTPATIENT
Start: 2022-05-16 | End: 2022-05-16 | Stop reason: HOSPADM

## 2022-05-16 RX ORDER — ACETAMINOPHEN 500 MG
1000 TABLET ORAL ONCE
Status: COMPLETED | OUTPATIENT
Start: 2022-05-16 | End: 2022-05-16

## 2022-05-16 RX ORDER — MEPERIDINE HYDROCHLORIDE 25 MG/ML
12.5 INJECTION INTRAMUSCULAR; INTRAVENOUS; SUBCUTANEOUS
Status: DISCONTINUED | OUTPATIENT
Start: 2022-05-16 | End: 2022-05-16 | Stop reason: HOSPADM

## 2022-05-16 RX ORDER — LIDOCAINE HYDROCHLORIDE AND EPINEPHRINE 10; 10 MG/ML; UG/ML
INJECTION, SOLUTION INFILTRATION; PERINEURAL AS NEEDED
Status: DISCONTINUED | OUTPATIENT
Start: 2022-05-16 | End: 2022-05-16 | Stop reason: HOSPADM

## 2022-05-16 RX ORDER — LIDOCAINE HYDROCHLORIDE 20 MG/ML
INJECTION, SOLUTION EPIDURAL; INFILTRATION; INTRACAUDAL; PERINEURAL AS NEEDED
Status: DISCONTINUED | OUTPATIENT
Start: 2022-05-16 | End: 2022-05-16 | Stop reason: SURG

## 2022-05-16 RX ORDER — CEFAZOLIN SODIUM 2 G/100ML
2 INJECTION, SOLUTION INTRAVENOUS
Status: DISCONTINUED | OUTPATIENT
Start: 2022-05-16 | End: 2022-05-16 | Stop reason: HOSPADM

## 2022-05-16 RX ORDER — ROCURONIUM BROMIDE 10 MG/ML
INJECTION, SOLUTION INTRAVENOUS AS NEEDED
Status: DISCONTINUED | OUTPATIENT
Start: 2022-05-16 | End: 2022-05-16 | Stop reason: SURG

## 2022-05-16 RX ORDER — HYDROCODONE BITARTRATE AND ACETAMINOPHEN 5; 325 MG/1; MG/1
1-2 TABLET ORAL EVERY 6 HOURS PRN
Qty: 30 TABLET | Refills: 0 | Status: SHIPPED | OUTPATIENT
Start: 2022-05-16 | End: 2022-08-01

## 2022-05-16 RX ORDER — FENTANYL CITRATE 50 UG/ML
INJECTION, SOLUTION INTRAMUSCULAR; INTRAVENOUS AS NEEDED
Status: DISCONTINUED | OUTPATIENT
Start: 2022-05-16 | End: 2022-05-16 | Stop reason: SURG

## 2022-05-16 RX ORDER — KETAMINE HYDROCHLORIDE 50 MG/ML
INJECTION, SOLUTION, CONCENTRATE INTRAMUSCULAR; INTRAVENOUS AS NEEDED
Status: DISCONTINUED | OUTPATIENT
Start: 2022-05-16 | End: 2022-05-16 | Stop reason: SURG

## 2022-05-16 RX ORDER — CEFAZOLIN SODIUM 1 G/3ML
INJECTION, POWDER, FOR SOLUTION INTRAMUSCULAR; INTRAVENOUS AS NEEDED
Status: DISCONTINUED | OUTPATIENT
Start: 2022-05-16 | End: 2022-05-16 | Stop reason: SURG

## 2022-05-16 RX ORDER — DEXAMETHASONE SODIUM PHOSPHATE 4 MG/ML
INJECTION, SOLUTION INTRA-ARTICULAR; INTRALESIONAL; INTRAMUSCULAR; INTRAVENOUS; SOFT TISSUE AS NEEDED
Status: DISCONTINUED | OUTPATIENT
Start: 2022-05-16 | End: 2022-05-16 | Stop reason: SURG

## 2022-05-16 RX ORDER — EPHEDRINE SULFATE 50 MG/ML
INJECTION, SOLUTION INTRAVENOUS AS NEEDED
Status: DISCONTINUED | OUTPATIENT
Start: 2022-05-16 | End: 2022-05-16 | Stop reason: SURG

## 2022-05-16 RX ORDER — MIDAZOLAM HYDROCHLORIDE 1 MG/ML
2 INJECTION INTRAMUSCULAR; INTRAVENOUS ONCE
Status: COMPLETED | OUTPATIENT
Start: 2022-05-16 | End: 2022-05-16

## 2022-05-16 RX ORDER — GINSENG 100 MG
CAPSULE ORAL AS NEEDED
Status: DISCONTINUED | OUTPATIENT
Start: 2022-05-16 | End: 2022-05-16 | Stop reason: HOSPADM

## 2022-05-16 RX ADMIN — DEXMEDETOMIDINE HYDROCHLORIDE 20 MCG: 100 INJECTION, SOLUTION, CONCENTRATE INTRAVENOUS at 11:20

## 2022-05-16 RX ADMIN — DEXMEDETOMIDINE HYDROCHLORIDE 10 MCG: 100 INJECTION, SOLUTION, CONCENTRATE INTRAVENOUS at 12:45

## 2022-05-16 RX ADMIN — FENTANYL CITRATE 100 MCG: 50 INJECTION, SOLUTION INTRAMUSCULAR; INTRAVENOUS at 10:57

## 2022-05-16 RX ADMIN — DEXMEDETOMIDINE HYDROCHLORIDE 10 MCG: 100 INJECTION, SOLUTION, CONCENTRATE INTRAVENOUS at 11:59

## 2022-05-16 RX ADMIN — GLYCOPYRROLATE 0.2 MG: 0.2 INJECTION INTRAMUSCULAR; INTRAVENOUS at 10:33

## 2022-05-16 RX ADMIN — OXYCODONE HYDROCHLORIDE 5 MG: 5 TABLET ORAL at 15:16

## 2022-05-16 RX ADMIN — PROPOFOL 30 MG: 10 INJECTION, EMULSION INTRAVENOUS at 13:40

## 2022-05-16 RX ADMIN — KETAMINE HYDROCHLORIDE 20 MG: 50 INJECTION, SOLUTION INTRAMUSCULAR; INTRAVENOUS at 11:00

## 2022-05-16 RX ADMIN — FENTANYL CITRATE 100 MCG: 50 INJECTION, SOLUTION INTRAMUSCULAR; INTRAVENOUS at 11:21

## 2022-05-16 RX ADMIN — SODIUM CHLORIDE, POTASSIUM CHLORIDE, SODIUM LACTATE AND CALCIUM CHLORIDE: 600; 310; 30; 20 INJECTION, SOLUTION INTRAVENOUS at 12:41

## 2022-05-16 RX ADMIN — Medication 100 MG: at 11:00

## 2022-05-16 RX ADMIN — OXYCODONE HYDROCHLORIDE 5 MG: 5 TABLET ORAL at 15:34

## 2022-05-16 RX ADMIN — SODIUM CHLORIDE, POTASSIUM CHLORIDE, SODIUM LACTATE AND CALCIUM CHLORIDE 9 ML/HR: 600; 310; 30; 20 INJECTION, SOLUTION INTRAVENOUS at 09:36

## 2022-05-16 RX ADMIN — ONDANSETRON 4 MG: 2 INJECTION INTRAMUSCULAR; INTRAVENOUS at 11:07

## 2022-05-16 RX ADMIN — ROCURONIUM BROMIDE 5 MG: 10 INJECTION INTRAVENOUS at 11:00

## 2022-05-16 RX ADMIN — DEXAMETHASONE SODIUM PHOSPHATE 8 MG: 4 INJECTION INTRA-ARTICULAR; INTRALESIONAL; INTRAMUSCULAR; INTRAVENOUS; SOFT TISSUE at 11:07

## 2022-05-16 RX ADMIN — MIDAZOLAM HYDROCHLORIDE 2 MG: 1 INJECTION, SOLUTION INTRAMUSCULAR; INTRAVENOUS at 10:33

## 2022-05-16 RX ADMIN — HYDROMORPHONE HYDROCHLORIDE 0.5 MG: 1 INJECTION, SOLUTION INTRAMUSCULAR; INTRAVENOUS; SUBCUTANEOUS at 14:59

## 2022-05-16 RX ADMIN — PROPOFOL 200 MG: 10 INJECTION, EMULSION INTRAVENOUS at 11:00

## 2022-05-16 RX ADMIN — DEXMEDETOMIDINE HYDROCHLORIDE 20 MCG: 100 INJECTION, SOLUTION, CONCENTRATE INTRAVENOUS at 11:48

## 2022-05-16 RX ADMIN — HYDROMORPHONE HYDROCHLORIDE 0.5 MG: 1 INJECTION, SOLUTION INTRAMUSCULAR; INTRAVENOUS; SUBCUTANEOUS at 14:34

## 2022-05-16 RX ADMIN — HYDROMORPHONE HYDROCHLORIDE 1 MG: 1 INJECTION, SOLUTION INTRAMUSCULAR; INTRAVENOUS; SUBCUTANEOUS at 12:01

## 2022-05-16 RX ADMIN — KETAMINE HYDROCHLORIDE 30 MG: 50 INJECTION, SOLUTION INTRAMUSCULAR; INTRAVENOUS at 11:08

## 2022-05-16 RX ADMIN — LIDOCAINE HYDROCHLORIDE 100 MG: 20 INJECTION, SOLUTION EPIDURAL; INFILTRATION; INTRACAUDAL; PERINEURAL at 12:00

## 2022-05-16 RX ADMIN — EPHEDRINE SULFATE 10 MG: 50 INJECTION INTRAVENOUS at 12:07

## 2022-05-16 RX ADMIN — ACETAMINOPHEN 1000 MG: 500 TABLET ORAL at 09:37

## 2022-05-16 RX ADMIN — CEFAZOLIN SODIUM 2 G: 1 INJECTION, POWDER, FOR SOLUTION INTRAMUSCULAR; INTRAVENOUS at 11:00

## 2022-05-16 NOTE — OP NOTE
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    A Thyroid Tissue · TISSUE PATHOLOGY EXAM   Reji Beebe MD 5/16/22 1316     Description: RIGHT THYROID LOBE AND ISTHMUS          Findings:   1. Normal size right thyroid lobe with posterior soft nodule  2. Recurrent laryngeal nerve identified and preserved     Complications: None    Description of Procedure:   The patient was brought into the operating room and placed in the supine position on the operating room table. Mask inhalational anesthesia was induced, the patient was intubated orotracheally with a recurrent laryngeal nerve monitoring endotracheal tube. The head of the bed was turned 90°. Time out was performed to identify the correct patient and procedure. Lidocaine 1% with 1-100,000 epinephrine was used to inject the anterior neck 2 fingerbreadths above the level of the clavicular heads. A total of 5.5 mL was used. Next, recurrent laryngeal nerve monitoring grounding and stimulating electrodes were placed in the chest skin, and the nerve monitoring system was calibrated and tested, and was functional. The patient was then prepped and draped in the usual sterile  fashion.    Next, the 0 degree endoscope was used to assess the left and right nasal passages and Afrin-soaked pledgets were used to decongest the nose.  There was significant congestion prior to use of Afrin, but no polyps were seen just congested appearing mucosa.  After decongestion, it was apparent that there were no polyps and it was just the appearance of the swollen linings.  No intervention was carried out inside the nose.    Attention was then turned to the thyroidectomy portion of the procedure.    A 15 blade was used to make a 6 cm incision 2 fingerbreadths above the clavicular heads along the right neck. The Bovie cautery was used to transect through the subcutaneous tissues until the platysma was reached. The platysma was divided with the Bovie cautery, and subplatysmal flaps were raised in a superior direction towards the hyoid, and inferiorly to the jugular notch of the sternum, and laterally to the sternocleidomastoid muscles. The tonsil dissector along with the Bovie were used to dissect down vertically until the strap musculature was reached. The midline raphae was identified and divided in the midline vertically using Bovie. The strap musculature was lateralized to the laterally using Army-Navy retractors, and the thyroid gland was identified. Attention was turned to the right thyroid lobe. The tonsil dissector was used to meticulously dissect the strap musculature off the lateral portion of the thyroid gland using the Bovie cautery. The thyroid gland appeared to have multiple nodules, the inferior most having palpable calcifications. The Moon nerve dissector was used to carefully dissect the soft tissues off of the thyroid gland, and the bipolar cautery was used to transect the tissues. The Army-Navy was used to lateralize the strap musculature, and Republic retractors were used to grasp the thyroid gland and the thyroid gland was retracted anteriorly and medially. The middle thyroid vein was  identified and was dissected around. The vein was divided and ligated with silk suture. Next, attention was turned to the superior pole. The vascular bundle was isolated from surrounding structures using a tonsil dissector, and tied with silk ties and divided. Attention was turned to the inferior pole. The recurrent laryngeal nerve was identified in its typical position coursing from posterior to anterior towards the cricothyroid joint. The nerve was stimulated with the nerve stimulator, and confirmed. Next, silk ties were used to ligate the vascular bundle inferiorly immediately on the thyroid gland, and this was divided. The thyroid gland was retracted in a superior direction and was sharply dissected away from the trachea. Next, the thyroid gland was retracted further anteriorly and the Red nerve dissector was used to meticulously dissect the thyroid gland tissues off of the recurrent laryngeal nerve. The nerve was completely preserved. The thyroid gland was then dissected away from the trachea and towards the contralateral lobe. The Bovie cautery was then used to transect the isthmus on the right side with part of the right thyroid lobe. Specimen was then sent to pathology as right thyroid lobe and isthmus. There were no other tumors noted within the surgical bed.    Hemostasis was achieved in the field using bipolar cautery. The recurrent laryngeal nerve was stimulated at a level of 0.5 mA and responded positively. Surgicel was laid down into the surgical bed after Valsalva. A round drain was placed into the surgical bed to exit the neck just to the left lateral aspect of the incision site.     The strap musculature was reapproximated in the midline using a single running 3-0 Vicryl suture. The platysma was closed in a deep dermal fashion with interrupted 3-0 Vicryl suture. The skin was then closed with a subcuticular 5-0 Monocryl suture as well as Mastisol and Steri-Strips. The drain was placed to suction  and held well. The drain was affixed to the skin with a 3-0 nylon suture. The electrodes were removed from the right shoulder.    The recurrent laryngeal nerve was monitored for a total of 2 hours.    At this point the patient's care was handed back to anesthesia in good condition without any complications. The patient was sent to the PACU and will be watched for several hours.       Assistant: Nagi Alfred  was responsible for performing the following activities: Retraction, Suction and Irrigation and their skilled assistance was necessary for the success of this case.    Reji Beebe MD     Date: 5/16/2022  Time: 13:56 EDT

## 2022-05-16 NOTE — ANESTHESIA PREPROCEDURE EVALUATION
Anesthesia Evaluation     Patient summary reviewed and Nursing notes reviewed   no history of anesthetic complications:  NPO Solid Status: > 8 hours  NPO Liquid Status: > 2 hours           Airway   Mallampati: II  TM distance: >3 FB  Neck ROM: full  No difficulty expected  Dental      Pulmonary - negative pulmonary ROS and normal exam    breath sounds clear to auscultation  Cardiovascular - negative cardio ROS and normal exam  Exercise tolerance: good (4-7 METS)    Rhythm: regular  Rate: normal        Neuro/Psych  (+) psychiatric history Anxiety and Depression,    GI/Hepatic/Renal/Endo - negative ROS     Musculoskeletal (-) negative ROS    Abdominal    Substance History - negative use     OB/GYN negative ob/gyn ROS         Other      history of cancer                    Anesthesia Plan    ASA 2     general   (Patient understands anesthesia not responsible for dental damage.)  intravenous induction     Anesthetic plan, all risks, benefits, and alternatives have been provided, discussed and informed consent has been obtained with: patient.  Use of blood products discussed with patient .   Plan discussed with CRNA.        CODE STATUS:

## 2022-05-16 NOTE — ANESTHESIA POSTPROCEDURE EVALUATION
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        Post Anesthesia Care and Evaluation    Patient location during evaluation: bedside  Patient participation: complete - patient participated  Level of consciousness: awake  Pain score: 0  Pain management: adequate  Airway patency: patent  Anesthetic complications: No anesthetic complications  PONV Status: none  Cardiovascular status: acceptable and stable  Respiratory status: acceptable and room air  Hydration status: acceptable    Comments: An Anesthesiologist personally participated in the most demanding procedures (including induction and emergence if applicable) in the anesthesia plan, monitored the course of anesthesia administration at frequent intervals and remained physically present and available for immediate diagnosis and treatment of emergencies.

## 2022-05-16 NOTE — DISCHARGE INSTRUCTIONS
1. DC home  2. F/U in ENT clinic Thursday this week  3. Norco and Keflex scripts given, transition to Tylenol/Motrin OTC PRN pain when able  4. Keep incisin dry 48 hrs  5. Drain teaching, keep diary  6. No strenuous activity for 2 weeks     DISCHARGE INSTRUCTIONS  SURGICAL / AMBULATORY  PROCEDURES      For your surgery you had:  General anesthesia (you may have a sore throat for the first 24 hours)  IV sedation.  Local anesthesia  Monitored anesthesia Care  You received a medicated patch for nausea prevention today (behind your ear). It is recommended that you remove it 24-48 hours post-operatively. It must be removed within 72 hours.   You have received an anesthesia medication today that can cause hormonal forms of birth control to be ineffective. You should use a different form of birth control (to prevent pregnancy) for 7 days.   You may experience dizziness, drowsiness, or light-headedness for several hours following surgery/procedure.  Do not stay alone today or tonight.  Limit your activity for 24 hours.  Resume your diet slowly.  Follow whatever special dietary instructions you may have been given by your doctor.  You should not drive or operate machinery, drink alcohol, or sign legally binding documents for 24 hours or while you are taking pain medication.  Last dose of pain medication was given at:   .  NOTIFY YOUR DOCTOR IF YOU EXPERIENCE ANY OF THE FOLLOWING:  Temperature greater than 101 degrees Fahrenheit  Shaking Chills  Redness or excessive drainage from incision  Nausea, vomiting and/or pain that is not controlled by prescribed medications  Increase in bleeding or bleeding that is excessive  Unable to urinate in 6 hours after surgery  If unable to reach your doctor, please go to the closest Emergency Room  You may begin dressing changes:     [] in 24 hours   [] in 48 hours   [] Other:    You may remove Band-Aid/dressing tomorrow.  You may shower or bathe   .  Apply an ice pack 24-48  hours.  Medications per physician instructions as indicated on Discharge Medication Information Sheet.  You should see   for follow-up care   on   .  Phone number:       SPECIAL INSTRUCTIONS:

## 2022-05-17 ENCOUNTER — TELEPHONE (OUTPATIENT)
Dept: OTOLARYNGOLOGY | Facility: CLINIC | Age: 37
End: 2022-05-17

## 2022-05-17 NOTE — TELEPHONE ENCOUNTER
Provider: DR SCOTT  Caller: GINO  Relationship to Patient: PT'S GIRLFRIEND    Phone Number: [phone]  Reason for Call: GINO WAS CALLING TO ASK QUESTIONS, NOT ON [facility] VERBAL AND PT IS AT HOME. SHE WOULD LIKE SOMEONE TO CALL PT BACK- PT HAD Right THYROIDECTOMY, excision of nasal polyps YESTERDAY.  THE QUESTIONS ARE: PT'S   PUMP DOES NOT HAVE GOOD SUCTION AT THE BOTTOM OF IT AND THERE IS DRIED BLOOD IN THE TUBE-  IS THAT OK? PLEASE GIVE PT A CALL BACK   When was the patient last seen: 5-16-22

## 2022-05-18 ENCOUNTER — TELEPHONE (OUTPATIENT)
Dept: FAMILY MEDICINE CLINIC | Age: 37
End: 2022-05-18

## 2022-05-18 ENCOUNTER — OFFICE VISIT (OUTPATIENT)
Dept: OTOLARYNGOLOGY | Facility: CLINIC | Age: 37
End: 2022-05-18

## 2022-05-18 VITALS — TEMPERATURE: 97.8 F | BODY MASS INDEX: 22.47 KG/M2 | WEIGHT: 139.8 LBS | HEIGHT: 66 IN

## 2022-05-18 DIAGNOSIS — D49.7 FOLLICULAR NEOPLASM OF THYROID: ICD-10-CM

## 2022-05-18 DIAGNOSIS — E04.1 THYROID NODULE: ICD-10-CM

## 2022-05-18 DIAGNOSIS — J34.89 NASAL OBSTRUCTION: Primary | ICD-10-CM

## 2022-05-18 PROBLEM — J33.9 NASAL POLYPS: Status: RESOLVED | Noted: 2022-04-21 | Resolved: 2022-05-18

## 2022-05-18 PROBLEM — R68.84 JAW PAIN: Status: RESOLVED | Noted: 2021-07-23 | Resolved: 2022-05-18

## 2022-05-18 LAB
CYTO UR: NORMAL
LAB AP CASE REPORT: NORMAL
LAB AP CLINICAL INFORMATION: NORMAL
PATH REPORT.FINAL DX SPEC: NORMAL
PATH REPORT.GROSS SPEC: NORMAL

## 2022-05-18 PROCEDURE — 31575 DIAGNOSTIC LARYNGOSCOPY: CPT | Performed by: OTOLARYNGOLOGY

## 2022-05-18 PROCEDURE — 99214 OFFICE O/P EST MOD 30 MIN: CPT | Performed by: OTOLARYNGOLOGY

## 2022-05-18 NOTE — PROGRESS NOTES
Patient Name: Juan M Lynch   Visit Date: 05/18/2022   Patient ID: [id number]  Provider: Reji Beebe MD    Sex: male  Location: [facility]   YOB: 1985  Location Address: [address] [place] [address], [address],?[place]?[id number]    Primary Care Provider Zoila Mckinney APRN  Location Phone: [phone]    Referring Provider: No ref. provider found        Chief Complaint  Post-op    Subjective    History of Present Illness  Juan M Lynch is a 36 y.o. male who presents to [facility] today as a consult from No ref. provider found.    He presents to the clinic today for follow-up after undergoing right thyroid lobe and isthmus excision for follicular neoplasm 3 days ago.  He denies any major issues, and his pain has been well controlled.  The round drain was removed and he notes minimal drainage or swelling.  Voice has been normal and swallowing is slightly uncomfortable but otherwise normal.  He has been keeping the surgical site dry and using triple antibiotic ointment.  Pathology report suggest follicular adenoma measuring about 1.3 cm.  There was 1 normocellular parathyroid gland 1 benign KOREY-thyroidal lymph node.  Specimen was completely negative for malignancy.    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   • Thoracic outlet syndrome    • Thyroid cancer (HCC)    • Thyroid nodule        Past Surgical History:   Procedure Laterality Date   • ELBOW PROCEDURE     • NASAL ENDOSCOPY N/A 5/16/2022    Procedure: NASAL ENDOSCOPY;  Surgeon: Reji Beebe MD;  Location: [facility];  Service: ENT;  Laterality: N/A;   • THYROIDECTOMY Right 5/16/2022    Procedure: Right THYROIDECTOMY, excision of nasal polyps;  Surgeon: Reji Beebe MD;  Location: [facility];  Service: ENT;  Laterality: Right;   • VASECTOMY           Current Outpatient  "Medications:   •  cephalexin (Keflex) 250 MG capsule, Take 1 capsule by mouth 3 (Three) Times a Day., Disp: 7 capsule, Rfl: 0  •  HYDROcodone-acetaminophen (NORCO) 5-325 MG per tablet, Take 1-2 tablets by mouth Every 6 (Six) Hours As Needed for Moderate Pain ., Disp: 30 tablet, Rfl: 0  •  aspirin-acetaminophen-caffeine (EXCEDRIN MIGRAINE) 250-250-65 MG per tablet, Take 1 tablet by mouth Every 6 (Six) Hours As Needed for Headache., Disp: , Rfl:   •  ibuprofen (ADVIL,MOTRIN) 200 MG tablet, Take 200 mg by mouth Every 6 (Six) Hours As Needed for Mild Pain ., Disp: , Rfl:      Allergies   Allergen Reactions   • Guaifenesin Anaphylaxis   • Robitussin Severe Cgh-Sr Thrt [Acetaminophen-Dm] Anaphylaxis       Family History   Problem Relation Age of Onset   • No Known Problems Mother    • No Known Problems Father    • Malig Hyperthermia Neg Hx         Social History     Social History Narrative   • Not on file       Objective     Vital Signs:   Temp 97.8 °F (36.6 °C) (Tympanic)   Ht 167.6 cm (66\")   Wt 63.4 kg (139 lb 12.8 oz)   BMI 22.56 kg/m²       Physical Exam    Flexible laryngoscopy    Date/Time: 5/18/2022 3:05 PM  Performed by: Reji Beebe MD  Authorized by: Reji Beebe MD     Procedure details:     Indications: evaluation of larynx      Medication:  Afrin    Instrument: flexible fiberoptic laryngoscope      Scope location: right nare    Comments:      Nasal cavities free of any lesions, polyps, or purulence.  Vocal folds are bilaterally mobile with good excursion.  No lesions.        Constitutional   Appearance  · : well developed, well-nourished, alert and in no acute distress, voice clear and strong    Head  Inspection  · : no deformities or lesions  Face  Inspection  · : No facial lesions; House-Brackmann I/VI bilaterally  Palpation  · : No TMJ crepitus nor  muscle tenderness bilaterally    Eyes  Vision  Visual Fields  · : Extraocular movements are intact. No spontaneous or gaze-induced " nystagmus.  Conjunctivae  · : clear  Sclerae  · : clear  Pupils and Irises  · : pupils equal, round, and reactive to light.     Ears, Nose, Mouth and Throat    Ears    External Ears  · : appearance within normal limits, no lesions present  Otoscopic Examination  · : Tympanic membrane appearance within normal limits bilaterally without perforations, well-aerated middle ears  Hearing  · : intact to conversational voice both ears  Tunning fork testing:     :    Nose    External Nose  · : appearance normal  Intranasal Exam  · : mucosa within normal limits, vestibules normal, no intranasal lesions present, septum midline, sinuses non tender to percussion  Oral Cavity    Oral Mucosa  · : oral mucosa normal without pallor or cyanosis  Lips  · : lip appearance normal  Teeth  · : normal dentition for age  Gums  · : gums pink, non-swollen, no bleeding present  Tongue  · : tongue appearance normal; normal mobility  Palate  · : hard palate normal, soft palate appearance normal with symmetric mobility    Throat    Oropharynx  · : no inflammation or lesions present, tonsils within normal limits  Hypopharynx  · : appearance within normal limits, superior epiglottis within normal limits  Larynx  · : appearance within normal limits, vocal cords within normal limits, no lesions present    Neck  Inspection/Palpation  · : Well-healing surgical site, MEENA drain suture removed, no masses or tenderness, trachea midline; thyroid size normal, nontender, no nodules or masses present on palpation    Respiratory  Respiratory Effort  · : breathing unlabored  Inspection of Chest  · : normal appearance, no retractions    Cardiovascular  Heart  · : regular rate and rhythm    Lymphatic  Neck  · : no lymphadenopathy present  Supraclavicular Nodes  · : no lymphadenopathy present  Preauricular Nodes  · : no lymphadenopathy present    Skin and Subcutaneous Tissue  General Inspection  · : Regarding face and neck - there are no rashes present, no lesions  present, and no areas of discoloration    Neurologic  Cranial Nerves  · : cranial nerves II-XII are grossly intact bilaterally  Gait and Station  · : normal gait, able to stand without diffculty    Psychiatric  Judgement and Insight  · : judgment and insight intact  Mood and Affect  · : mood normal, affect appropriate          Assessment and Plan    Diagnoses and all orders for this visit:    1. Nasal obstruction (Primary)    2. Thyroid nodule  -     T4 & TSH ([facility]); Future    3. Follicular neoplasm of thyroid  -     T4 & TSH ([facility]); Future    Examination today revealed a well-healing surgical site.  Nasal laryngoscopy was performed and reveals normal vocal fold mobility with good symmetry.  I discussed the pathology with him.  I will plan on repeating his thyroid blood work in about 6 weeks and we will see him back shortly afterwards to make sure he is euthyroid.  We discussed surgical wound care and I recommended no strenuous physical activity for about 1 to 2 weeks.    Follow Up   No follow-ups on file.  Patient was given instructions and counseling regarding his condition or for health maintenance advice. Please see specific information pulled into the AVS if appropriate.

## 2022-05-18 NOTE — TELEPHONE ENCOUNTER
Patient came in this afternoon requesting an Urology Referral for a vasectomy. If agreeable, can you place the order and I will schedule the patient.     Thanks,   Mohini Alfaro,   Referrals Coordinator

## 2022-05-20 DIAGNOSIS — Z30.09 STERILIZATION CONSULT: Primary | ICD-10-CM

## 2022-06-02 ENCOUNTER — TREATMENT (OUTPATIENT)
Dept: PHYSICAL THERAPY | Facility: CLINIC | Age: 37
End: 2022-06-02

## 2022-06-02 DIAGNOSIS — G54.0 TOS (THORACIC OUTLET SYNDROME): Primary | ICD-10-CM

## 2022-06-02 DIAGNOSIS — M25.511 RIGHT SHOULDER PAIN, UNSPECIFIED CHRONICITY: ICD-10-CM

## 2022-06-02 DIAGNOSIS — R20.2 PARESTHESIAS IN RIGHT HAND: ICD-10-CM

## 2022-06-02 DIAGNOSIS — M79.601 PAIN OF RIGHT UPPER EXTREMITY: ICD-10-CM

## 2022-06-02 DIAGNOSIS — R29.898 WEAKNESS OF SHOULDER: ICD-10-CM

## 2022-06-02 PROCEDURE — 97140 MANUAL THERAPY 1/> REGIONS: CPT | Performed by: PHYSICAL THERAPIST

## 2022-06-02 PROCEDURE — 97530 THERAPEUTIC ACTIVITIES: CPT | Performed by: PHYSICAL THERAPIST

## 2022-06-02 PROCEDURE — 97110 THERAPEUTIC EXERCISES: CPT | Performed by: PHYSICAL THERAPIST

## 2022-06-02 PROCEDURE — 97112 NEUROMUSCULAR REEDUCATION: CPT | Performed by: PHYSICAL THERAPIST

## 2022-06-02 NOTE — PROGRESS NOTES
Re-Assessment / Re-Certification        Patient: Juan M Lynch   : 1985  Diagnosis/ICD-10 Code:  TOS (thoracic outlet syndrome) [G54.0]  Referring practitioner: Manan Gasca III, MD  Date of Initial Visit: Type: THERAPY  Noted: 2022  Today's Date: 2022  Patient seen for 6 sessions      Subjective:     Visit Diagnoses:    ICD-10-CM ICD-9-CM   1. TOS (thoracic outlet syndrome)  G54.0 353.0   2. Pain of right upper extremity  M79.601 729.5   3. Right shoulder pain, unspecified chronicity  M25.511 719.41   4. Weakness of shoulder  R29.898 719.61   5. Paresthesias in right hand  R20.2 782.0     Treatment has included: therapeutic exercise, neuromuscular re-education, manual therapy and therapeutic activity      Subjective Evaluation    History of Present Illness  Mechanism of injury: 22: removal of half of the thyroid, removed polyps from the nose.  Two days later, he followed up with the thyroid doctor, removed the tube since there was problem with it, no physical activity was allowed until Monday, 22.  He was told he could return back to work on 22 per ENT doctor.  However, his thoracic doctor has not released him to go back to work yet.     R arm is 5/10 pain, the numbness/tingling is back in full force, the whole hand is numb as well.     He goes back to Dr. Gasca, the thoracic surgeon, on 22.  Before he goes back, he needs to have his third block put in he was told.    He had his second block on 22, it has helped up until last Thursday/Friday, starting waking up in the middle of the night.     Physical therapy was helping, more the manual work, started to have less radiculopathy symptoms and less pain, was started to sleep better, prior to the surgery. He has not been able to do much physical activity of HEP since the surgery per restrictions.     Pain  Current pain ratin  Quality: burning and radiating           Objective          Strength/Myotome Testing  "    Right Shoulder     Planes of Motion   Flexion: 4-   Extension: 4   Abduction: 3+   External rotation at 0°: 4-   Internal rotation at 0°: 4-     Additional Strength Details  Long lever arm increases pain and decreases strength immediately     General Comments     Shoulder Comments   TTP over R AC joint, post, lat acromion, bicipital groove    ROM:   R shoulder AROM WFL in all directions, but he does have  \"crunching/roughness\" with UE elevation starting around 90 with abduction and 120 deg with flexion.  IR had tightness and some pain at end ranges. ER WFL.                Assessment & Plan     Assessment  Impairments: abnormal muscle firing, abnormal or restricted ROM, activity intolerance, impaired physical strength and pain with function  Functional Limitations: carrying objects, lifting, sleeping, pulling, pushing, uncomfortable because of pain, reaching behind back, reaching overhead and unable to perform repetitive tasks  Assessment details: No true changes since his last progress noted on 5/14/22, prior to his surgery.  He hasn't been able to do any PT related exercise or HEP due to restrictions.  However, therapy was helping him prior to the surgery with pain control and decreased radicualr symptoms.  The nerve blocks have also helped for about two weeks.  Pt continues to have radicular symptoms down the RUE and into the hand and all fingers..  He is going to have another pain block/injection prior to seeing the doctor on the 14th.      He will continue to benefit from PT services to help achieve decreased pain levels, improve ROM and strength, and decrease radicular symptoms.  He would like to be able to return to work, but has not been cleared from the thoracic doctor at this time.     Goals  Plan Goals: SHOULDER  PROBLEMS:     1. The patient has limited ROM of the right shoulder.    LTG 1: 12 weeks:  The patient will demonstrate 160 degrees of right shoulder flexion, 150 degrees of shoulder abduction, " 70 degrees of shoulder external rotation, and 70 degrees of shoulder internal rotation to allow the patient to reach into upper kitchen cabinets and manipulate clothing behind the back with greater ease.    STATUS:  Met   STG 1a: 4 weeks:  The patient will demonstrate 155 degrees of right shoulder flexion, 120 degrees of shoulder abduction, 50 degrees of shoulder external rotation, and 50 degrees of shoulder internal rotation.    STATUS:  Met   TREATMENT: Manual therapy, therapeutic exercise, home exercise instruction, and modalities as needed to include: electrical stimulation, ultrasound, moist heat, and ice.    2. The patient has limited strength of the right shoulder.   LTG 2: 12 weeks:  The patient will demonstrate 4 /5 strength for right shoulder flexion, abduction, external rotation, and internal rotation in order to demonstrate improved shoulder stability.    STATUS:  Progressing   STG 2a: 4 weeks:  The patient will demonstrate 3+ /5 strength for right shoulder flexion, abduction, external rotation, and internal rotation.    STATUS:  Met   STG2b:  4 weeks:  The patient will be independent with home exercises.     STATUS:  Progressing   TREATMENT: Manual therapy, therapeutic exercise, home exercise instruction, and modalities as needed to include: electrical stimulation, ultrasound, moist heat, and ice.     3. The patient complains of pain to the right shoulder.   LTG 3: 12 weeks:  The patient will report a pain rating of 1 /10 or better in order to improve sleep quality and tolerance to performance of activities of daily living.    STATUS:  Progressing   STG 3a: 4 weeks:  The patient will report a pain rating of 3 /10 or better.     STATUS:  Progressing   TREATMENT: Manual therapy, therapeutic exercise, home exercise instruction, and modalities as needed to include: electrical stimulation, ultrasound, moist heat, and ice.    4. Carrying, Moving, and Handling Objects Functional Limitation     LTG 4: 12 weeks:  " The patient will demonstrate 20 % limitation by achieving a score of on the QuickDASH.    STATUS:  Met   STG 4a: 4 weeks:  The patient will demonstrate 35 % limitation by achieving a score of on the QuickDASH.      STATUS:  Met  TREATMENT:  Manual therapy, therapeutic exercise, home exercise instruction, and modalities as needed to include: moist heat, electrical stimulation, and ultrasound.    4. The patient reports radicular symptoms in the right upper extremity.   LTG 4: 12 weeks:  The patient will report a decrease in radicular symptoms in the right upper extremity by 75%.    STATUS:  Met   STG 4a: 4 weeks:  The patient will report a decrease in radicular symptoms in the right upper extremity by 50%.    STATUS:  Met   TREATMENT: Manual therapy, therapeutic exercise, home exercise instruction, cervical traction, and modalities as needed to include: moist heat, electrical stimulation, and ultrasound.        Plan  Therapy options: will be seen for skilled therapy services  Planned modality interventions: cryotherapy, dry needling, thermotherapy (hydrocollator packs) and traction  Planned therapy interventions: manual therapy, flexibility, functional ROM exercises, home exercise program, therapeutic activities, stretching, strengthening, soft tissue mobilization, postural training, neuromuscular re-education, ADL retraining and body mechanics training  Frequency: 1x week  Duration in weeks: 4  Treatment plan discussed with: patient  Plan details: Continue with scapular/thoracic mobility, improve further mobility with less \"crunching/popping\", continue to decrease any radicular symptoms, manual work, nerve glides.    Pt is going for a third nerve block/injection and will follow up with thoracic doctor on the 14th of June.         Progress toward previous goals: Partially Met        Recommendations: Continue as planned  Timeframe: 1 month  Prognosis to achieve goals: good    Timed:  Manual Therapy:    8     mins  " 88140;  Therapeutic Exercise:    10     mins  58843;     Neuromuscular Herbie:    15    mins  34502;    Therapeutic Activity:     8     mins  15077;     Gait Training:           mins  25719;     Ultrasound:          mins  01024;    Canalith Repos           ___  mins  96999      Untimed:  Electrical Stimulation:         mins  07428 ( );  Mechanical Traction:        mins  81526;   Dry Needling:                     mins self pay  Re-Eval:                           mins  22918         Timed Treatment:   41   mins   Total Treatment:     47   mins          PT SIGNATURE: Gin Power PT, DPT  [place] License: [id number]       Certification Period: 6/2/2022 thru 8/30/2022  I certify that the therapy services are furnished while this patient is under my care.  The services outlined above are required by this patient, and will be reviewed every 90 days.     PHYSICIAN: Manan Gasca III, MD  NPI: [id number]      PHYSICIAN PRINT NAME: ______________________________________________      PHYSICIAN SIGNATURE: ______________________________________________         DATE:________________________________        Please sign and return via fax to [phone].  Thank you, [facility].

## 2022-06-07 ENCOUNTER — TREATMENT (OUTPATIENT)
Dept: PHYSICAL THERAPY | Facility: CLINIC | Age: 37
End: 2022-06-07

## 2022-06-07 DIAGNOSIS — G54.0 TOS (THORACIC OUTLET SYNDROME): Primary | ICD-10-CM

## 2022-06-07 DIAGNOSIS — R20.2 PARESTHESIAS IN RIGHT HAND: ICD-10-CM

## 2022-06-07 DIAGNOSIS — R29.898 WEAKNESS OF SHOULDER: ICD-10-CM

## 2022-06-07 DIAGNOSIS — M79.601 PAIN OF RIGHT UPPER EXTREMITY: ICD-10-CM

## 2022-06-07 DIAGNOSIS — M25.511 RIGHT SHOULDER PAIN, UNSPECIFIED CHRONICITY: ICD-10-CM

## 2022-06-07 PROCEDURE — 97140 MANUAL THERAPY 1/> REGIONS: CPT | Performed by: PHYSICAL THERAPIST

## 2022-06-07 PROCEDURE — 97110 THERAPEUTIC EXERCISES: CPT | Performed by: PHYSICAL THERAPIST

## 2022-06-07 PROCEDURE — 97112 NEUROMUSCULAR REEDUCATION: CPT | Performed by: PHYSICAL THERAPIST

## 2022-06-07 NOTE — PROGRESS NOTES
Physical Therapy Daily Treatment Note      Patient: Juan M Lynch   : 1985  Referring practitioner: Manan Gasca III, MD  Date of Initial Visit: Type: THERAPY  Noted: 2022  Today's Date: 2022  Patient seen for 7 sessions           Visit Diagnoses:    ICD-10-CM ICD-9-CM   1. TOS (thoracic outlet syndrome)  G54.0 353.0   2. Pain of right upper extremity  M79.601 729.5   3. Right shoulder pain, unspecified chronicity  M25.511 719.41   4. Weakness of shoulder  R29.898 719.61   5. Paresthesias in right hand  R20.2 782.0       Subjective Evaluation    History of Present Illness    Subjective comment: 8/10 pain on the R neck.  He had the third injection yesterday, he notes it feels different than the first two. He has more pain in the neck now.  He is not currently having numbness/tingling.  He goes back to the doctor .          Objective   See Exercise, Manual, and Modality Logs for complete treatment.       Assessment & Plan     Assessment  Impairments: abnormal muscle firing, abnormal or restricted ROM, activity intolerance, impaired physical strength and pain with function  Functional Limitations: carrying objects, lifting, sleeping, pulling, pushing, uncomfortable because of pain, reaching behind back, reaching overhead and unable to perform repetitive tasks  Assessment details: Ulnar pathway today with radicular symptoms - added in ulnar nn glides to improve flexibility and pain/radicular symptoms.  As session progressed, ulnar nn flexibility improved.  Added to HEP.  Tightness noted along the medial scapular border and UT region today, manual work used, as well as tennis ball trigger point release.  He is going for a follow up to the thoracic surgeon next week - assess any changes next visit.     Goals  Plan Goals: SHOULDER  PROBLEMS:     1. The patient has limited ROM of the right shoulder.    LTG 1: 12 weeks:  The patient will demonstrate 160 degrees of right shoulder flexion, 150  degrees of shoulder abduction, 70 degrees of shoulder external rotation, and 70 degrees of shoulder internal rotation to allow the patient to reach into upper kitchen cabinets and manipulate clothing behind the back with greater ease.    STATUS:  Met   STG 1a: 4 weeks:  The patient will demonstrate 155 degrees of right shoulder flexion, 120 degrees of shoulder abduction, 50 degrees of shoulder external rotation, and 50 degrees of shoulder internal rotation.    STATUS:  Met   TREATMENT: Manual therapy, therapeutic exercise, home exercise instruction, and modalities as needed to include: electrical stimulation, ultrasound, moist heat, and ice.    2. The patient has limited strength of the right shoulder.   LTG 2: 12 weeks:  The patient will demonstrate 4 /5 strength for right shoulder flexion, abduction, external rotation, and internal rotation in order to demonstrate improved shoulder stability.    STATUS:  Progressing   STG 2a: 4 weeks:  The patient will demonstrate 3+ /5 strength for right shoulder flexion, abduction, external rotation, and internal rotation.    STATUS:  Met   STG2b:  4 weeks:  The patient will be independent with home exercises.     STATUS:  Progressing   TREATMENT: Manual therapy, therapeutic exercise, home exercise instruction, and modalities as needed to include: electrical stimulation, ultrasound, moist heat, and ice.     3. The patient complains of pain to the right shoulder.   LTG 3: 12 weeks:  The patient will report a pain rating of 1 /10 or better in order to improve sleep quality and tolerance to performance of activities of daily living.    STATUS:  Progressing   STG 3a: 4 weeks:  The patient will report a pain rating of 3 /10 or better.     STATUS:  Progressing   TREATMENT: Manual therapy, therapeutic exercise, home exercise instruction, and modalities as needed to include: electrical stimulation, ultrasound, moist heat, and ice.    4. Carrying, Moving, and Handling Objects Functional  Limitation     LTG 4: 12 weeks:  The patient will demonstrate 20 % limitation by achieving a score of on the QuickDASH.    STATUS:  Met   STG 4a: 4 weeks:  The patient will demonstrate 35 % limitation by achieving a score of on the QuickDASH.      STATUS:  Met  TREATMENT:  Manual therapy, therapeutic exercise, home exercise instruction, and modalities as needed to include: moist heat, electrical stimulation, and ultrasound.    4. The patient reports radicular symptoms in the right upper extremity.   LTG 4: 12 weeks:  The patient will report a decrease in radicular symptoms in the right upper extremity by 75%.    STATUS:  Met   STG 4a: 4 weeks:  The patient will report a decrease in radicular symptoms in the right upper extremity by 50%.    STATUS:  Met   TREATMENT: Manual therapy, therapeutic exercise, home exercise instruction, cervical traction, and modalities as needed to include: moist heat, electrical stimulation, and ultrasound.        Plan  Therapy options: will be seen for skilled therapy services  Planned modality interventions: cryotherapy, dry needling, thermotherapy (hydrocollator packs) and traction  Planned therapy interventions: manual therapy, flexibility, functional ROM exercises, home exercise program, therapeutic activities, stretching, strengthening, soft tissue mobilization, postural training, neuromuscular re-education, ADL retraining and body mechanics training  Frequency: 1x week  Duration in weeks: 4  Treatment plan discussed with: patient  Plan details: Continue to decrease any radicular symptoms, manual work, nerve glides, shoulder/scapualr stabilization.    Will follow up with thoracic doctor on the 14th of June.           Progress per Plan of Care and Progress strengthening /stabilization /functional activity           Timed:  Manual Therapy:    16     mins  42674;  Therapeutic Exercise:    8     mins  91238;     Neuromuscular Herbie:    8    mins  82025;    Therapeutic Activity:           mins  52779;     Gait Training:           mins  34722;     Ultrasound:          mins  74455;    Canalith Repos           ___  mins  44918      Untimed:  Electrical Stimulation:         mins  92125 ( );  Mechanical Traction:         mins  50586;   Dry Needling:                     mins self pay       Timed Treatment:   32   mins   Total Treatment:     34   mins      Gin Power PT, DPT  [place] License #: [id number]

## 2022-06-14 ENCOUNTER — OFFICE VISIT (OUTPATIENT)
Dept: SURGERY | Facility: CLINIC | Age: 37
End: 2022-06-14

## 2022-06-14 VITALS
HEART RATE: 58 BPM | SYSTOLIC BLOOD PRESSURE: 128 MMHG | OXYGEN SATURATION: 99 % | HEIGHT: 66 IN | DIASTOLIC BLOOD PRESSURE: 78 MMHG | WEIGHT: 142 LBS | BODY MASS INDEX: 22.82 KG/M2

## 2022-06-14 DIAGNOSIS — G54.0 TOS (THORACIC OUTLET SYNDROME): Primary | ICD-10-CM

## 2022-06-14 PROCEDURE — 99212 OFFICE O/P EST SF 10 MIN: CPT | Performed by: THORACIC SURGERY (CARDIOTHORACIC VASCULAR SURGERY)

## 2022-06-14 NOTE — PROGRESS NOTES
"Chief Complaint  Neck and arm pain with numbness    Subjective        Juan M Lynch presents to [facility] THORACIC SURGERY  History of Present Illness     Mr. Lynch was seen in the office today for further follow-up of neck and right shoulder pain radiating into the right arm and hand associated with numbness and paresthesias in the right hand.  Patient successfully had his thyroidectomy and removal of nasal polyps.  He did get some relief with the interscalene block.  He is now requesting that we consider resection of his right first rib.  Symptoms in his neck arm and hand are unchanged.    Objective   Vital Signs:  /78 (BP Location: Right arm, Patient Position: Sitting, Cuff Size: Adult)   Pulse 58   Ht 167.6 cm (66\")   Wt 64.4 kg (142 lb)   SpO2 99%   BMI 22.92 kg/m²   Estimated body mass index is 22.92 kg/m² as calculated from the following:    Height as of this encounter: 167.6 cm (66\").    Weight as of this encounter: 64.4 kg (142 lb).    BMI is within normal parameters. No other follow-up for BMI required.      Physical Exam   Constitutional:       Appearance: Normal appearance. He is well-developed.   HENT:      Head: Normocephalic.   Eyes:      General: Lids are normal.      Conjunctiva/sclera: Conjunctivae normal.      Pupils: Pupils are equal, round, and reactive to light.   Neck:      Thyroid: No thyroid mass or thyromegaly.      Vascular: No carotid bruit, hepatojugular reflux or JVD.      Trachea: Trachea normal.      Comments: Full range of motion without pain.  No tenderness in the right or left supraclavicular fossa.  No masses and no adenopathy.  Cardiovascular:      Rate and Rhythm: Normal rate and regular rhythm.  No extrasystoles are present.     Chest Wall: PMI is not displaced.      Pulses: Normal pulses.      Heart sounds: Normal heart sounds, S1 normal and S2 normal.      Comments: Equivocal Adson's maneuver on the right.  Negative Adson's maneuver on the " left.  Pulmonary:      Effort: Pulmonary effort is normal.      Breath sounds: Normal breath sounds.   Abdominal:      General: Bowel sounds are normal.      Palpations: Abdomen is soft. There is no mass.      Tenderness: There is no abdominal tenderness.      Hernia: No hernia is present.   Musculoskeletal:         General: Normal range of motion.      Cervical back: Normal range of motion and neck supple.      Comments: Normal strength in all muscle groups tested in both upper extremities.   is normal bilaterally.  Range of motion in the right shoulder is limited by pain.  Normal range of motion the left shoulder.  Very tender in the right deltopectoral groove.  No tenderness in the left deltopectoral groove.   Skin:     General: Skin is warm and dry.   Neurological:      Mental Status: He is alert and oriented to person, place, and time.      Cranial Nerves: No cranial nerve deficit.      Sensory: No sensory deficit.      Deep Tendon Reflexes: Reflexes are normal and symmetric.      Comments: Touch and fine motor function are intact bilaterally   Psychiatric:         Speech: Speech normal.         Behavior: Behavior normal.         Thought Content: Thought content normal.         Judgment: Judgment normal.  Patient was fully examined today.  I have found no significant changes since his initial exam.  Result Review :  The following data was reviewed by: Manan Gasca III, MD on 06/14/2022:    No new testing for review today.         Assessment and Plan   Diagnoses and all orders for this visit:    1. TOS (thoracic outlet syndrome) (Primary)  -     Case request      I have discussed with the patient and his wife right VAT with right first rib resection.  I have explained the procedure in detail.  We have discussed the risks and benefits.  Alternative forms of therapy and their prognosis were also discussed.  All of his questions were answered to his satisfaction.  He has requested that we proceed with  surgery.    Case request and preoperative orders have been placed in [facility].  Patient will be scheduled for right VAT with right first rib resection at [facility] in the near future.  I will keep you informed of his progress.       I spent 15 minutes caring for Juan M on this date of service. This time includes time spent by me in the following activities:preparing for the visit, reviewing tests, performing a medically appropriate examination and/or evaluation , counseling and educating the patient/family/caregiver, ordering medications, tests, or procedures, referring and communicating with other health care professionals , documenting information in the medical record, independently interpreting results and communicating that information with the patient/family/caregiver and care coordination  Follow Up   Return in about 4 weeks (around 7/12/2022) for Recheck.  Patient was given instructions and counseling regarding his condition or for health maintenance advice. Please see specific information pulled into the AVS if appropriate.

## 2022-06-17 ENCOUNTER — TREATMENT (OUTPATIENT)
Dept: PHYSICAL THERAPY | Facility: CLINIC | Age: 37
End: 2022-06-17

## 2022-06-17 DIAGNOSIS — R29.898 WEAKNESS OF SHOULDER: ICD-10-CM

## 2022-06-17 DIAGNOSIS — M25.511 RIGHT SHOULDER PAIN, UNSPECIFIED CHRONICITY: ICD-10-CM

## 2022-06-17 DIAGNOSIS — R20.2 PARESTHESIAS IN RIGHT HAND: ICD-10-CM

## 2022-06-17 DIAGNOSIS — G54.0 TOS (THORACIC OUTLET SYNDROME): Primary | ICD-10-CM

## 2022-06-17 DIAGNOSIS — M79.601 PAIN OF RIGHT UPPER EXTREMITY: ICD-10-CM

## 2022-06-17 PROCEDURE — PTNOCHG PR CUSTOM PT NO CHARGE VISIT: Performed by: PHYSICAL THERAPIST

## 2022-06-17 NOTE — PROGRESS NOTES
Physical Therapy Daily Treatment Note      Patient: Juan M Lynch   : 1985  Referring practitioner: Manan Gasca III, MD  Date of Initial Visit: Type: THERAPY  Noted: 2022  Today's Date: 2022  Patient seen for 8 sessions           Visit Diagnoses:    ICD-10-CM ICD-9-CM   1. TOS (thoracic outlet syndrome)  G54.0 353.0   2. Pain of right upper extremity  M79.601 729.5   3. Right shoulder pain, unspecified chronicity  M25.511 719.41   4. Weakness of shoulder  R29.898 719.61   5. Paresthesias in right hand  R20.2 782.0       Subjective Evaluation    History of Present Illness  Mechanism of injury: Patient is going to be have his R first rib removed by the thoracic surgeon.  His surgery date is pending.  He is going to hold from PT at this point, will resume with it after the surgery, as needed.      He continues to have pain in the R arm area, numbness and tingling present down the 3/4/5 fingers on the R hand.               Objective   See Exercise, Manual, and Modality Logs for complete treatment.       Assessment & Plan     Assessment    Assessment details: Pt was not treated during the session today, no charges noted.     He is going to be discharged from PT after speaking with him today as he is going to have a thoracic surgery, date unknown, but in the upcoming weeks.     Plan  Plan details: discharge           Timed:  Manual Therapy:         mins  56408;  Therapeutic Exercise:         mins  47750;     Neuromuscular Herbie:        mins  58525;    Therapeutic Activity:          mins  28541;     Gait Training:           mins  86160;     Ultrasound:          mins  07301;    Canalith Repos           ___  mins  21551      Untimed:  Electrical Stimulation:         mins  72591 ( );  Mechanical Traction:         mins  28165;   Dry Needling:                     mins self pay       Timed Treatment:  0    mins   Total Treatment:      10  mins      Gin Power PT, DPT  [place] License #:  [id number]    Discharge Summary  Discharge Summary from Physical Therapy Report    Patient Information  Juan M Lynch  1985  Diagnosis/ICD - 10 Code:  TOS (thoracic outlet syndrome) [G54.0]  Referring practitoner: Manan Gasca III, MD  Date of initial visit: 6/17/2022  Today's date: 6/17/2022  Patient was seen for 8 sessions      Visit Diagnoses:    ICD-10-CM ICD-9-CM   1. TOS (thoracic outlet syndrome)  G54.0 353.0   2. Pain of right upper extremity  M79.601 729.5   3. Right shoulder pain, unspecified chronicity  M25.511 719.41   4. Weakness of shoulder  R29.898 719.61   5. Paresthesias in right hand  R20.2 782.0       Comments:  Patient is being discharged from PT services at this time. He is going to be scheduled for a thoracic surgery to remove the right first rib.           Gin Power, PT, DPT  [place] License #: [id number]

## 2022-06-21 ENCOUNTER — PREP FOR SURGERY (OUTPATIENT)
Dept: OTHER | Facility: HOSPITAL | Age: 37
End: 2022-06-21

## 2022-06-21 DIAGNOSIS — G54.0 TOS (THORACIC OUTLET SYNDROME): Primary | ICD-10-CM

## 2022-06-21 RX ORDER — SODIUM CHLORIDE 0.9 % (FLUSH) 0.9 %
3-10 SYRINGE (ML) INJECTION AS NEEDED
Status: CANCELLED | OUTPATIENT
Start: 2022-08-08

## 2022-06-21 RX ORDER — GABAPENTIN 300 MG/1
600 CAPSULE ORAL ONCE
Status: CANCELLED | OUTPATIENT
Start: 2022-08-08 | End: 2022-06-21

## 2022-06-21 RX ORDER — CELECOXIB 200 MG/1
200 CAPSULE ORAL ONCE
Status: CANCELLED | OUTPATIENT
Start: 2022-08-08 | End: 2022-06-21

## 2022-06-21 RX ORDER — CEFAZOLIN SODIUM 2 G/100ML
2 INJECTION, SOLUTION INTRAVENOUS ONCE
Status: CANCELLED | OUTPATIENT
Start: 2022-08-08 | End: 2022-06-21

## 2022-06-21 RX ORDER — HEPARIN SODIUM 5000 [USP'U]/ML
5000 INJECTION, SOLUTION INTRAVENOUS; SUBCUTANEOUS ONCE
Status: CANCELLED | OUTPATIENT
Start: 2022-08-08 | End: 2022-06-21

## 2022-06-21 RX ORDER — SODIUM CHLORIDE 0.9 % (FLUSH) 0.9 %
3 SYRINGE (ML) INJECTION EVERY 12 HOURS SCHEDULED
Status: CANCELLED | OUTPATIENT
Start: 2022-08-08

## 2022-06-23 ENCOUNTER — LAB (OUTPATIENT)
Dept: LAB | Facility: HOSPITAL | Age: 37
End: 2022-06-23

## 2022-06-23 DIAGNOSIS — D49.7 FOLLICULAR NEOPLASM OF THYROID: ICD-10-CM

## 2022-06-23 DIAGNOSIS — E04.1 THYROID NODULE: Primary | ICD-10-CM

## 2022-06-23 DIAGNOSIS — G54.0 TOS (THORACIC OUTLET SYNDROME): ICD-10-CM

## 2022-06-23 LAB
ANION GAP SERPL CALCULATED.3IONS-SCNC: 10 MMOL/L (ref 5–15)
BASOPHILS # BLD AUTO: 0.03 10*3/MM3 (ref 0–0.2)
BASOPHILS NFR BLD AUTO: 0.5 % (ref 0–1.5)
BUN SERPL-MCNC: 11 MG/DL (ref 6–20)
BUN/CREAT SERPL: 11 (ref 7–25)
CALCIUM SPEC-SCNC: 8.6 MG/DL (ref 8.6–10.5)
CHLORIDE SERPL-SCNC: 102 MMOL/L (ref 98–107)
CO2 SERPL-SCNC: 26 MMOL/L (ref 22–29)
CREAT SERPL-MCNC: 1 MG/DL (ref 0.76–1.27)
DEPRECATED RDW RBC AUTO: 46 FL (ref 37–54)
EGFRCR SERPLBLD CKD-EPI 2021: 98.8 ML/MIN/1.73
EOSINOPHIL # BLD AUTO: 0.1 10*3/MM3 (ref 0–0.4)
EOSINOPHIL NFR BLD AUTO: 1.8 % (ref 0.3–6.2)
ERYTHROCYTE [DISTWIDTH] IN BLOOD BY AUTOMATED COUNT: 13.4 % (ref 12.3–15.4)
GLUCOSE SERPL-MCNC: 86 MG/DL (ref 65–99)
HCT VFR BLD AUTO: 46.4 % (ref 37.5–51)
HGB BLD-MCNC: 15.2 G/DL (ref 13–17.7)
IMM GRANULOCYTES # BLD AUTO: 0.01 10*3/MM3 (ref 0–0.05)
IMM GRANULOCYTES NFR BLD AUTO: 0.2 % (ref 0–0.5)
INR PPP: 0.97 (ref 0.86–1.15)
LYMPHOCYTES # BLD AUTO: 1.79 10*3/MM3 (ref 0.7–3.1)
LYMPHOCYTES NFR BLD AUTO: 31.5 % (ref 19.6–45.3)
MCH RBC QN AUTO: 30 PG (ref 26.6–33)
MCHC RBC AUTO-ENTMCNC: 32.8 G/DL (ref 31.5–35.7)
MCV RBC AUTO: 91.5 FL (ref 79–97)
MONOCYTES # BLD AUTO: 0.5 10*3/MM3 (ref 0.1–0.9)
MONOCYTES NFR BLD AUTO: 8.8 % (ref 5–12)
NEUTROPHILS NFR BLD AUTO: 3.26 10*3/MM3 (ref 1.7–7)
NEUTROPHILS NFR BLD AUTO: 57.2 % (ref 42.7–76)
PLATELET # BLD AUTO: 213 10*3/MM3 (ref 140–450)
PMV BLD AUTO: 9.6 FL (ref 6–12)
POTASSIUM SERPL-SCNC: 3.9 MMOL/L (ref 3.5–5.2)
PROTHROMBIN TIME: 12.9 SECONDS (ref 11.8–14.9)
RBC # BLD AUTO: 5.07 10*6/MM3 (ref 4.14–5.8)
SODIUM SERPL-SCNC: 138 MMOL/L (ref 136–145)
T4 FREE SERPL-MCNC: 0.97 NG/DL (ref 0.93–1.7)
TSH SERPL DL<=0.05 MIU/L-ACNC: 5.2 UIU/ML (ref 0.27–4.2)
WBC NRBC COR # BLD: 5.69 10*3/MM3 (ref 3.4–10.8)

## 2022-06-23 PROCEDURE — 36415 COLL VENOUS BLD VENIPUNCTURE: CPT

## 2022-06-23 PROCEDURE — 84443 ASSAY THYROID STIM HORMONE: CPT

## 2022-06-23 PROCEDURE — 84439 ASSAY OF FREE THYROXINE: CPT

## 2022-06-23 PROCEDURE — 85610 PROTHROMBIN TIME: CPT

## 2022-06-23 PROCEDURE — 85025 COMPLETE CBC W/AUTO DIFF WBC: CPT

## 2022-06-23 PROCEDURE — 80048 BASIC METABOLIC PNL TOTAL CA: CPT

## 2022-06-29 ENCOUNTER — OFFICE VISIT (OUTPATIENT)
Dept: OTOLARYNGOLOGY | Facility: CLINIC | Age: 37
End: 2022-06-29

## 2022-06-29 VITALS — TEMPERATURE: 98 F | WEIGHT: 142 LBS | BODY MASS INDEX: 22.82 KG/M2 | HEIGHT: 66 IN

## 2022-06-29 DIAGNOSIS — E89.0 POSTOPERATIVE HYPOTHYROIDISM: Primary | ICD-10-CM

## 2022-06-29 DIAGNOSIS — E04.1 THYROID NODULE: ICD-10-CM

## 2022-06-29 PROCEDURE — 99024 POSTOP FOLLOW-UP VISIT: CPT | Performed by: OTOLARYNGOLOGY

## 2022-06-29 RX ORDER — LEVOTHYROXINE SODIUM 50 MCG
50 TABLET ORAL DAILY
Qty: 30 TABLET | Refills: 3 | Status: SHIPPED | OUTPATIENT
Start: 2022-06-29 | End: 2022-11-23

## 2022-06-30 PROBLEM — E89.0 POSTOPERATIVE HYPOTHYROIDISM: Status: ACTIVE | Noted: 2022-06-30

## 2022-06-30 NOTE — PROGRESS NOTES
Patient Name: Juan M Lynch   Visit Date: 06/29/2022   Patient ID: [id number]  Provider: Reji Beebe MD    Sex: male  Location: [facility] [place] [facility]   YOB: 1985  Location Address: [address] [place] [address], [address],?[place]?[address]    Primary Care Provider Zoila Mckinney APRN  Location Phone: [phone]    Referring Provider: No ref. provider found        Chief Complaint  Follow-up (Follow up labs)    Subjective    History of Present Illness  Juan M Lynch is a 37 y.o. male who presents to [facility] today as a consult from No ref. provider found.    He presents to the clinic today for follow-up regarding his thyroid.  Recent blood work reveals his TSH to be elevated at 5.2 with free T4 to be 0.97, lower end of normal.  He informs me that he has felt fatigued and kind of rundown.  He has had no difficulty with the surgical site, denies any throat pain, voice changes, difficulty or pain with swallowing.  He is tolerating a full diet without any issues.  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   • Thoracic outlet syndrome    • Thyroid cancer (HCC)    • Thyroid nodule        Past Surgical History:   Procedure Laterality Date   • ELBOW PROCEDURE     • NASAL ENDOSCOPY N/A 5/16/2022    Procedure: NASAL ENDOSCOPY;  Surgeon: Reji Beebe MD;  Location: [facility] [place];  Service: ENT;  Laterality: N/A;   • THYROIDECTOMY Right 5/16/2022    Procedure: Right THYROIDECTOMY, excision of nasal polyps;  Surgeon: Reji Beebe MD;  Location: [facility];  Service: ENT;  Laterality: Right;   • VASECTOMY           Current Outpatient Medications:   •  aspirin-acetaminophen-caffeine (EXCEDRIN MIGRAINE) 250-250-65 MG per tablet, Take 1 tablet by mouth Every 6 (Six) Hours As Needed for Headache., Disp: , Rfl:   •   "ibuprofen (ADVIL,MOTRIN) 200 MG tablet, Take 200 mg by mouth Every 6 (Six) Hours As Needed for Mild Pain ., Disp: , Rfl:   •  cephalexin (Keflex) 250 MG capsule, Take 1 capsule by mouth 3 (Three) Times a Day., Disp: 7 capsule, Rfl: 0  •  HYDROcodone-acetaminophen (NORCO) 5-325 MG per tablet, Take 1-2 tablets by mouth Every 6 (Six) Hours As Needed for Moderate Pain ., Disp: 30 tablet, Rfl: 0  •  Synthroid 50 MCG tablet, Take 1 tablet by mouth Daily., Disp: 30 tablet, Rfl: 3     Allergies   Allergen Reactions   • Guaifenesin Anaphylaxis   • Robitussin Severe Cgh-Sr Thrt [Acetaminophen-Dm] Anaphylaxis       Family History   Problem Relation Age of Onset   • No Known Problems Mother    • No Known Problems Father    • Malig Hyperthermia Neg Hx         Social History     Social History Narrative   • Not on file       Objective     Vital Signs:   Temp 98 °F (36.7 °C)   Ht 167.6 cm (65.98\")   Wt 64.4 kg (142 lb)   BMI 22.93 kg/m²       Physical Exam         Constitutional   Appearance  · : well developed, well-nourished, alert and in no acute distress, voice clear and strong    Head  Inspection  · : no deformities or lesions  Face  Inspection  · : No facial lesions; House-Brackmann I/VI bilaterally  Palpation  · : No TMJ crepitus nor  muscle tenderness bilaterally    Eyes  Vision  Visual Fields  · : Extraocular movements are intact. No spontaneous or gaze-induced nystagmus.  Conjunctivae  · : clear  Sclerae  · : clear  Pupils and Irises  · : pupils equal, round, and reactive to light.     Ears, Nose, Mouth and Throat    Ears    External Ears  · : appearance within normal limits, no lesions present  Otoscopic Examination  · : Tympanic membrane appearance within normal limits bilaterally without perforations, well-aerated middle ears  Hearing  · : intact to conversational voice both ears  Tunning fork testing:     :    Nose    External Nose  · : appearance normal  Intranasal Exam  · : mucosa within normal limits, " vestibules normal, no intranasal lesions present, septum midline, sinuses non tender to percussion  Oral Cavity    Oral Mucosa  · : oral mucosa normal without pallor or cyanosis  Lips  · : lip appearance normal  Teeth  · : normal dentition for age  Gums  · : gums pink, non-swollen, no bleeding present  Tongue  · : tongue appearance normal; normal mobility  Palate  · : hard palate normal, soft palate appearance normal with symmetric mobility    Throat    Oropharynx  · : no inflammation or lesions present, tonsils within normal limits  Hypopharynx  · : appearance within normal limits, superior epiglottis within normal limits  Larynx  · : appearance within normal limits, vocal cords within normal limits, no lesions present    Neck  Inspection/Palpation  · : Incision site is clean dry and intact, well-healing, normal appearance, no masses or tenderness, trachea midline; thyroid size normal, nontender, no nodules or masses present on palpation    Respiratory  Respiratory Effort  · : breathing unlabored  Inspection of Chest  · : normal appearance, no retractions    Cardiovascular  Heart  · : regular rate and rhythm    Lymphatic  Neck  · : no lymphadenopathy present  Supraclavicular Nodes  · : no lymphadenopathy present  Preauricular Nodes  · : no lymphadenopathy present    Skin and Subcutaneous Tissue  General Inspection  · : Regarding face and neck - there are no rashes present, no lesions present, and no areas of discoloration    Neurologic  Cranial Nerves  · : cranial nerves II-XII are grossly intact bilaterally  Gait and Station  · : normal gait, able to stand without diffculty    Psychiatric  Judgement and Insight  · : judgment and insight intact  Mood and Affect  · : mood normal, affect appropriate          Assessment and Plan    Diagnoses and all orders for this visit:    1. Postoperative hypothyroidism (Primary)  -     Synthroid 50 MCG tablet; Take 1 tablet by mouth Daily.  Dispense: 30 tablet; Refill: 3  -      TSH+Free T4; Future    2. Thyroid nodule    Examination today revealed well-healing surgical site.  I discussed the thyroid blood work with him today and did prescribe Synthroid due to postoperative hypothyroidism.  I have ordered his labs to be rechecked in 2 to 3 months, and we will discuss how he responds to treatment.  His dose may be adjusted.  I will plan to see him back in the clinic in about 3 to 6 months, or sooner should he have any issues.    Follow Up   No follow-ups on file.  Patient was given instructions and counseling regarding his condition or for health maintenance advice. Please see specific information pulled into the AVS if appropriate.

## 2022-07-29 ENCOUNTER — OFFICE VISIT (OUTPATIENT)
Dept: ORTHOPEDIC SURGERY | Facility: CLINIC | Age: 37
End: 2022-07-29

## 2022-07-29 VITALS — WEIGHT: 141.2 LBS | HEIGHT: 65 IN | BODY MASS INDEX: 23.53 KG/M2 | TEMPERATURE: 97.9 F

## 2022-07-29 DIAGNOSIS — G54.0 TOS (THORACIC OUTLET SYNDROME): Primary | ICD-10-CM

## 2022-07-29 DIAGNOSIS — M89.511 OSTEOLYSIS OF ACROMIAL END OF RIGHT CLAVICLE: ICD-10-CM

## 2022-07-29 DIAGNOSIS — R20.2 PARESTHESIAS IN RIGHT HAND: ICD-10-CM

## 2022-07-29 PROCEDURE — 99213 OFFICE O/P EST LOW 20 MIN: CPT | Performed by: PHYSICIAN ASSISTANT

## 2022-08-01 ENCOUNTER — PRE-ADMISSION TESTING (OUTPATIENT)
Dept: PREADMISSION TESTING | Facility: HOSPITAL | Age: 37
End: 2022-08-01

## 2022-08-01 VITALS
TEMPERATURE: 98.9 F | HEART RATE: 80 BPM | OXYGEN SATURATION: 100 % | WEIGHT: 143.6 LBS | BODY MASS INDEX: 24.52 KG/M2 | SYSTOLIC BLOOD PRESSURE: 136 MMHG | RESPIRATION RATE: 16 BRPM | DIASTOLIC BLOOD PRESSURE: 96 MMHG | HEIGHT: 64 IN

## 2022-08-01 DIAGNOSIS — G54.0 TOS (THORACIC OUTLET SYNDROME): ICD-10-CM

## 2022-08-01 LAB
ABO GROUP BLD: NORMAL
ANION GAP SERPL CALCULATED.3IONS-SCNC: 10 MMOL/L (ref 5–15)
BLD GP AB SCN SERPL QL: NEGATIVE
BUN SERPL-MCNC: 15 MG/DL (ref 6–20)
BUN/CREAT SERPL: 17 (ref 7–25)
CALCIUM SPEC-SCNC: 8.9 MG/DL (ref 8.6–10.5)
CHLORIDE SERPL-SCNC: 105 MMOL/L (ref 98–107)
CO2 SERPL-SCNC: 25 MMOL/L (ref 22–29)
CREAT SERPL-MCNC: 0.88 MG/DL (ref 0.76–1.27)
DEPRECATED RDW RBC AUTO: 47.3 FL (ref 37–54)
EGFRCR SERPLBLD CKD-EPI 2021: 112.2 ML/MIN/1.73
ERYTHROCYTE [DISTWIDTH] IN BLOOD BY AUTOMATED COUNT: 13.9 % (ref 12.3–15.4)
GLUCOSE SERPL-MCNC: 96 MG/DL (ref 65–99)
HCT VFR BLD AUTO: 44.6 % (ref 37.5–51)
HGB BLD-MCNC: 14.6 G/DL (ref 13–17.7)
MCH RBC QN AUTO: 30.1 PG (ref 26.6–33)
MCHC RBC AUTO-ENTMCNC: 32.7 G/DL (ref 31.5–35.7)
MCV RBC AUTO: 92 FL (ref 79–97)
PLATELET # BLD AUTO: 215 10*3/MM3 (ref 140–450)
PMV BLD AUTO: 9.9 FL (ref 6–12)
POTASSIUM SERPL-SCNC: 4.1 MMOL/L (ref 3.5–5.2)
RBC # BLD AUTO: 4.85 10*6/MM3 (ref 4.14–5.8)
RH BLD: NEGATIVE
SODIUM SERPL-SCNC: 140 MMOL/L (ref 136–145)
T&S EXPIRATION DATE: NORMAL
WBC NRBC COR # BLD: 5.08 10*3/MM3 (ref 3.4–10.8)

## 2022-08-01 PROCEDURE — 86901 BLOOD TYPING SEROLOGIC RH(D): CPT

## 2022-08-01 PROCEDURE — 36415 COLL VENOUS BLD VENIPUNCTURE: CPT

## 2022-08-01 PROCEDURE — 85027 COMPLETE CBC AUTOMATED: CPT

## 2022-08-01 PROCEDURE — 86850 RBC ANTIBODY SCREEN: CPT

## 2022-08-01 PROCEDURE — 80048 BASIC METABOLIC PNL TOTAL CA: CPT

## 2022-08-01 PROCEDURE — 86900 BLOOD TYPING SEROLOGIC ABO: CPT

## 2022-08-01 NOTE — DISCHARGE INSTRUCTIONS
Take the following medications the morning of surgery: SYNTHROID    ARRIVAL TIME : 530AM    General Instructions:  Do not eat solid food after midnight the night before surgery.  You may drink clear liquids day of surgery but must stop at least one hour before your hospital arrival time.  It is beneficial for you to have a clear drink that contains carbohydrates the day of surgery.  We suggest a 12 to 20 ounce bottle of Gatorade or Powerade for non-diabetic patients or a 12 to 20 ounce bottle of G2 or Powerade Zero for diabetic patients. (Pediatric patients, are not advised to drink a 12 to 20 ounce carbohydrate drink)    Clear liquids are liquids you can see through.  Nothing red in color.     Plain water                               Sports drinks  Sodas                                   Gelatin (Jell-O)  Fruit juices without pulp such as white grape juice and apple juice  Popsicles that contain no fruit or yogurt  Tea or coffee (no cream or milk added)  Gatorade / Powerade  G2 / Powerade Zero    Patients who avoid smoking, chewing tobacco and alcohol for 4 weeks prior to surgery have a reduced risk of post-operative complications.  Quit smoking as many days before surgery as you can.  Do not smoke, use chewing tobacco or drink alcohol the day of surgery.  Bring any papers given to you in the doctor’s office.  Wear clean comfortable clothes.  Do not wear contact lenses, false eyelashes or make-up.  Bring a case for your glasses.   Remove all piercings.  Leave jewelry and any other valuables at home.  The Pre-Admission Testing nurse will instruct you to bring medications if unable to obtain an accurate list in Pre-Admission Testing.        ***********If you were given a blood bank ID arm band remember to bring it with you the day of surgery.    Preventing a Surgical Site Infection:  For 2 to 3 days before surgery, avoid shaving with a razor because the razor can irritate skin and make it easier to develop an  infection.    Any areas of open skin can increase the risk of a post-operative wound infection by allowing bacteria to enter and travel throughout the body.  Notify your surgeon if you have any skin wounds / rashes even if it is not near the expected surgical site.  The area will need assessed to determine if surgery should be delayed until it is healed.  The night prior to surgery shower using a fresh bar of anti-bacterial soap (such as Dial) and clean washcloth.  Sleep in a clean bed with clean clothing.  Do not allow pets to sleep with you.  Shower on the morning of surgery using a fresh bar of anti-bacterial soap (such as Dial) and clean washcloth.  Dry with a clean towel and dress in clean clothing.  Ask your surgeon if you will be receiving antibiotics prior to surgery.  Make sure you, your family, and all healthcare providers clean their hands with soap and water or an alcohol based hand  before caring for you or your wound.    Day of surgery:  Your arrival time is approximately two hours before your scheduled surgery time.  Upon arrival, a Pre-op nurse and Anesthesiologist will review your health history, obtain vital signs, and answer questions you may have.  The only belongings needed at this time will be a list of your home medications and if applicable your C-PAP/BI-PAP machine.  A Pre-op nurse will start an IV and you may receive medication in preparation for surgery, including something to help you relax.     Please be aware that surgery does come with discomfort.  We want to make every effort to control your discomfort so please discuss any uncontrolled symptoms with your nurse.   Your doctor will most likely have prescribed pain medications.      If you are going home after surgery you will receive individualized written care instructions before being discharged.  A responsible adult must drive you to and from the hospital on the day of your surgery and stay with you for 24 hours.  Discharge  prescriptions can be filled by the hospital pharmacy during regular pharmacy hours.  If you are having surgery late in the day/evening your prescription may be e-prescribed to your pharmacy.  Please verify your pharmacy hours or chose a 24 hour pharmacy to avoid not having access to your prescription because your pharmacy has closed for the day.    If you are staying overnight following surgery, you will be transported to your hospital room following the recovery period.  [facility] has all private rooms.    If you have any questions please call Pre-Admission Testing at [phone].  Deductibles and co-payments are collected on the day of service. Please be prepared to pay the required co-pay, deductible or deposit on the day of service as defined by your plan.    Patient Education for Self-Quarantine Process    Following your COVID testing, we strongly recommend that you wear a mask when you are with other people and practice social distancing.   Limit your activities to only required outings.  Wash your hands with soap and water frequently for at least 20 seconds.   Avoid touching your eyes, nose and mouth with unwashed hands.  Do not share anything - utensils, drinking glasses, food from the same bowl.   Sanitize household surfaces daily. Include all high touch areas (door handles, light switches, phones, countertops, etc.)    Call your surgeon immediately if you experience any of the following symptoms:  Sore Throat  Shortness of Breath or difficulty breathing  Cough  Chills  Body soreness or muscle pain  Headache  Fever  New loss of taste or smell  Do not arrive for your surgery ill.  Your procedure will need to be rescheduled to another time.  You will need to call your physician before the day of surgery to avoid any unnecessary exposure to hospital staff as well as other patients.       CHLORHEXIDINE CLOTH INSTRUCTIONS  The morning of surgery follow these instructions using the Chlorhexidine  cloths you've been given.  These steps reduce bacteria on the body.  Do not use the cloths near your eyes, ears mouth, genitalia or on open wounds.  Throw the cloths away after use but do not try to flush them down a toilet.      Open and remove one cloth at a time from the package.    Leave the cloth unfolded and begin the bathing.  Massage the skin with the cloths using gentle pressure to remove bacteria.  Do not scrub harshly.   Follow the steps below with one 2% CHG cloth per area (6 total cloths).  One cloth for neck, shoulders and chest.  One cloth for both arms, hands, fingers and underarms (do underarms last).  One cloth for the abdomen followed by groin.  One cloth for right leg and foot including between the toes.  One cloth for left leg and foot including between the toes.  The last cloth is to be used for the back of the neck, back and buttocks.    Allow the CHG to air dry 3 minutes on the skin which will give it time to work and decrease the chance of irritation.  The skin may feel sticky until it is dry.  Do not rinse with water or any other liquid or you will lose the beneficial effects of the CHG.  If mild skin irritation occurs, do rinse the skin to remove the CHG.  Report this to the nurse at time of admission.  Do not apply lotions, creams, ointments, deodorants or perfumes after using the clothes. Dress in clean clothes before coming to the hospital.

## 2022-08-05 ENCOUNTER — TRANSCRIBE ORDERS (OUTPATIENT)
Dept: FAMILY MEDICINE CLINIC | Age: 37
End: 2022-08-05

## 2022-08-05 ENCOUNTER — APPOINTMENT (OUTPATIENT)
Dept: LAB | Facility: HOSPITAL | Age: 37
End: 2022-08-05

## 2022-08-05 ENCOUNTER — LAB (OUTPATIENT)
Dept: LAB | Facility: HOSPITAL | Age: 37
End: 2022-08-05

## 2022-08-05 ENCOUNTER — OFFICE VISIT (OUTPATIENT)
Dept: UROLOGY | Facility: CLINIC | Age: 37
End: 2022-08-05

## 2022-08-05 VITALS — HEIGHT: 65 IN | WEIGHT: 143 LBS | RESPIRATION RATE: 16 BRPM | BODY MASS INDEX: 23.82 KG/M2

## 2022-08-05 DIAGNOSIS — N50.89 SCROTAL MASS: Primary | ICD-10-CM

## 2022-08-05 PROBLEM — Z30.09 STERILIZATION CONSULT: Status: ACTIVE | Noted: 2022-08-05

## 2022-08-05 LAB — SARS-COV-2 RNA PNL SPEC NAA+PROBE: NOT DETECTED

## 2022-08-05 PROCEDURE — U0004 COV-19 TEST NON-CDC HGH THRU: HCPCS

## 2022-08-05 PROCEDURE — 99203 OFFICE O/P NEW LOW 30 MIN: CPT | Performed by: UROLOGY

## 2022-08-05 NOTE — PROGRESS NOTES
Chief Complaint: Urologic complaint    Subjective         History of Present Illness  Juan M Lynch is a 37 y.o. male       Scrotal mass    Vasectomy 2005      Patient comes in today wanting his semen checked and also wanting me to check on a lesion/mass  middle scrotum.    Not painful.  Has gotten just a little bigger.    Voiding without issue.     No gross hematuria    No history of kidney  stone.    No urologic family history    No cardiopulmonary history.  Non-smoker.  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 [place];  Service: ENT;  Laterality: N/A;   • THYROIDECTOMY Right 5/16/2022    Procedure: Right THYROIDECTOMY, excision of nasal polyps;  Surgeon: Reji Beebe MD;  Location: [facility] [place];  Service: ENT;  Laterality: Right;   • VASECTOMY           Current Outpatient Medications:   •  aspirin-acetaminophen-caffeine (EXCEDRIN MIGRAINE) 250-250-65 MG per tablet, Take 1 tablet by mouth Every 6 (Six) Hours As Needed for Headache. HOLD FOR OR, Disp: , Rfl:   •  Synthroid 50 MCG tablet, Take 1 tablet by mouth Daily., Disp: 30 tablet, Rfl: 3    Allergies   Allergen Reactions   • Robitussin Severe Cgh-Sr Thrt [Acetaminophen-Dm] Anaphylaxis        Family History   Problem Relation Age of Onset   • No Known Problems Mother    • No Known Problems Father    • Malig Hyperthermia Neg Hx        Social History     Socioeconomic History   • Marital status: Single   • Number of children: 2   Tobacco Use   • Smoking status: Never Smoker   • Smokeless tobacco: Never Used   Vaping Use   • Vaping Use: Never  "used   Substance and Sexual Activity   • Alcohol use: Yes     Comment: DAILY- BEER/LIQUOR   • Drug use: Yes     Types: Marijuana     Comment: \"SOME\"   • Sexual activity: Defer       Vital Signs:   Resp 16   Ht 165.1 cm (65\")   Wt 64.9 kg (143 lb)   BMI 23.80 kg/m²      Physical exam    Alert and orient x3  Well appearing, well developed, in no acute distress   Unlabored respirations  Nontender/nondistended    Uncircumcised phallus, normal    Bilateral descended testicles without mass or tenderness    Grossly oriented to person, place and time, judgment is intact, normal mood and affect    Results for orders placed or performed in visit on 08/01/22   Basic Metabolic Panel    Specimen: Blood   Result Value Ref Range    Glucose 96 65 - 99 mg/dL    BUN 15 6 - 20 mg/dL    Creatinine 0.88 0.76 - 1.27 mg/dL    Sodium 140 136 - 145 mmol/L    Potassium 4.1 3.5 - 5.2 mmol/L    Chloride 105 98 - 107 mmol/L    CO2 25.0 22.0 - 29.0 mmol/L    Calcium 8.9 8.6 - 10.5 mg/dL    BUN/Creatinine Ratio 17.0 7.0 - 25.0    Anion Gap 10.0 5.0 - 15.0 mmol/L    eGFR 112.2 >60.0 mL/min/1.73   CBC (No Diff)    Specimen: Blood   Result Value Ref Range    WBC 5.08 3.40 - 10.80 10*3/mm3    RBC 4.85 4.14 - 5.80 10*6/mm3    Hemoglobin 14.6 13.0 - 17.7 g/dL    Hematocrit 44.6 37.5 - 51.0 %    MCV 92.0 79.0 - 97.0 fL    MCH 30.1 26.6 - 33.0 pg    MCHC 32.7 31.5 - 35.7 g/dL    RDW 13.9 12.3 - 15.4 %    RDW-SD 47.3 37.0 - 54.0 fl    MPV 9.9 6.0 - 12.0 fL    Platelets 215 140 - 450 10*3/mm3   Type and screen    Specimen: Blood   Result Value Ref Range    ABO Type A     RH type Negative     Antibody Screen Negative     T&S Expiration Date 8/15/2022 11:59:00 PM              Assessment and Plan    Diagnoses and all orders for this visit:    1. Scrotal mass (Primary)         Exam normal today, patient given reassurance.  Normal variant after vasectomy    Patient would like his semen checked.  Going to have him drop off a specimen in a few days to check " for him.    F/u as needed.

## 2022-08-07 ENCOUNTER — ANESTHESIA EVENT (OUTPATIENT)
Dept: PERIOP | Facility: HOSPITAL | Age: 37
End: 2022-08-07

## 2022-08-08 ENCOUNTER — APPOINTMENT (OUTPATIENT)
Dept: GENERAL RADIOLOGY | Facility: HOSPITAL | Age: 37
End: 2022-08-08

## 2022-08-08 ENCOUNTER — HOSPITAL ENCOUNTER (INPATIENT)
Facility: HOSPITAL | Age: 37
LOS: 1 days | Discharge: HOME | End: 2022-08-09
Attending: THORACIC SURGERY (CARDIOTHORACIC VASCULAR SURGERY) | Admitting: THORACIC SURGERY (CARDIOTHORACIC VASCULAR SURGERY)

## 2022-08-08 ENCOUNTER — ANESTHESIA (OUTPATIENT)
Dept: PERIOP | Facility: HOSPITAL | Age: 37
End: 2022-08-08

## 2022-08-08 DIAGNOSIS — G54.0 TOS (THORACIC OUTLET SYNDROME): Primary | ICD-10-CM

## 2022-08-08 LAB
LAB AP CASE REPORT: NORMAL
PATH REPORT.FINAL DX SPEC: NORMAL
PATH REPORT.GROSS SPEC: NORMAL

## 2022-08-08 PROCEDURE — 25010000002 MIDAZOLAM PER 1 MG: Performed by: ANESTHESIOLOGY

## 2022-08-08 PROCEDURE — 25010000002 FENTANYL CITRATE (PF) 50 MCG/ML SOLUTION: Performed by: ANESTHESIOLOGY

## 2022-08-08 PROCEDURE — 76942 ECHO GUIDE FOR BIOPSY: CPT | Performed by: THORACIC SURGERY (CARDIOTHORACIC VASCULAR SURGERY)

## 2022-08-08 PROCEDURE — 25010000002 CEFAZOLIN IN DEXTROSE 2-4 GM/100ML-% SOLUTION: Performed by: THORACIC SURGERY (CARDIOTHORACIC VASCULAR SURGERY)

## 2022-08-08 PROCEDURE — 25010000002 HYDROMORPHONE PER 4 MG: Performed by: THORACIC SURGERY (CARDIOTHORACIC VASCULAR SURGERY)

## 2022-08-08 PROCEDURE — 71045 X-RAY EXAM CHEST 1 VIEW: CPT

## 2022-08-08 PROCEDURE — 21615 EXCISION 1ST &/CERVICAL RIB: CPT | Performed by: THORACIC SURGERY (CARDIOTHORACIC VASCULAR SURGERY)

## 2022-08-08 PROCEDURE — 3E0T3BZ INTRODUCTION OF ANESTHETIC AGENT INTO PERIPHERAL NERVES AND PLEXI, PERCUTANEOUS APPROACH: ICD-10-PCS | Performed by: THORACIC SURGERY (CARDIOTHORACIC VASCULAR SURGERY)

## 2022-08-08 PROCEDURE — 25010000002 MAGNESIUM SULFATE PER 500 MG OF MAGNESIUM: Performed by: NURSE ANESTHETIST, CERTIFIED REGISTERED

## 2022-08-08 PROCEDURE — 25010000002 DEXAMETHASONE PER 1 MG: Performed by: NURSE ANESTHETIST, CERTIFIED REGISTERED

## 2022-08-08 PROCEDURE — 25010000002 PROPOFOL 10 MG/ML EMULSION: Performed by: NURSE ANESTHETIST, CERTIFIED REGISTERED

## 2022-08-08 PROCEDURE — 01N34ZZ RELEASE BRACHIAL PLEXUS, PERCUTANEOUS ENDOSCOPIC APPROACH: ICD-10-PCS | Performed by: THORACIC SURGERY (CARDIOTHORACIC VASCULAR SURGERY)

## 2022-08-08 PROCEDURE — 0BJ08ZZ INSPECTION OF TRACHEOBRONCHIAL TREE, VIA NATURAL OR ARTIFICIAL OPENING ENDOSCOPIC: ICD-10-PCS | Performed by: THORACIC SURGERY (CARDIOTHORACIC VASCULAR SURGERY)

## 2022-08-08 PROCEDURE — 21615 EXCISION 1ST &/CERVICAL RIB: CPT | Performed by: REGISTERED NURSE

## 2022-08-08 PROCEDURE — C1729 CATH, DRAINAGE: HCPCS | Performed by: THORACIC SURGERY (CARDIOTHORACIC VASCULAR SURGERY)

## 2022-08-08 PROCEDURE — C9290 INJ, BUPIVACAINE LIPOSOME: HCPCS | Performed by: ANESTHESIOLOGY

## 2022-08-08 PROCEDURE — 88300 SURGICAL PATH GROSS: CPT | Performed by: THORACIC SURGERY (CARDIOTHORACIC VASCULAR SURGERY)

## 2022-08-08 PROCEDURE — 0 BUPIVACAINE LIPOSOME 1.3 % SUSPENSION: Performed by: ANESTHESIOLOGY

## 2022-08-08 PROCEDURE — 25010000002 KETOROLAC TROMETHAMINE PER 15 MG: Performed by: THORACIC SURGERY (CARDIOTHORACIC VASCULAR SURGERY)

## 2022-08-08 PROCEDURE — 25010000002 HYDROMORPHONE PER 4 MG: Performed by: NURSE ANESTHETIST, CERTIFIED REGISTERED

## 2022-08-08 PROCEDURE — 25010000002 ONDANSETRON PER 1 MG: Performed by: NURSE ANESTHETIST, CERTIFIED REGISTERED

## 2022-08-08 PROCEDURE — S0260 H&P FOR SURGERY: HCPCS | Performed by: THORACIC SURGERY (CARDIOTHORACIC VASCULAR SURGERY)

## 2022-08-08 PROCEDURE — 25010000002 HEPARIN (PORCINE) PER 1000 UNITS: Performed by: THORACIC SURGERY (CARDIOTHORACIC VASCULAR SURGERY)

## 2022-08-08 RX ORDER — NALOXONE HCL 0.4 MG/ML
0.4 VIAL (ML) INJECTION
Status: DISCONTINUED | OUTPATIENT
Start: 2022-08-08 | End: 2022-08-08 | Stop reason: HOSPADM

## 2022-08-08 RX ORDER — SODIUM CHLORIDE 0.9 % (FLUSH) 0.9 %
3-10 SYRINGE (ML) INJECTION AS NEEDED
Status: DISCONTINUED | OUTPATIENT
Start: 2022-08-08 | End: 2022-08-08 | Stop reason: HOSPADM

## 2022-08-08 RX ORDER — SODIUM CHLORIDE 0.9 % (FLUSH) 0.9 %
3 SYRINGE (ML) INJECTION EVERY 12 HOURS SCHEDULED
Status: DISCONTINUED | OUTPATIENT
Start: 2022-08-08 | End: 2022-08-08 | Stop reason: HOSPADM

## 2022-08-08 RX ORDER — OXYCODONE HYDROCHLORIDE 5 MG/1
5 TABLET ORAL ONCE AS NEEDED
Status: COMPLETED | OUTPATIENT
Start: 2022-08-08 | End: 2022-08-08

## 2022-08-08 RX ORDER — GABAPENTIN 300 MG/1
600 CAPSULE ORAL ONCE
Status: COMPLETED | OUTPATIENT
Start: 2022-08-08 | End: 2022-08-08

## 2022-08-08 RX ORDER — GABAPENTIN 300 MG/1
300 CAPSULE ORAL EVERY 8 HOURS SCHEDULED
Status: DISCONTINUED | OUTPATIENT
Start: 2022-08-08 | End: 2022-08-09 | Stop reason: HOSPADM

## 2022-08-08 RX ORDER — SCOLOPAMINE TRANSDERMAL SYSTEM 1 MG/1
1 PATCH, EXTENDED RELEASE TRANSDERMAL ONCE
Status: DISCONTINUED | OUTPATIENT
Start: 2022-08-08 | End: 2022-08-08

## 2022-08-08 RX ORDER — CEFAZOLIN SODIUM 2 G/100ML
2 INJECTION, SOLUTION INTRAVENOUS EVERY 8 HOURS
Status: COMPLETED | OUTPATIENT
Start: 2022-08-08 | End: 2022-08-09

## 2022-08-08 RX ORDER — PROPOFOL 10 MG/ML
VIAL (ML) INTRAVENOUS AS NEEDED
Status: DISCONTINUED | OUTPATIENT
Start: 2022-08-08 | End: 2022-08-08 | Stop reason: SURG

## 2022-08-08 RX ORDER — KETAMINE HYDROCHLORIDE 50 MG/ML
INJECTION, SOLUTION, CONCENTRATE INTRAMUSCULAR; INTRAVENOUS AS NEEDED
Status: DISCONTINUED | OUTPATIENT
Start: 2022-08-08 | End: 2022-08-08 | Stop reason: SURG

## 2022-08-08 RX ORDER — FAMOTIDINE 10 MG/ML
20 INJECTION, SOLUTION INTRAVENOUS ONCE
Status: COMPLETED | OUTPATIENT
Start: 2022-08-08 | End: 2022-08-08

## 2022-08-08 RX ORDER — HYDROMORPHONE HYDROCHLORIDE 1 MG/ML
0.25 INJECTION, SOLUTION INTRAMUSCULAR; INTRAVENOUS; SUBCUTANEOUS
Status: DISCONTINUED | OUTPATIENT
Start: 2022-08-08 | End: 2022-08-08 | Stop reason: HOSPADM

## 2022-08-08 RX ORDER — HEPARIN SODIUM 5000 [USP'U]/ML
5000 INJECTION, SOLUTION INTRAVENOUS; SUBCUTANEOUS ONCE
Status: COMPLETED | OUTPATIENT
Start: 2022-08-08 | End: 2022-08-08

## 2022-08-08 RX ORDER — OXYCODONE HYDROCHLORIDE 5 MG/1
5 TABLET ORAL EVERY 4 HOURS PRN
Status: DISCONTINUED | OUTPATIENT
Start: 2022-08-08 | End: 2022-08-09 | Stop reason: HOSPADM

## 2022-08-08 RX ORDER — LIDOCAINE HYDROCHLORIDE 10 MG/ML
0.5 INJECTION, SOLUTION EPIDURAL; INFILTRATION; INTRACAUDAL; PERINEURAL ONCE AS NEEDED
Status: DISCONTINUED | OUTPATIENT
Start: 2022-08-08 | End: 2022-08-08 | Stop reason: HOSPADM

## 2022-08-08 RX ORDER — BUPIVACAINE HYDROCHLORIDE 2.5 MG/ML
INJECTION, SOLUTION INFILTRATION; PERINEURAL AS NEEDED
Status: DISCONTINUED | OUTPATIENT
Start: 2022-08-08 | End: 2022-08-08 | Stop reason: HOSPADM

## 2022-08-08 RX ORDER — ONDANSETRON 4 MG/1
4 TABLET, FILM COATED ORAL EVERY 6 HOURS PRN
Status: DISCONTINUED | OUTPATIENT
Start: 2022-08-08 | End: 2022-08-09 | Stop reason: HOSPADM

## 2022-08-08 RX ORDER — MIDAZOLAM HYDROCHLORIDE 1 MG/ML
INJECTION INTRAMUSCULAR; INTRAVENOUS
Status: COMPLETED | OUTPATIENT
Start: 2022-08-08 | End: 2022-08-08

## 2022-08-08 RX ORDER — ONDANSETRON 2 MG/ML
4 INJECTION INTRAMUSCULAR; INTRAVENOUS ONCE AS NEEDED
Status: DISCONTINUED | OUTPATIENT
Start: 2022-08-08 | End: 2022-08-08 | Stop reason: HOSPADM

## 2022-08-08 RX ORDER — ONDANSETRON 2 MG/ML
4 INJECTION INTRAMUSCULAR; INTRAVENOUS EVERY 6 HOURS PRN
Status: DISCONTINUED | OUTPATIENT
Start: 2022-08-08 | End: 2022-08-09 | Stop reason: HOSPADM

## 2022-08-08 RX ORDER — ONDANSETRON 2 MG/ML
INJECTION INTRAMUSCULAR; INTRAVENOUS AS NEEDED
Status: DISCONTINUED | OUTPATIENT
Start: 2022-08-08 | End: 2022-08-08 | Stop reason: SURG

## 2022-08-08 RX ORDER — MIDAZOLAM HYDROCHLORIDE 1 MG/ML
1 INJECTION INTRAMUSCULAR; INTRAVENOUS
Status: DISCONTINUED | OUTPATIENT
Start: 2022-08-08 | End: 2022-08-08 | Stop reason: HOSPADM

## 2022-08-08 RX ORDER — NITROGLYCERIN 0.4 MG/1
0.4 TABLET SUBLINGUAL
Status: DISCONTINUED | OUTPATIENT
Start: 2022-08-08 | End: 2022-08-09 | Stop reason: HOSPADM

## 2022-08-08 RX ORDER — FENTANYL CITRATE 50 UG/ML
50 INJECTION, SOLUTION INTRAMUSCULAR; INTRAVENOUS
Status: DISCONTINUED | OUTPATIENT
Start: 2022-08-08 | End: 2022-08-08 | Stop reason: HOSPADM

## 2022-08-08 RX ORDER — ACETAMINOPHEN 500 MG
1000 TABLET ORAL ONCE
Status: COMPLETED | OUTPATIENT
Start: 2022-08-08 | End: 2022-08-08

## 2022-08-08 RX ORDER — KETOROLAC TROMETHAMINE 30 MG/ML
30 INJECTION, SOLUTION INTRAMUSCULAR; INTRAVENOUS EVERY 6 HOURS
Status: COMPLETED | OUTPATIENT
Start: 2022-08-08 | End: 2022-08-09

## 2022-08-08 RX ORDER — SODIUM CHLORIDE 9 MG/ML
75 INJECTION, SOLUTION INTRAVENOUS CONTINUOUS
Status: DISCONTINUED | OUTPATIENT
Start: 2022-08-08 | End: 2022-08-09 | Stop reason: HOSPADM

## 2022-08-08 RX ORDER — KETAMINE HCL IN NACL, ISO-OSM 100MG/10ML
10 SYRINGE (ML) INJECTION
Status: COMPLETED | OUTPATIENT
Start: 2022-08-08 | End: 2022-08-08

## 2022-08-08 RX ORDER — SODIUM CHLORIDE, SODIUM LACTATE, POTASSIUM CHLORIDE, CALCIUM CHLORIDE 600; 310; 30; 20 MG/100ML; MG/100ML; MG/100ML; MG/100ML
9 INJECTION, SOLUTION INTRAVENOUS CONTINUOUS
Status: DISCONTINUED | OUTPATIENT
Start: 2022-08-08 | End: 2022-08-09 | Stop reason: HOSPADM

## 2022-08-08 RX ORDER — ROCURONIUM BROMIDE 10 MG/ML
INJECTION, SOLUTION INTRAVENOUS AS NEEDED
Status: DISCONTINUED | OUTPATIENT
Start: 2022-08-08 | End: 2022-08-08 | Stop reason: SURG

## 2022-08-08 RX ORDER — BUPIVACAINE HYDROCHLORIDE 2.5 MG/ML
INJECTION, SOLUTION EPIDURAL; INFILTRATION; INTRACAUDAL
Status: COMPLETED | OUTPATIENT
Start: 2022-08-08 | End: 2022-08-08

## 2022-08-08 RX ORDER — TRISODIUM CITRATE DIHYDRATE AND CITRIC ACID MONOHYDRATE 500; 334 MG/5ML; MG/5ML
30 SOLUTION ORAL ONCE
Status: COMPLETED | OUTPATIENT
Start: 2022-08-08 | End: 2022-08-08

## 2022-08-08 RX ORDER — MINERAL OIL 471.99 G/472ML
OIL TOPICAL AS NEEDED
Status: DISCONTINUED | OUTPATIENT
Start: 2022-08-08 | End: 2022-08-08 | Stop reason: HOSPADM

## 2022-08-08 RX ORDER — METOCLOPRAMIDE HYDROCHLORIDE 5 MG/ML
10 INJECTION INTRAMUSCULAR; INTRAVENOUS ONCE AS NEEDED
Status: DISCONTINUED | OUTPATIENT
Start: 2022-08-08 | End: 2022-08-08 | Stop reason: HOSPADM

## 2022-08-08 RX ORDER — LEVOTHYROXINE SODIUM 0.05 MG/1
50 TABLET ORAL DAILY
Status: DISCONTINUED | OUTPATIENT
Start: 2022-08-08 | End: 2022-08-09 | Stop reason: HOSPADM

## 2022-08-08 RX ORDER — CEFAZOLIN SODIUM 2 G/100ML
2 INJECTION, SOLUTION INTRAVENOUS ONCE
Status: COMPLETED | OUTPATIENT
Start: 2022-08-08 | End: 2022-08-09

## 2022-08-08 RX ORDER — DIPHENHYDRAMINE HCL 25 MG
25 CAPSULE ORAL EVERY 4 HOURS PRN
Status: DISCONTINUED | OUTPATIENT
Start: 2022-08-08 | End: 2022-08-08 | Stop reason: HOSPADM

## 2022-08-08 RX ORDER — HYDROMORPHONE HYDROCHLORIDE 1 MG/ML
0.5 INJECTION, SOLUTION INTRAMUSCULAR; INTRAVENOUS; SUBCUTANEOUS
Status: DISCONTINUED | OUTPATIENT
Start: 2022-08-08 | End: 2022-08-09 | Stop reason: HOSPADM

## 2022-08-08 RX ORDER — CELECOXIB 200 MG/1
200 CAPSULE ORAL ONCE
Status: COMPLETED | OUTPATIENT
Start: 2022-08-08 | End: 2022-08-08

## 2022-08-08 RX ORDER — MAGNESIUM SULFATE HEPTAHYDRATE 500 MG/ML
INJECTION, SOLUTION INTRAMUSCULAR; INTRAVENOUS AS NEEDED
Status: DISCONTINUED | OUTPATIENT
Start: 2022-08-08 | End: 2022-08-08 | Stop reason: SURG

## 2022-08-08 RX ORDER — BISACODYL 10 MG
10 SUPPOSITORY, RECTAL RECTAL DAILY PRN
Status: DISCONTINUED | OUTPATIENT
Start: 2022-08-08 | End: 2022-08-09 | Stop reason: HOSPADM

## 2022-08-08 RX ORDER — FENTANYL CITRATE 50 UG/ML
INJECTION, SOLUTION INTRAMUSCULAR; INTRAVENOUS
Status: COMPLETED | OUTPATIENT
Start: 2022-08-08 | End: 2022-08-08

## 2022-08-08 RX ORDER — DIPHENHYDRAMINE HYDROCHLORIDE 50 MG/ML
25 INJECTION INTRAMUSCULAR; INTRAVENOUS EVERY 4 HOURS PRN
Status: DISCONTINUED | OUTPATIENT
Start: 2022-08-08 | End: 2022-08-08 | Stop reason: HOSPADM

## 2022-08-08 RX ORDER — DEXAMETHASONE SODIUM PHOSPHATE 4 MG/ML
INJECTION, SOLUTION INTRA-ARTICULAR; INTRALESIONAL; INTRAMUSCULAR; INTRAVENOUS; SOFT TISSUE AS NEEDED
Status: DISCONTINUED | OUTPATIENT
Start: 2022-08-08 | End: 2022-08-08 | Stop reason: SURG

## 2022-08-08 RX ORDER — MAGNESIUM HYDROXIDE 1200 MG/15ML
LIQUID ORAL AS NEEDED
Status: DISCONTINUED | OUTPATIENT
Start: 2022-08-08 | End: 2022-08-08 | Stop reason: HOSPADM

## 2022-08-08 RX ORDER — LIDOCAINE HYDROCHLORIDE 20 MG/ML
INJECTION, SOLUTION INFILTRATION; PERINEURAL AS NEEDED
Status: DISCONTINUED | OUTPATIENT
Start: 2022-08-08 | End: 2022-08-08 | Stop reason: SURG

## 2022-08-08 RX ADMIN — SCOPALAMINE 1 PATCH: 1 PATCH, EXTENDED RELEASE TRANSDERMAL at 06:38

## 2022-08-08 RX ADMIN — SUGAMMADEX 200 MG: 100 INJECTION, SOLUTION INTRAVENOUS at 09:16

## 2022-08-08 RX ADMIN — CEFAZOLIN SODIUM 2 G: 2 INJECTION, SOLUTION INTRAVENOUS at 22:02

## 2022-08-08 RX ADMIN — FAMOTIDINE 20 MG: 10 INJECTION INTRAVENOUS at 06:38

## 2022-08-08 RX ADMIN — MIDAZOLAM 2 MG: 1 INJECTION INTRAMUSCULAR; INTRAVENOUS at 07:07

## 2022-08-08 RX ADMIN — KETAMINE HYDROCHLORIDE 10 MG: 50 INJECTION, SOLUTION INTRAMUSCULAR; INTRAVENOUS at 08:56

## 2022-08-08 RX ADMIN — LIDOCAINE HYDROCHLORIDE 100 MG: 20 INJECTION, SOLUTION INFILTRATION; PERINEURAL at 07:55

## 2022-08-08 RX ADMIN — MAGNESIUM SULFATE HEPTAHYDRATE 1 G: 500 INJECTION, SOLUTION INTRAMUSCULAR; INTRAVENOUS at 08:15

## 2022-08-08 RX ADMIN — SODIUM CHLORIDE 75 ML/HR: 9 INJECTION, SOLUTION INTRAVENOUS at 12:15

## 2022-08-08 RX ADMIN — KETOROLAC TROMETHAMINE 30 MG: 30 INJECTION, SOLUTION INTRAMUSCULAR at 14:29

## 2022-08-08 RX ADMIN — Medication 10 MG: at 10:32

## 2022-08-08 RX ADMIN — CELECOXIB 200 MG: 200 CAPSULE ORAL at 06:27

## 2022-08-08 RX ADMIN — FENTANYL CITRATE 50 MCG: 50 INJECTION INTRAMUSCULAR; INTRAVENOUS at 07:07

## 2022-08-08 RX ADMIN — HYDROMORPHONE HYDROCHLORIDE 0.25 MG: 1 INJECTION, SOLUTION INTRAMUSCULAR; INTRAVENOUS; SUBCUTANEOUS at 11:27

## 2022-08-08 RX ADMIN — HYDROMORPHONE HYDROCHLORIDE 0.25 MG: 1 INJECTION, SOLUTION INTRAMUSCULAR; INTRAVENOUS; SUBCUTANEOUS at 10:57

## 2022-08-08 RX ADMIN — HYDROMORPHONE HYDROCHLORIDE 0.25 MG: 1 INJECTION, SOLUTION INTRAMUSCULAR; INTRAVENOUS; SUBCUTANEOUS at 09:56

## 2022-08-08 RX ADMIN — MIDAZOLAM 1 MG: 1 INJECTION INTRAMUSCULAR; INTRAVENOUS at 07:13

## 2022-08-08 RX ADMIN — BUPIVACAINE 10 ML: 13.3 INJECTION, SUSPENSION, LIPOSOMAL INFILTRATION at 07:15

## 2022-08-08 RX ADMIN — DEXAMETHASONE SODIUM PHOSPHATE 8 MG: 4 INJECTION, SOLUTION INTRA-ARTICULAR; INTRALESIONAL; INTRAMUSCULAR; INTRAVENOUS; SOFT TISSUE at 08:05

## 2022-08-08 RX ADMIN — HYDROMORPHONE HYDROCHLORIDE 0.5 MG: 1 INJECTION, SOLUTION INTRAMUSCULAR; INTRAVENOUS; SUBCUTANEOUS at 18:20

## 2022-08-08 RX ADMIN — HYDROMORPHONE HYDROCHLORIDE 0.5 MG: 1 INJECTION, SOLUTION INTRAMUSCULAR; INTRAVENOUS; SUBCUTANEOUS at 10:39

## 2022-08-08 RX ADMIN — OXYCODONE 5 MG: 5 TABLET ORAL at 10:13

## 2022-08-08 RX ADMIN — HYDROMORPHONE HYDROCHLORIDE 0.25 MG: 1 INJECTION, SOLUTION INTRAMUSCULAR; INTRAVENOUS; SUBCUTANEOUS at 10:26

## 2022-08-08 RX ADMIN — OXYCODONE HYDROCHLORIDE 5 MG: 5 TABLET ORAL at 20:12

## 2022-08-08 RX ADMIN — SODIUM CITRATE AND CITRIC ACID MONOHYDRATE 30 ML: 500; 334 SOLUTION ORAL at 06:43

## 2022-08-08 RX ADMIN — KETAMINE HYDROCHLORIDE 30 MG: 50 INJECTION, SOLUTION INTRAMUSCULAR; INTRAVENOUS at 08:07

## 2022-08-08 RX ADMIN — ACETAMINOPHEN 1000 MG: 500 TABLET, FILM COATED ORAL at 06:38

## 2022-08-08 RX ADMIN — Medication 10 MG: at 10:53

## 2022-08-08 RX ADMIN — KETOROLAC TROMETHAMINE 30 MG: 30 INJECTION, SOLUTION INTRAMUSCULAR at 10:00

## 2022-08-08 RX ADMIN — FENTANYL CITRATE 50 MCG: 50 INJECTION INTRAMUSCULAR; INTRAVENOUS at 07:55

## 2022-08-08 RX ADMIN — Medication 10 MG: at 11:12

## 2022-08-08 RX ADMIN — Medication 10 MG: at 10:42

## 2022-08-08 RX ADMIN — ROCURONIUM BROMIDE 40 MG: 50 INJECTION INTRAVENOUS at 07:57

## 2022-08-08 RX ADMIN — KETOROLAC TROMETHAMINE 30 MG: 30 INJECTION, SOLUTION INTRAMUSCULAR at 20:12

## 2022-08-08 RX ADMIN — ROCURONIUM BROMIDE 20 MG: 50 INJECTION INTRAVENOUS at 08:45

## 2022-08-08 RX ADMIN — CEFAZOLIN SODIUM 2 G: 2 INJECTION, SOLUTION INTRAVENOUS at 14:30

## 2022-08-08 RX ADMIN — ONDANSETRON 4 MG: 2 INJECTION INTRAMUSCULAR; INTRAVENOUS at 08:56

## 2022-08-08 RX ADMIN — GABAPENTIN 600 MG: 300 CAPSULE ORAL at 06:27

## 2022-08-08 RX ADMIN — SODIUM CHLORIDE, POTASSIUM CHLORIDE, SODIUM LACTATE AND CALCIUM CHLORIDE 9 ML/HR: 600; 310; 30; 20 INJECTION, SOLUTION INTRAVENOUS at 06:27

## 2022-08-08 RX ADMIN — BUPIVACAINE HYDROCHLORIDE 20 ML: 2.5 INJECTION, SOLUTION EPIDURAL; INFILTRATION; INTRACAUDAL; PERINEURAL at 07:15

## 2022-08-08 RX ADMIN — MAGNESIUM SULFATE HEPTAHYDRATE 1 G: 500 INJECTION, SOLUTION INTRAMUSCULAR; INTRAVENOUS at 07:57

## 2022-08-08 RX ADMIN — OXYCODONE HYDROCHLORIDE 5 MG: 5 TABLET ORAL at 14:35

## 2022-08-08 RX ADMIN — Medication 10 MG: at 11:02

## 2022-08-08 RX ADMIN — HEPARIN SODIUM 5000 UNITS: 5000 INJECTION INTRAVENOUS; SUBCUTANEOUS at 07:42

## 2022-08-08 RX ADMIN — CEFAZOLIN SODIUM 2 G: 2 INJECTION, SOLUTION INTRAVENOUS at 07:42

## 2022-08-08 RX ADMIN — HYDROMORPHONE HYDROCHLORIDE 0.5 MG: 1 INJECTION, SOLUTION INTRAMUSCULAR; INTRAVENOUS; SUBCUTANEOUS at 22:02

## 2022-08-08 RX ADMIN — HYDROMORPHONE HYDROCHLORIDE 0.5 MG: 1 INJECTION, SOLUTION INTRAMUSCULAR; INTRAVENOUS; SUBCUTANEOUS at 12:10

## 2022-08-08 RX ADMIN — PROPOFOL 200 MG: 10 INJECTION, EMULSION INTRAVENOUS at 07:57

## 2022-08-08 NOTE — ANESTHESIA PREPROCEDURE EVALUATION
Anesthesia Evaluation     Patient summary reviewed and Nursing notes reviewed   no history of anesthetic complications:  NPO Solid Status: > 8 hours  NPO Liquid Status: > 2 hours           Airway   Mallampati: II  TM distance: >3 FB  Neck ROM: full  No difficulty expected  Dental      Pulmonary - negative pulmonary ROS and normal exam    breath sounds clear to auscultation  Cardiovascular - negative cardio ROS and normal exam  Exercise tolerance: good (4-7 METS)    Rhythm: regular  Rate: normal    (-) angina, orthopnea, PND, SEGOVIA      Neuro/Psych  (+) psychiatric history Anxiety and Depression,    GI/Hepatic/Renal/Endo    (+)   thyroid problem thyroid cancer    Musculoskeletal (-) negative ROS    Abdominal    Substance History - negative use     OB/GYN negative ob/gyn ROS         Other      history of cancer                      Anesthesia Plan    ASA 2     general     (BRUNO for POPC)  intravenous induction     Anesthetic plan, risks, benefits, and alternatives have been provided, discussed and informed consent has been obtained with: patient.    Use of blood products discussed with patient .   Plan discussed with CRNA.        CODE STATUS:

## 2022-08-08 NOTE — ANESTHESIA PROCEDURE NOTES
Peripheral Block      Patient reassessed immediately prior to procedure    Patient location during procedure: pre-op  Start time: 8/8/2022 7:06 AM  Stop time: 8/8/2022 7:15 AM  Reason for block: at surgeon's request and post-op pain management  Performed by  Anesthesiologist: Jeet Joyner MD  Preanesthetic Checklist  Completed: patient identified, IV checked, site marked, risks and benefits discussed, surgical consent, monitors and equipment checked, pre-op evaluation and timeout performed  Prep:  Pt Position: sitting  Sterile barriers:gloves and cap  Prep: ChloraPrep  Patient monitoring: blood pressure monitoring, continuous pulse oximetry and EKG  Procedure    Sedation: yes    Guidance:ultrasound guided    ULTRASOUND INTERPRETATION. Using ultrasound guidance a 21 G gauge needle was placed in close proximity to the nerve, at which point, under ultrasound guidance anesthetic was injected in the area of the nerve and spread of the anesthesia was seen on ultrasound in close proximity thereto.  There were no abnormalities seen on ultrasound; a digital image was taken; and the patient tolerated the procedure with no complications. Images:still images obtained    Laterality:right  Block Type:erector spinae block (T6-T7)  Injection Technique:single-shot  Needle Type:echogenic  Needle Gauge:21 G  Resistance on Injection: none    Medications Used: bupivacaine liposome (EXPAREL) 1.3 % injection, 10 mL  bupivacaine PF (MARCAINE) 0.25 % injection, 20 mL  Med administered at 8/8/2022 7:15 AM      Post Assessment  Injection Assessment: negative aspiration for heme, no paresthesia on injection and incremental injection  Patient Tolerance:comfortable throughout block  Complications:no  Additional Notes  Ultrasound guidance was used to view and verify needle placement and medication disbursement.

## 2022-08-08 NOTE — ANESTHESIA PROCEDURE NOTES
Airway  Urgency: elective    Date/Time: 8/8/2022 8:02 AM  Airway not difficult    General Information and Staff    Patient location during procedure: OR  Anesthesiologist: Jeet Joyner MD  CRNA/CAA: Fernandez Nelson CRNA    Indications and Patient Condition  Indications for airway management: airway protection    Preoxygenated: yes  Mask difficulty assessment: 1 - vent by mask    Final Airway Details  Final airway type: endotracheal airway      Successful airway: EBT - double lumen left  Cuffed: yes   Successful intubation technique: direct laryngoscopy  Facilitating devices/methods: intubating stylet  Endotracheal tube insertion site: oral  Blade: Reyes  Blade size: 2  EBT DL size (fr): 39  Cormack-Lehane Classification: grade I - full view of glottis  Placement verified by: chest auscultation, bronchoscopy and capnometry   Measured from: lips  Number of attempts at approach: 1  Assessment: lips, teeth, and gum same as pre-op and atraumatic intubation    Additional Comments  Pre 02 100%, SIVI, DL x1, atraumatic intubation, BLBS, Positive ETC02.

## 2022-08-08 NOTE — ANESTHESIA POSTPROCEDURE EVALUATION
Patient: Juan M Lynch    Procedure Summary     Date: 08/08/22 Room / Location: [facility] OR 09 / [facility] MAIN OR    Anesthesia Start: 0749 Anesthesia Stop: 0936    Procedure: BRONCHOSCOPY, RIGHT THORACOSCOPY VIDEO ASSISTED WITH RESECTION OF RIGHT FIRST RIB, INTERCOSTAL NERVE BLOCK (Right Chest) Diagnosis:       TOS (thoracic outlet syndrome)      (TOS (thoracic outlet syndrome) [G54.0])    Surgeons: Manan Gasca III, MD Provider: Jeet Joyner MD    Anesthesia Type: general ASA Status: 2          Anesthesia Type: general    Vitals  Vitals Value Taken Time   /88 08/08/22 1131   Temp 36.4 °C (97.5 °F) 08/08/22 0933   Pulse 62 08/08/22 1137   Resp 16 08/08/22 1130   SpO2 94 % 08/08/22 1137   Vitals shown include unvalidated device data.        Post Anesthesia Care and Evaluation    Patient location during evaluation: bedside  Patient participation: complete - patient participated  Level of consciousness: awake and alert  Pain management: adequate    Airway patency: patent  Anesthetic complications: No anesthetic complications    Cardiovascular status: acceptable  Respiratory status: acceptable  Hydration status: acceptable    Comments: /88 (BP Location: Left arm, Patient Position: Lying)   Pulse 65   Temp 36.4 °C (97.5 °F) (Oral)   Resp 16   SpO2 94%

## 2022-08-08 NOTE — H&P
Patient Care Team:  Zoila Mckinney APRN as PCP - General (Nurse Practitioner)  Sarabjit Umanzor MD as Consulting Physician (Urology)    No chief complaint on file.    Neck and right shoulder pain    Subjective     History of Present Illness     Mr. Lynch is a 37-year-old male that I have been seeing since December 2021 for neck and right shoulder pain radiating into the right arm and hand associated with numbness and paresthesias.  He has undergone an extensive work-up for thoracic outlet syndrome.  During this work-up he was found to have a lesion in his thyroid which proved to be malignant.  He underwent a thyroidectomy for treatment.  His neck and shoulder pain persisted.  He had conservative treatment including interscalene blocks, physical therapy, and oral medications with no improvement in his symptoms.  His symptoms have gotten worse.  He is here today for a right first rib resection.    Review of Systems   Musculoskeletal: Positive for neck pain and neck stiffness.   Neurological: Positive for numbness.   All other systems reviewed and are negative.       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   • Nasal obstruction   • TOS (thoracic outlet syndrome)   • Postoperative hypothyroidism   • Sterilization consult   • Scrotal mass     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   • Thoracic outlet syndrome 08/01/2022   • Thyroid cancer (HCC)    • Thyroid nodule      Past Surgical History:   Procedure Laterality Date   • ELBOW PROCEDURE     • NASAL ENDOSCOPY N/A 5/16/2022    Procedure: NASAL ENDOSCOPY;  Surgeon: Reji Beebe MD;  Location: [facility] OR [facility] [place];  Service: ENT;  Laterality: N/A;   • THYROIDECTOMY Right 5/16/2022    Procedure: Right THYROIDECTOMY,  "excision of nasal polyps;  Surgeon: Reji Beebe MD;  Location: [facility] OR [facility] [place];  Service: ENT;  Laterality: Right;   • VASECTOMY       Family History   Problem Relation Age of Onset   • No Known Problems Mother    • No Known Problems Father    • Malig Hyperthermia Neg Hx      Social History     Socioeconomic History   • Marital status: Single   • Number of children: 2   Tobacco Use   • Smoking status: Never Smoker   • Smokeless tobacco: Never Used   Vaping Use   • Vaping Use: Never used   Substance and Sexual Activity   • Alcohol use: Yes     Comment: DAILY- BEER/LIQUOR   • Drug use: Yes     Types: Marijuana     Comment: \"SOME\"   • Sexual activity: Defer     Medications Prior to Admission   Medication Sig Dispense Refill Last Dose   • aspirin-acetaminophen-caffeine (EXCEDRIN MIGRAINE) 250-250-65 MG per tablet Take 1 tablet by mouth Every 6 (Six) Hours As Needed for Headache. HOLD FOR OR   Past Week at Unknown time   • Synthroid 50 MCG tablet Take 1 tablet by mouth Daily. 30 tablet 3 8/7/2022 at 1000     Allergies   Allergen Reactions   • Robitussin Severe Cgh-Sr Thrt [Acetaminophen-Dm] Anaphylaxis       Objective      Vital Signs  Temp:  [98.8 °F (37.1 °C)] 98.8 °F (37.1 °C)  Heart Rate:  [56] 56  Resp:  [19] 19  BP: (129)/(84) 129/84    Physical Exam   Constitutional:       Appearance: Normal appearance. He is well-developed.   HENT:      Head: Normocephalic.   Eyes:      General: Lids are normal.      Conjunctiva/sclera: Conjunctivae normal.      Pupils: Pupils are equal, round, and reactive to light.   Neck:      Thyroid: No thyroid mass or thyromegaly.      Vascular: No carotid bruit, hepatojugular reflux or JVD.      Trachea: Trachea normal.      Comments: Full range of motion without pain.  No tenderness in the right or left supraclavicular fossa.  No masses and no adenopathy.  Cardiovascular:      Rate and Rhythm: Normal rate and regular rhythm.  No extrasystoles are present.     Chest Wall: PMI is not " displaced.      Pulses: Normal pulses.      Heart sounds: Normal heart sounds, S1 normal and S2 normal.      Comments: Equivocal Adson's maneuver on the right.  Negative Adson's maneuver on the left.  Pulmonary:      Effort: Pulmonary effort is normal.      Breath sounds: Normal breath sounds.   Abdominal:      General: Bowel sounds are normal.      Palpations: Abdomen is soft. There is no mass.      Tenderness: There is no abdominal tenderness.      Hernia: No hernia is present.   Musculoskeletal:         General: Normal range of motion.      Cervical back: Normal range of motion and neck supple.      Comments: Normal strength in all muscle groups tested in both upper extremities.   is normal bilaterally.  Range of motion in the right shoulder is limited by pain.  Normal range of motion the left shoulder.  Very tender in the right deltopectoral groove.  No tenderness in the left deltopectoral groove.   Skin:     General: Skin is warm and dry.   Neurological:      Mental Status: He is alert and oriented to person, place, and time.      Cranial Nerves: No cranial nerve deficit.      Sensory: No sensory deficit.      Deep Tendon Reflexes: Reflexes are normal and symmetric.      Comments: Touch and fine motor function are intact bilaterally   Psychiatric:         Speech: Speech normal.         Behavior: Behavior normal.         Thought Content: Thought content normal.         Judgment: Judgment normal.  Patient was fully examined today.  I have found no significant changes since his initial exam.    Results Review:    I reviewed the patient's new clinical results.  I reviewed the patient's new imaging results and agree with the interpretation.    Imaging Results (Last 24 Hours)     ** No results found for the last 24 hours. **          Lab Results:  Lab Results (last 24 hours)     ** No results found for the last 24 hours. **              Assessment & Plan       TOS (thoracic outlet syndrome)      Assessment &  Plan    I discussed the patients findings and our recommendations with patient     I have recommended that the patient have a right VAT with right first rib resection for treatment of his neurogenic thoracic outlet syndrome.  I have explained the procedure as well as the risks and benefits.  I have answered all of his questions to his satisfaction.  He is requested that we proceed.      Manan Gasca III, MD  [facility]  08/08/22  06:39 EDT

## 2022-08-08 NOTE — OP NOTE
THORACOSCOPY WITH FIRST RIB RESECTION  Progress Note    Juan M Lynch  8/8/2022    Pre-op Diagnosis:   TOS (thoracic outlet syndrome) [G54.0]       Post-Op Diagnosis Codes:     * TOS (thoracic outlet syndrome) [G54.0]    Procedure/CPT® Codes:        Procedure(s):  BRONCHOSCOPY   RIGHT THORACOSCOPY VIDEO ASSISTED   RESECTION OF RIGHT FIRST RIB   INTERCOSTAL NERVE BLOCK    Surgeon(s):  Manan Gasca III, MD    Anesthesia: General with Block    Staff:   Circulator: Erica Galvan RN; Vielka Slade RN  Scrub Person: Ajith Lynne  Assistant: Gin Moy CRNFA  Other: Socorro Loaiza  Assistant: Gin Moy CRNFA      Estimated Blood Loss: 100ml    Urine Voided: * No values recorded between 8/8/2022  7:49 AM and 8/8/2022  9:22 AM *    Specimens:                Specimens     ID Source Type Tests Collected By Collected At Frozen?    A Rib, Right Tissue · TISSUE PATHOLOGY EXAM   Manan Gasca III, MD 8/8/22 0853 No    Description: RIGHT FIRST RIB    This specimen was not marked as sent.                Drains:   Chest Tube 1 Right (Active)   Dressing Type Gauze 08/08/22 0908   Dressing Status Clean;Dry;Intact 08/08/22 0908   Dressing Intervention New dressing 08/08/22 0908   Site Assessment Clean;Dry;Intact 08/08/22 0908       [REMOVED] Closed/Suction Drain Neck Bulb 15 Fr. (Removed)       Findings: Right first rib was normal.  No evidence of fracture or tumor.  Subclavian vein and artery showed no evidence of constriction or extrinsic compression.  Brachial plexus appeared normal grossly.        Complications: none    Assistant: Gin Moy CRNFA  was responsible for performing the following activities: Retraction, Suction, Irrigation, Suturing, Closing, Placing Dressing and Held/Positioned Camera and their skilled assistance was necessary for the success of this case.    Summary of procedure: Mr. Lynch was brought to the operating room and placed on the operating table in the supine  position.  Following the induction of adequate general endotracheal anesthesia the flexible bronchoscope was passed down the endotracheal tube.  Distal trachea and alley appeared normal.  Alley was sharp and nondisplaced.  The scope was then passed out the right mainstem bronchus.  Right upper lobe right middle lobe and right lower lobe bronchi showed no gross endobronchial lesions or mucosal abnormalities.  The bronchoscope was then passed out the left main bronchus.  Left upper lobe and left lower lobe bronchi showed no gross endobronchial lesions or mucosal abnormalities.  The bronchoscope was then used to position the double-lumen endotracheal tube in the left main bronchus.  Once the endotracheal tube was in good position the patient was turned into the left lateral decubitus position.  All pressure points were padded.  A roll was placed in the left axilla.  Patient was secured to the operating table with 3 inch adhesive tape and Velcro safety strap.  The right lung was now deflated.    The right arm shoulder and chest were all prepped and draped in usual sterile manner.  A port site was created in the seventh intercostal space posterior axillary line.  CO2 was insufflated into the chest.  2 other port sites were created.  The right first rib was identified.  Pleura and periosteum overlying the rib was incised with electrocautery.  Using Manning periosteal elevators the pleura and periosteum was stripped off the inferior surface of the rib.  This dissection was then carried onto the superior surface of the rib pushing the scalene muscles away from the rib.  The rib was now transected anteriorly using the endoscopic rongeur.  The rib was distracted inferiorly and further dissection was carried out along the superior surface back to the articulation of the rib with the spine.  The rib was partially disarticulated.  The rib was now cut posteriorly at the level of the spine with the endoscopic bone cutter.  The rib  was removed through the anterior port.  The operative field was irrigated and closely examined.  Hemostasis was obtained.  Subclavian vein and subclavian artery appeared normal with no evidence of constriction or extrinsic compression.  The brachial plexus was also visualized.  No tumors scar tissue or constriction was identified.  The entire operative field was sprayed with platelet rich plasma.    Intercostal nerve blocks were now performed with 0.25% Marcaine.  A single #28 [place] chest tube was passed through the camera port site and positioned in the paraspinous gutter.  The lung was inflated and filled the chest well.  The tube was secured to the chest wall with a suture 0 silk.  Sponge instrument and needle counts were correct.    All port sites were now closed in layers with 2-0 Vicryl and the skin with 4-0 Vicryl.  Dry sterile dressings were applied.  Patient was awakened and extubated in the operating room and transported to the recovery room in satisfactory condition having tolerated the procedure well.    .        Dictated utilizing Dragon dictation    Manan Gasca III, MD     Date: 8/8/2022  Time: 09:30 EDT

## 2022-08-08 NOTE — PLAN OF CARE
Problem: Adult Inpatient Plan of Care  Goal: Plan of Care Review  Outcome: Ongoing, Progressing  Flowsheets (Taken 8/8/2022 1733)  Progress: no change  Plan of Care Reviewed With: patient   Goal Outcome Evaluation:  Plan of Care Reviewed With: patient        Progress: no change       Pt had a bronch, vat and right first rib resection. Pt has been complaining of a lot of pain. PRN pain medications have been given. Pt is still on 3L nasal cannula. Pt is getting IV fluids and IV antibiotics. Pt has been tolerating a diet. Chest tube to -20 LWS, no air leak at this time. VSS Anticipate discharge home tomorrow 8/9/2022.

## 2022-08-08 NOTE — PLAN OF CARE
Problem: Pain (Lung Surgery)  Goal: Acceptable Pain Control  Outcome: Ongoing, Progressing   Goal Outcome Evaluation:

## 2022-08-09 ENCOUNTER — READMISSION MANAGEMENT (OUTPATIENT)
Dept: CALL CENTER | Facility: HOSPITAL | Age: 37
End: 2022-08-09

## 2022-08-09 ENCOUNTER — APPOINTMENT (OUTPATIENT)
Dept: GENERAL RADIOLOGY | Facility: HOSPITAL | Age: 37
End: 2022-08-09

## 2022-08-09 VITALS
OXYGEN SATURATION: 95 % | HEART RATE: 54 BPM | RESPIRATION RATE: 16 BRPM | WEIGHT: 143 LBS | BODY MASS INDEX: 23.82 KG/M2 | DIASTOLIC BLOOD PRESSURE: 79 MMHG | TEMPERATURE: 98 F | HEIGHT: 65 IN | SYSTOLIC BLOOD PRESSURE: 129 MMHG

## 2022-08-09 LAB
ANION GAP SERPL CALCULATED.3IONS-SCNC: 8 MMOL/L (ref 5–15)
BASOPHILS # BLD AUTO: 0.02 10*3/MM3 (ref 0–0.2)
BASOPHILS NFR BLD AUTO: 0.2 % (ref 0–1.5)
BUN SERPL-MCNC: 13 MG/DL (ref 6–20)
BUN/CREAT SERPL: 14.1 (ref 7–25)
CALCIUM SPEC-SCNC: 8.2 MG/DL (ref 8.6–10.5)
CHLORIDE SERPL-SCNC: 106 MMOL/L (ref 98–107)
CO2 SERPL-SCNC: 24 MMOL/L (ref 22–29)
CREAT SERPL-MCNC: 0.92 MG/DL (ref 0.76–1.27)
DEPRECATED RDW RBC AUTO: 47.2 FL (ref 37–54)
EGFRCR SERPLBLD CKD-EPI 2021: 108.5 ML/MIN/1.73
EOSINOPHIL # BLD AUTO: 0.01 10*3/MM3 (ref 0–0.4)
EOSINOPHIL NFR BLD AUTO: 0.1 % (ref 0.3–6.2)
ERYTHROCYTE [DISTWIDTH] IN BLOOD BY AUTOMATED COUNT: 13.8 % (ref 12.3–15.4)
GLUCOSE SERPL-MCNC: 120 MG/DL (ref 65–99)
HCT VFR BLD AUTO: 38.5 % (ref 37.5–51)
HGB BLD-MCNC: 12.8 G/DL (ref 13–17.7)
IMM GRANULOCYTES # BLD AUTO: 0.04 10*3/MM3 (ref 0–0.05)
IMM GRANULOCYTES NFR BLD AUTO: 0.4 % (ref 0–0.5)
LYMPHOCYTES # BLD AUTO: 1.46 10*3/MM3 (ref 0.7–3.1)
LYMPHOCYTES NFR BLD AUTO: 14.9 % (ref 19.6–45.3)
MCH RBC QN AUTO: 30.8 PG (ref 26.6–33)
MCHC RBC AUTO-ENTMCNC: 33.2 G/DL (ref 31.5–35.7)
MCV RBC AUTO: 92.5 FL (ref 79–97)
MONOCYTES # BLD AUTO: 0.95 10*3/MM3 (ref 0.1–0.9)
MONOCYTES NFR BLD AUTO: 9.7 % (ref 5–12)
NEUTROPHILS NFR BLD AUTO: 7.3 10*3/MM3 (ref 1.7–7)
NEUTROPHILS NFR BLD AUTO: 74.7 % (ref 42.7–76)
NRBC BLD AUTO-RTO: 0 /100 WBC (ref 0–0.2)
PLATELET # BLD AUTO: 201 10*3/MM3 (ref 140–450)
PMV BLD AUTO: 10.7 FL (ref 6–12)
POTASSIUM SERPL-SCNC: 3.9 MMOL/L (ref 3.5–5.2)
RBC # BLD AUTO: 4.16 10*6/MM3 (ref 4.14–5.8)
SODIUM SERPL-SCNC: 138 MMOL/L (ref 136–145)
WBC NRBC COR # BLD: 9.78 10*3/MM3 (ref 3.4–10.8)

## 2022-08-09 PROCEDURE — 80048 BASIC METABOLIC PNL TOTAL CA: CPT | Performed by: THORACIC SURGERY (CARDIOTHORACIC VASCULAR SURGERY)

## 2022-08-09 PROCEDURE — 71045 X-RAY EXAM CHEST 1 VIEW: CPT

## 2022-08-09 PROCEDURE — 25010000002 HYDROMORPHONE PER 4 MG: Performed by: THORACIC SURGERY (CARDIOTHORACIC VASCULAR SURGERY)

## 2022-08-09 PROCEDURE — 97161 PT EVAL LOW COMPLEX 20 MIN: CPT

## 2022-08-09 PROCEDURE — 25010000002 KETOROLAC TROMETHAMINE PER 15 MG: Performed by: THORACIC SURGERY (CARDIOTHORACIC VASCULAR SURGERY)

## 2022-08-09 PROCEDURE — 99024 POSTOP FOLLOW-UP VISIT: CPT | Performed by: NURSE PRACTITIONER

## 2022-08-09 PROCEDURE — 85025 COMPLETE CBC W/AUTO DIFF WBC: CPT | Performed by: THORACIC SURGERY (CARDIOTHORACIC VASCULAR SURGERY)

## 2022-08-09 RX ORDER — GABAPENTIN 300 MG/1
300 CAPSULE ORAL EVERY 8 HOURS SCHEDULED
Qty: 30 CAPSULE | Refills: 0 | Status: SHIPPED | OUTPATIENT
Start: 2022-08-09 | End: 2022-08-12

## 2022-08-09 RX ORDER — CELECOXIB 100 MG/1
100 CAPSULE ORAL 2 TIMES DAILY
Qty: 30 CAPSULE | Refills: 0 | Status: SHIPPED | OUTPATIENT
Start: 2022-08-09 | End: 2022-11-01

## 2022-08-09 RX ORDER — OXYCODONE HYDROCHLORIDE 5 MG/1
5 TABLET ORAL EVERY 6 HOURS PRN
Qty: 24 TABLET | Refills: 0 | Status: SHIPPED | OUTPATIENT
Start: 2022-08-09 | End: 2022-08-15

## 2022-08-09 RX ADMIN — KETOROLAC TROMETHAMINE 30 MG: 30 INJECTION, SOLUTION INTRAMUSCULAR at 08:22

## 2022-08-09 RX ADMIN — KETOROLAC TROMETHAMINE 30 MG: 30 INJECTION, SOLUTION INTRAMUSCULAR at 02:31

## 2022-08-09 RX ADMIN — OXYCODONE HYDROCHLORIDE 5 MG: 5 TABLET ORAL at 09:38

## 2022-08-09 RX ADMIN — OXYCODONE HYDROCHLORIDE 5 MG: 5 TABLET ORAL at 00:04

## 2022-08-09 RX ADMIN — LEVOTHYROXINE SODIUM 50 MCG: 0.05 TABLET ORAL at 08:22

## 2022-08-09 RX ADMIN — HYDROMORPHONE HYDROCHLORIDE 0.5 MG: 1 INJECTION, SOLUTION INTRAMUSCULAR; INTRAVENOUS; SUBCUTANEOUS at 02:31

## 2022-08-09 RX ADMIN — OXYCODONE HYDROCHLORIDE 5 MG: 5 TABLET ORAL at 05:44

## 2022-08-09 RX ADMIN — SODIUM CHLORIDE 75 ML/HR: 9 INJECTION, SOLUTION INTRAVENOUS at 00:07

## 2022-08-09 NOTE — OUTREACH NOTE
Prep Survey    Flowsheet Row Responses   [facility] patient discharged from? [place]   Is LACE score < 7 ? Yes   Emergency Room discharge w/ pulse ox? No   Eligibility [facility]   Date of Admission 08/08/22   Date of Discharge 08/09/22   Discharge Disposition Home or Self Care   Discharge diagnosis Thoracic outlet syndrome   Does the patient have one of the following disease processes/diagnoses(primary or secondary)? Other   Does the patient have Home health ordered? No   Is there a DME ordered? No   Prep survey completed? Yes          PIOTR PINEDA - Registered Nurse

## 2022-08-09 NOTE — PAYOR COMM NOTE
"Candice Jones (37 y.o. Male)     ATTN: DISCHARGE SUMMARY AND OP NOTE TO REVIEW: [id number]    [facility] DEPT: [phone],  [phone]    [facility]: NPI [id number]              Date of Birth   1985    Social Security Number       Address   [address] [place] [address]    Home Phone   [phone]    MRN   [id number]       [place]   None    Marital Status   Single                            Admission Date   8/8/22    Admission Type   Elective    Admitting Provider   Manan Gasca III, MD    Attending Provider       Department, Room/Bed   [facility], E551/1       Discharge Date   8/9/2022    Discharge Disposition   Home or Self Care    Discharge Destination                               Attending Provider: (none)   Allergies: Robitussin Severe Cgh-sr Thrt [Acetaminophen-dm]    Isolation: None   Infection: None   Code Status: Prior   Advance Care Planning Activity    Ht: 165.1 cm (65\")   Wt: 64.9 kg (143 lb)    Admission Cmt: None   Principal Problem: TOS (thoracic outlet syndrome) [G54.0]                 Active Insurance as of 8/8/2022     Primary Coverage     Payor Plan Insurance Group Employer/Plan Group    ANTHEM BLUE CROSS ANTHEM BLUE CROSS BLUE SHIELD PPO [id number]     Payor Plan Address Payor Plan Phone Number Payor Plan Fax Number Effective Dates    [address] [phone]  3/12/2016 - None Entered    [facility] [id number]       Subscriber Name Subscriber Birth Date Member ID       CANDICE JONES 1985 [id number]           Secondary Coverage     Payor Plan Insurance Group Employer/Plan Group    ANTHEM MEDICAID ANTHEM MEDICAID [id number]     Payor Plan Address Payor Plan Phone Number Payor Plan Fax Number Effective Dates    [address] [address] [phone]  1/1/2020 - None Entered    [facility] [id number]       Subscriber Name Subscriber Birth Date Member ID       CANDICE JONES 1985 [id number]                 Emergency " Contacts      (Rel.) Home Phone Work Phone Mobile Phone    yordy davenport (Significant Other) -- -- [phone]               History & Physical      Linker, Manan SHEARER III, MD at 08/08/22 0638                Patient Care Team:  Zoila Mckinney APRN as PCP - General (Nurse Practitioner)  Sarabjit Umanzor MD as Consulting Physician (Urology)    No chief complaint on file.    Neck and right shoulder pain    Subjective     History of Present Illness     Mr. Lynch is a 37-year-old male that I have been seeing since December 2021 for neck and right shoulder pain radiating into the right arm and hand associated with numbness and paresthesias.  He has undergone an extensive work-up for thoracic outlet syndrome.  During this work-up he was found to have a lesion in his thyroid which proved to be malignant.  He underwent a thyroidectomy for treatment.  His neck and shoulder pain persisted.  He had conservative treatment including interscalene blocks, physical therapy, and oral medications with no improvement in his symptoms.  His symptoms have gotten worse.  He is here today for a right first rib resection.    Review of Systems   Musculoskeletal: Positive for neck pain and neck stiffness.   Neurological: Positive for numbness.   All other systems reviewed and are negative.       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   • Nasal obstruction   • TOS (thoracic outlet syndrome)   • Postoperative hypothyroidism   • Sterilization consult   • Scrotal mass     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   • Thoracic outlet syndrome 08/01/2022   • Thyroid cancer (HCC)    • Thyroid nodule      Past Surgical History:   Procedure Laterality Date   • ELBOW PROCEDURE     • NASAL  "ENDOSCOPY N/A 5/16/2022    Procedure: NASAL ENDOSCOPY;  Surgeon: Reji Beebe MD;  Location: [facility] OR [facility];  Service: ENT;  Laterality: N/A;   • THYROIDECTOMY Right 5/16/2022    Procedure: Right THYROIDECTOMY, excision of nasal polyps;  Surgeon: Reji Beebe MD;  Location: [facility] OR [facility];  Service: ENT;  Laterality: Right;   • VASECTOMY       Family History   Problem Relation Age of Onset   • No Known Problems Mother    • No Known Problems Father    • Malig Hyperthermia Neg Hx      Social History     Socioeconomic History   • Marital status: Single   • Number of children: 2   Tobacco Use   • Smoking status: Never Smoker   • Smokeless tobacco: Never Used   Vaping Use   • Vaping Use: Never used   Substance and Sexual Activity   • Alcohol use: Yes     Comment: DAILY- BEER/LIQUOR   • Drug use: Yes     Types: Marijuana     Comment: \"SOME\"   • Sexual activity: Defer     Medications Prior to Admission   Medication Sig Dispense Refill Last Dose   • aspirin-acetaminophen-caffeine (EXCEDRIN MIGRAINE) 250-250-65 MG per tablet Take 1 tablet by mouth Every 6 (Six) Hours As Needed for Headache. HOLD FOR OR   Past Week at Unknown time   • Synthroid 50 MCG tablet Take 1 tablet by mouth Daily. 30 tablet 3 8/7/2022 at 1000     Allergies   Allergen Reactions   • Robitussin Severe Cgh-Sr Thrt [Acetaminophen-Dm] Anaphylaxis       Objective      Vital Signs  Temp:  [98.8 °F (37.1 °C)] 98.8 °F (37.1 °C)  Heart Rate:  [56] 56  Resp:  [19] 19  BP: (129)/(84) 129/84    Physical Exam   Constitutional:       Appearance: Normal appearance. He is well-developed.   HENT:      Head: Normocephalic.   Eyes:      General: Lids are normal.      Conjunctiva/sclera: Conjunctivae normal.      Pupils: Pupils are equal, round, and reactive to light.   Neck:      Thyroid: No thyroid mass or thyromegaly.      Vascular: No carotid bruit, hepatojugular reflux or JVD.      Trachea: Trachea normal.      Comments: Full range of motion without pain. "  No tenderness in the right or left supraclavicular fossa.  No masses and no adenopathy.  Cardiovascular:      Rate and Rhythm: Normal rate and regular rhythm.  No extrasystoles are present.     Chest Wall: PMI is not displaced.      Pulses: Normal pulses.      Heart sounds: Normal heart sounds, S1 normal and S2 normal.      Comments: Equivocal Adson's maneuver on the right.  Negative Adson's maneuver on the left.  Pulmonary:      Effort: Pulmonary effort is normal.      Breath sounds: Normal breath sounds.   Abdominal:      General: Bowel sounds are normal.      Palpations: Abdomen is soft. There is no mass.      Tenderness: There is no abdominal tenderness.      Hernia: No hernia is present.   Musculoskeletal:         General: Normal range of motion.      Cervical back: Normal range of motion and neck supple.      Comments: Normal strength in all muscle groups tested in both upper extremities.   is normal bilaterally.  Range of motion in the right shoulder is limited by pain.  Normal range of motion the left shoulder.  Very tender in the right deltopectoral groove.  No tenderness in the left deltopectoral groove.   Skin:     General: Skin is warm and dry.   Neurological:      Mental Status: He is alert and oriented to person, place, and time.      Cranial Nerves: No cranial nerve deficit.      Sensory: No sensory deficit.      Deep Tendon Reflexes: Reflexes are normal and symmetric.      Comments: Touch and fine motor function are intact bilaterally   Psychiatric:         Speech: Speech normal.         Behavior: Behavior normal.         Thought Content: Thought content normal.         Judgment: Judgment normal.  Patient was fully examined today.  I have found no significant changes since his initial exam.    Results Review:    I reviewed the patient's new clinical results.  I reviewed the patient's new imaging results and agree with the interpretation.    Imaging Results (Last 24 Hours)     ** No results found  for the last 24 hours. **          Lab Results:  Lab Results (last 24 hours)     ** No results found for the last 24 hours. **              Assessment & Plan       TOS (thoracic outlet syndrome)      Assessment & Plan    I discussed the patients findings and our recommendations with patient     I have recommended that the patient have a right VAT with right first rib resection for treatment of his neurogenic thoracic outlet syndrome.  I have explained the procedure as well as the risks and benefits.  I have answered all of his questions to his satisfaction.  He is requested that we proceed.      Manan Gasca III, MD  [facility]  08/08/22  06:39 EDT        Electronically signed by Manan Gasca III, MD at 08/08/22 0642          Operative/Procedure Notes (all)      Manan Gasca III, MD at 08/08/22 0824  Version 1 of 1         THORACOSCOPY WITH FIRST RIB RESECTION  Progress Note    Juan M Lynch  8/8/2022    Pre-op Diagnosis:   TOS (thoracic outlet syndrome) [G54.0]       Post-Op Diagnosis Codes:     * TOS (thoracic outlet syndrome) [G54.0]    Procedure/CPT® Codes:        Procedure(s):  BRONCHOSCOPY   RIGHT THORACOSCOPY VIDEO ASSISTED   RESECTION OF RIGHT FIRST RIB   INTERCOSTAL NERVE BLOCK    Surgeon(s):  Manan Gasca III, MD    Anesthesia: General with Block    Staff:   Circulator: Erica Galvan RN; Vielka Slade RN  Scrub Person: Ajith Lynne  Assistant: Gin Moy CRNFA  Other: Socorro Loaiza  Assistant: Gin Moy CRNFA      Estimated Blood Loss: 100ml    Urine Voided: * No values recorded between 8/8/2022  7:49 AM and 8/8/2022  9:22 AM *    Specimens:                Specimens     ID Source Type Tests Collected By Collected At Frozen?    A Rib, Right Tissue · TISSUE PATHOLOGY EXAM   Manan Gasca III, MD 8/8/22 0824 No    Description: RIGHT FIRST RIB    This specimen was not marked as sent.                Drains:   Chest Tube 1 Right (Active)    Dressing Type Gauze 08/08/22 0908   Dressing Status Clean;Dry;Intact 08/08/22 0908   Dressing Intervention New dressing 08/08/22 0908   Site Assessment Clean;Dry;Intact 08/08/22 0908       [REMOVED] Closed/Suction Drain Neck Bulb 15 Fr. (Removed)       Findings: Right first rib was normal.  No evidence of fracture or tumor.  Subclavian vein and artery showed no evidence of constriction or extrinsic compression.  Brachial plexus appeared normal grossly.        Complications: none    Assistant: Gin Moy CRNFA  was responsible for performing the following activities: Retraction, Suction, Irrigation, Suturing, Closing, Placing Dressing and Held/Positioned Camera and their skilled assistance was necessary for the success of this case.    Summary of procedure: Mr. Lynch was brought to the operating room and placed on the operating table in the supine position.  Following the induction of adequate general endotracheal anesthesia the flexible bronchoscope was passed down the endotracheal tube.  Distal trachea and alley appeared normal.  Alley was sharp and nondisplaced.  The scope was then passed out the right mainstem bronchus.  Right upper lobe right middle lobe and right lower lobe bronchi showed no gross endobronchial lesions or mucosal abnormalities.  The bronchoscope was then passed out the left main bronchus.  Left upper lobe and left lower lobe bronchi showed no gross endobronchial lesions or mucosal abnormalities.  The bronchoscope was then used to position the double-lumen endotracheal tube in the left main bronchus.  Once the endotracheal tube was in good position the patient was turned into the left lateral decubitus position.  All pressure points were padded.  A roll was placed in the left axilla.  Patient was secured to the operating table with 3 inch adhesive tape and Velcro safety strap.  The right lung was now deflated.    The right arm shoulder and chest were all prepped and draped in usual  sterile manner.  A port site was created in the seventh intercostal space posterior axillary line.  CO2 was insufflated into the chest.  2 other port sites were created.  The right first rib was identified.  Pleura and periosteum overlying the rib was incised with electrocautery.  Using Manning periosteal elevators the pleura and periosteum was stripped off the inferior surface of the rib.  This dissection was then carried onto the superior surface of the rib pushing the scalene muscles away from the rib.  The rib was now transected anteriorly using the endoscopic rongeur.  The rib was distracted inferiorly and further dissection was carried out along the superior surface back to the articulation of the rib with the spine.  The rib was partially disarticulated.  The rib was now cut posteriorly at the level of the spine with the endoscopic bone cutter.  The rib was removed through the anterior port.  The operative field was irrigated and closely examined.  Hemostasis was obtained.  Subclavian vein and subclavian artery appeared normal with no evidence of constriction or extrinsic compression.  The brachial plexus was also visualized.  No tumors scar tissue or constriction was identified.  The entire operative field was sprayed with platelet rich plasma.    Intercostal nerve blocks were now performed with 0.25% Marcaine.  A single #28 Canadian chest tube was passed through the camera port site and positioned in the paraspinous gutter.  The lung was inflated and filled the chest well.  The tube was secured to the chest wall with a suture 0 silk.  Sponge instrument and needle counts were correct.    All port sites were now closed in layers with 2-0 Vicryl and the skin with 4-0 Vicryl.  Dry sterile dressings were applied.  Patient was awakened and extubated in the operating room and transported to the recovery room in satisfactory condition having tolerated the procedure well.    .        Dictated utilizing Juan R  dictation    Manan Gasca III, MD     Date: 8/8/2022  Time: 09:30 EDT        Electronically signed by Manan Gasca III, MD at 08/08/22 0999          Discharge Summary      Phyllis Reynolds DNP, APRN at 08/09/22 1117     Attestation signed by Manan Gasca III, MD at 08/09/22 7950    I have reviewed this documentation and agree.                       Patient Care Team:  Zoila Mckinney APRN as PCP - General (Nurse Practitioner)  Sarabjit Umanzor MD as Consulting Physician (Urology)    Date of Admission: 8/8/2022   Date of Discharge:  8/9/2022    Discharge Diagnosis: Thoracic outlet syndrome    Presenting Problem  TOS (thoracic outlet syndrome) [G54.0]     History of Present Illness  Juan M Lnych is a 37 y.o. male who presented to Dr. Gasca for persistent right neck and shoulder pain associated with numbness and paresthesias into the hand.  While undergoing work-up for TOS, he was found to have a malignant thyroid lesion.  He underwent a thyroidectomy.  After he failed conservative treatment for TOS, Dr. Gasca recommended a right VAT with first rib resection.  The patient's questions were answered to his satisfaction he agreed to proceed.      Hospital Course  Mr. Lynch was admitted postoperatively status post right VAT with first rib resection.  He recovered in the postanesthesia care unit was transferred to [facility] for the remainder of his stay.  Chest x-ray performed this morning demonstrated stable postop appearance with chest tube in adequate position.  Chest tube without air leak prior to removal.  This was removed without difficulty.  Postoperative care instructions have been discussed with the patient at length.  His pain is adequately controlled, he is hemodynamically stable on room air and eager to discharge home today with plans to follow-up in our clinic in 2 weeks with a hospital performed chest x-ray.  He is to begin physical therapy prior to this appointment and take postoperative  medications including Tylenol, Celebrex, gabapentin and oxycodone as needed as directed.  Patient acknowledges understanding and agrees to the plan of care.      Procedures Performed  Procedure(s):  BRONCHOSCOPY, RIGHT THORACOSCOPY VIDEO ASSISTED WITH RESECTION OF RIGHT FIRST RIB, INTERCOSTAL NERVE BLOCK       Consults:   Consults     No orders found for last 30 day(s).          Pertinent Test Results:     Imaging Results (Last 24 Hours)     ** No results found for the last 24 hours. **          Lab Results (last 24 hours)     Procedure Component Value Units Date/Time    Basic Metabolic Panel [[id number]]  (Abnormal) Collected: 08/09/22 0540    Specimen: Blood Updated: 08/09/22 0721     Glucose 120 mg/dL      BUN 13 mg/dL      Creatinine 0.92 mg/dL      Sodium 138 mmol/L      Potassium 3.9 mmol/L      Chloride 106 mmol/L      CO2 24.0 mmol/L      Calcium 8.2 mg/dL      BUN/Creatinine Ratio 14.1     Anion Gap 8.0 mmol/L      eGFR 108.5 mL/min/1.73      Comment: National Kidney Foundation and American Society of Nephrology (ASN) Task Force recommended calculation based on the Chronic Kidney Disease Epidemiology Collaboration (CKD-EPI) equation refit without adjustment for race.       Narrative:      GFR Normal >60  Chronic Kidney Disease <60  Kidney Failure <15      CBC & Differential [[id number]]  (Abnormal) Collected: 08/09/22 0540    Specimen: Blood Updated: 08/09/22 0648    Narrative:      The following orders were created for panel order CBC & Differential.  Procedure                               Abnormality         Status                     ---------                               -----------         ------                     CBC Auto Differential[[id number]]        Abnormal            Final result                 Please view results for these tests on the individual orders.    CBC Auto Differential [[id number]]  (Abnormal) Collected: 08/09/22 0540    Specimen: Blood Updated: 08/09/22 0648     WBC 9.78 10*3/mm3   "    RBC 4.16 10*6/mm3      Hemoglobin 12.8 g/dL      Hematocrit 38.5 %      MCV 92.5 fL      MCH 30.8 pg      MCHC 33.2 g/dL      RDW 13.8 %      RDW-SD 47.2 fl      MPV 10.7 fL      Platelets 201 10*3/mm3      Neutrophil % 74.7 %      Lymphocyte % 14.9 %      Monocyte % 9.7 %      Eosinophil % 0.1 %      Basophil % 0.2 %      Immature Grans % 0.4 %      Neutrophils, Absolute 7.30 10*3/mm3      Lymphocytes, Absolute 1.46 10*3/mm3      Monocytes, Absolute 0.95 10*3/mm3      Eosinophils, Absolute 0.01 10*3/mm3      Basophils, Absolute 0.02 10*3/mm3      Immature Grans, Absolute 0.04 10*3/mm3      nRBC 0.0 /100 WBC     Tissue Pathology Exam [[id number]] Collected: 08/08/22 0853    Specimen: Tissue from Rib, Right Updated: 08/08/22 1625     Case Report --     Surgical Pathology Report                         Case: [id number]                                  Authorizing Provider:  Manan Gasca III, MD  Collected:           08/08/2022 08:53 AM          Ordering Location:     [facility]  Received:            08/08/2022 10:28 AM                                 MAIN OR                                                                      Pathologist:           Simone Manning MD                                                          Specimen:    Rib, Right, RIGHT FIRST RIB                                                                 Final Diagnosis --     1. Bone, Right First Rib, Partial Resection: (Gross diagnosis only)   A. Unremarkable bone fragment consistent with provided clinical history of thoracic outlet syndrome.     [facility]       Gross Description --     1. Rib, Right.   Received in formalin labeled with the patient's name and designated \"right first rib\" consists of a portion of curvilinear pink tan bone that measures 6.7 x 2.1 x 0.4 cm.  The specimen is received with minimal focal areas of attached unremarkable soft pink tan tissue.  The specimen is serially sectioned and reveals no " discrete mass, lesion or nodule present.  No specimen will be submitted for routine histology, gross diagnosis only.   pf/uso/mec/jse              Condition on Discharge:  stable    Vital Signs  Temp:  [96.3 °F (35.7 °C)-98.6 °F (37 °C)] 98 °F (36.7 °C)  Heart Rate:  [54-69] 54  Resp:  [16-18] 16  BP: (108-129)/(52-86) 129/79    Physical Exam:    General Appearance:    Alert, cooperative, in no acute distress   Head:    Normocephalic, without obvious abnormality, atraumatic   Eyes:            Lids and lashes normal, conjunctivae and sclerae normal, no   icterus, no pallor, corneas clear, PERRLA   Ears:    Ears appear intact with no abnormalities noted   Throat:   No oral lesions, no thrush, oral mucosa moist   Neck:   No adenopathy, supple, trachea midline, no thyromegaly, no   carotid bruit, no JVD   Back:     No kyphosis present, no scoliosis present, no skin lesions,      erythema or scars, no tenderness to percussion or                   palpation,   range of motion normal   Lungs:     Clear to auscultation,respirations regular, even and                  unlabored    Heart:    Regular rhythm and normal rate, normal S1 and S2, no            murmur, no gallop, no rub, no click   Chest Wall:    No abnormalities observed   Abdomen:     Normal bowel sounds, no masses, no organomegaly, soft        non-tender, non-distended, no guarding, no rebound                tenderness   Rectal:     Deferred   Extremities:   Moves all extremities well, no edema, no cyanosis, no             Redness.  Slightly limited range of motion to right upper extremity.   Pulses:   Pulses palpable and equal bilaterally   Skin:   No bleeding, bruising or rash.  Postoperative incisions well approximated with Dermabond intact.  Dry DuoDERM applied to chest tube site.  No purulence or erythema.  Slight tenderness to palpation.   Lymph nodes:   No palpable adenopathy   Neurologic:   Cranial nerves 2 - 12 grossly intact, sensation intact, DTR        present and equal bilaterally       Discharge Disposition  Home today    Discharge Medications     Discharge Medications      New Medications      Instructions Start Date   celecoxib 100 MG capsule  Commonly known as: CeleBREX   100 mg, Oral, 2 Times Daily      gabapentin 300 MG capsule  Commonly known as: NEURONTIN   300 mg, Oral, Every 8 Hours Scheduled      oxyCODONE 5 MG immediate release tablet  Commonly known as: ROXICODONE  Notes to patient: Can take this medication again at 3:40p.m.   5 mg, Oral, Every 6 Hours PRN         Continue These Medications      Instructions Start Date   aspirin-acetaminophen-caffeine 250-250-65 MG per tablet  Commonly known as: EXCEDRIN MIGRAINE   1 tablet, Oral, Every 6 Hours PRN, HOLD FOR OR      Synthroid 50 MCG tablet  Generic drug: levothyroxine   50 mcg, Oral, Daily             Discharge Instructions:  · No heavy lifting, pushing, pulling greater than 10 pounds.  · No driving up until 2 weeks after surgery and no longer taking narcotics.  · Resume home diet as tolerated.  · Continue incentive spirometer at least 4 times per day.  · Remove dressing from post chest tube site after 48 hours, may shower and clean surgical sites with antibacterial soap or hydrogen peroxide, and apply gauze dressing or band-aid as needed for any drainage.  No dressing needed once no longer draining.          Follow-up Appointments  Future Appointments   Date Time Provider Department Center   8/30/2022  9:30 AM Manan Gasca III, MD [facility]   12/21/2022  2:15 PM Reji Beebe MD [facility]     Additional Instructions for the Follow-ups that You Need to Schedule     XR Chest 2 View    Aug 14, 2022 (Approximate)      Exam reason: post-op               Test Results Pending at Discharge      For any questions regarding patient's stay, please refer to patient's chart.    Phyllis Reynolds, DNP, APRN  [facility]  08/09/22  14:08 EDT    Greater than 30 minutes spent on  the unit discharging this patient, with more than 50% of time spent assessing the patient, counseling the patient on postoperative care and discharge planning.          Electronically signed by Manan Gasca III, MD at 08/09/22 1559       Discharge Order (From admission, onward)     Start     Ordered    08/09/22 1004  Discharge patient  Once        Expected Discharge Date: 08/09/22    Discharge Disposition: Home or Self Care    Physician of Record for Attribution - Please select from Treatment Team: MANAN GASCA III [[id number]]    Review needed by CMO to determine Physician of Record: No       Question Answer Comment   Physician of Record for Attribution - Please select from Treatment Team MANAN GASCA III    Review needed by CMO to determine Physician of Record No        08/09/22 1003

## 2022-08-09 NOTE — DISCHARGE SUMMARY
Patient Care Team:  Zoila Mckinney APRN as PCP - General (Nurse Practitioner)  Sarabjit Umanzor MD as Consulting Physician (Urology)    Date of Admission: 8/8/2022   Date of Discharge:  8/9/2022    Discharge Diagnosis: Thoracic outlet syndrome    Presenting Problem  TOS (thoracic outlet syndrome) [G54.0]     History of Present Illness  Juan M Lynch is a 37 y.o. male who presented to Dr. Gasca for persistent right neck and shoulder pain associated with numbness and paresthesias into the hand.  While undergoing work-up for TOS, he was found to have a malignant thyroid lesion.  He underwent a thyroidectomy.  After he failed conservative treatment for TOS, Dr. Gasca recommended a right VAT with first rib resection.  The patient's questions were answered to his satisfaction he agreed to proceed.      Hospital Course  Mr. Lynch was admitted postoperatively status post right VAT with first rib resection.  He recovered in the postanesthesia care unit was transferred to [facility] for the remainder of his stay.  Chest x-ray performed this morning demonstrated stable postop appearance with chest tube in adequate position.  Chest tube without air leak prior to removal.  This was removed without difficulty.  Postoperative care instructions have been discussed with the patient at length.  His pain is adequately controlled, he is hemodynamically stable on room air and eager to discharge home today with plans to follow-up in our clinic in 2 weeks with a hospital performed chest x-ray.  He is to begin physical therapy prior to this appointment and take postoperative medications including Tylenol, Celebrex, gabapentin and oxycodone as needed as directed.  Patient acknowledges understanding and agrees to the plan of care.      Procedures Performed  Procedure(s):  BRONCHOSCOPY, RIGHT THORACOSCOPY VIDEO ASSISTED WITH RESECTION OF RIGHT FIRST RIB, INTERCOSTAL NERVE BLOCK       Consults:   Consults     No orders found for last  30 day(s).          Pertinent Test Results:     Imaging Results (Last 24 Hours)     ** No results found for the last 24 hours. **          Lab Results (last 24 hours)     Procedure Component Value Units Date/Time    Basic Metabolic Panel [[id number]]  (Abnormal) Collected: 08/09/22 0540    Specimen: Blood Updated: 08/09/22 0721     Glucose 120 mg/dL      BUN 13 mg/dL      Creatinine 0.92 mg/dL      Sodium 138 mmol/L      Potassium 3.9 mmol/L      Chloride 106 mmol/L      CO2 24.0 mmol/L      Calcium 8.2 mg/dL      BUN/Creatinine Ratio 14.1     Anion Gap 8.0 mmol/L      eGFR 108.5 mL/min/1.73      Comment: National Kidney Foundation and American Society of Nephrology (ASN) Task Force recommended calculation based on the Chronic Kidney Disease Epidemiology Collaboration (CKD-EPI) equation refit without adjustment for race.       Narrative:      GFR Normal >60  Chronic Kidney Disease <60  Kidney Failure <15      CBC & Differential [[id number]]  (Abnormal) Collected: 08/09/22 0540    Specimen: Blood Updated: 08/09/22 0648    Narrative:      The following orders were created for panel order CBC & Differential.  Procedure                               Abnormality         Status                     ---------                               -----------         ------                     CBC Auto Differential[[id number]]        Abnormal            Final result                 Please view results for these tests on the individual orders.    CBC Auto Differential [[id number]]  (Abnormal) Collected: 08/09/22 0540    Specimen: Blood Updated: 08/09/22 0648     WBC 9.78 10*3/mm3      RBC 4.16 10*6/mm3      Hemoglobin 12.8 g/dL      Hematocrit 38.5 %      MCV 92.5 fL      MCH 30.8 pg      MCHC 33.2 g/dL      RDW 13.8 %      RDW-SD 47.2 fl      MPV 10.7 fL      Platelets 201 10*3/mm3      Neutrophil % 74.7 %      Lymphocyte % 14.9 %      Monocyte % 9.7 %      Eosinophil % 0.1 %      Basophil % 0.2 %      Immature Grans % 0.4 %       "Neutrophils, Absolute 7.30 10*3/mm3      Lymphocytes, Absolute 1.46 10*3/mm3      Monocytes, Absolute 0.95 10*3/mm3      Eosinophils, Absolute 0.01 10*3/mm3      Basophils, Absolute 0.02 10*3/mm3      Immature Grans, Absolute 0.04 10*3/mm3      nRBC 0.0 /100 WBC     Tissue Pathology Exam [[id number]] Collected: 08/08/22 0853    Specimen: Tissue from Rib, Right Updated: 08/08/22 1625     Case Report --     Surgical Pathology Report                         Case: [id number]                                  Authorizing Provider:  Manan Gasca III, MD  Collected:           08/08/2022 08:53 AM          Ordering Location:     [facility]  Received:            08/08/2022 10:28 AM                                 MAIN OR                                                                      Pathologist:           Simone Manning MD                                                          Specimen:    Rib, Right, RIGHT FIRST RIB                                                                 Final Diagnosis --     1. Bone, Right First Rib, Partial Resection: (Gross diagnosis only)   A. Unremarkable bone fragment consistent with provided clinical history of thoracic outlet syndrome.     [facility]       Gross Description --     1. Rib, Right.   Received in formalin labeled with the patient's name and designated \"right first rib\" consists of a portion of curvilinear pink tan bone that measures 6.7 x 2.1 x 0.4 cm.  The specimen is received with minimal focal areas of attached unremarkable soft pink tan tissue.  The specimen is serially sectioned and reveals no discrete mass, lesion or nodule present.  No specimen will be submitted for routine histology, gross diagnosis only.   pf/uso/mec/jse              Condition on Discharge:  stable    Vital Signs  Temp:  [96.3 °F (35.7 °C)-98.6 °F (37 °C)] 98 °F (36.7 °C)  Heart Rate:  [54-69] 54  Resp:  [16-18] 16  BP: (108-129)/(52-86) 129/79    Physical Exam:    General " Appearance:    Alert, cooperative, in no acute distress   Head:    Normocephalic, without obvious abnormality, atraumatic   Eyes:            Lids and lashes normal, conjunctivae and sclerae normal, no   icterus, no pallor, corneas clear, PERRLA   Ears:    Ears appear intact with no abnormalities noted   Throat:   No oral lesions, no thrush, oral mucosa moist   Neck:   No adenopathy, supple, trachea midline, no thyromegaly, no   carotid bruit, no JVD   Back:     No kyphosis present, no scoliosis present, no skin lesions,      erythema or scars, no tenderness to percussion or                   palpation,   range of motion normal   Lungs:     Clear to auscultation,respirations regular, even and                  unlabored    Heart:    Regular rhythm and normal rate, normal S1 and S2, no            murmur, no gallop, no rub, no click   Chest Wall:    No abnormalities observed   Abdomen:     Normal bowel sounds, no masses, no organomegaly, soft        non-tender, non-distended, no guarding, no rebound                tenderness   Rectal:     Deferred   Extremities:   Moves all extremities well, no edema, no cyanosis, no             Redness.  Slightly limited range of motion to right upper extremity.   Pulses:   Pulses palpable and equal bilaterally   Skin:   No bleeding, bruising or rash.  Postoperative incisions well approximated with Dermabond intact.  Dry DuoDERM applied to chest tube site.  No purulence or erythema.  Slight tenderness to palpation.   Lymph nodes:   No palpable adenopathy   Neurologic:   Cranial nerves 2 - 12 grossly intact, sensation intact, DTR       present and equal bilaterally       Discharge Disposition  Home today    Discharge Medications     Discharge Medications      New Medications      Instructions Start Date   celecoxib 100 MG capsule  Commonly known as: CeleBREX   100 mg, Oral, 2 Times Daily      gabapentin 300 MG capsule  Commonly known as: NEURONTIN   300 mg, Oral, Every 8 Hours  Scheduled      oxyCODONE 5 MG immediate release tablet  Commonly known as: ROXICODONE  Notes to patient: Can take this medication again at 3:40p.m.   5 mg, Oral, Every 6 Hours PRN         Continue These Medications      Instructions Start Date   aspirin-acetaminophen-caffeine 250-250-65 MG per tablet  Commonly known as: EXCEDRIN MIGRAINE   1 tablet, Oral, Every 6 Hours PRN, HOLD FOR OR      Synthroid 50 MCG tablet  Generic drug: levothyroxine   50 mcg, Oral, Daily             Discharge Instructions:  · No heavy lifting, pushing, pulling greater than 10 pounds.  · No driving up until 2 weeks after surgery and no longer taking narcotics.  · Resume home diet as tolerated.  · Continue incentive spirometer at least 4 times per day.  · Remove dressing from post chest tube site after 48 hours, may shower and clean surgical sites with antibacterial soap or hydrogen peroxide, and apply gauze dressing or band-aid as needed for any drainage.  No dressing needed once no longer draining.          Follow-up Appointments  Future Appointments   Date Time Provider Department Center   8/30/2022  9:30 AM [facility]   12/21/2022  2:15 PM Reji Beebe MD [facility]     Additional Instructions for the Follow-ups that You Need to Schedule     XR Chest 2 View    Aug 14, 2022 (Approximate)      Exam reason: post-op               Test Results Pending at Discharge      For any questions regarding patient's stay, please refer to patient's chart.    Phyllis Reynolds DNP, APRN  [facility]  08/09/22  14:08 EDT    Greater than 30 minutes spent on the unit discharging this patient, with more than 50% of time spent assessing the patient, counseling the patient on postoperative care and discharge planning.

## 2022-08-09 NOTE — PLAN OF CARE
Goal Outcome Evaluation:  Plan of Care Reviewed With: patient, family            Patient is currently independent with functional mobility and has no further questions/concerns regarding functional mobility or home safety.  T.O.S. protocol H.E.P. given in written, pictorial format for home use (3x's/day).  Encouraged patient to continue ambulation at home as well as H.E.P.  Will sign off.     Patient was wearing a face mask during this therapy encounter. Therapist used appropriate personal protective equipment including eye protection, mask, and gloves.  Mask used was standard procedure mask. Appropriate PPE was worn during the entire therapy session. Hand hygiene was completed before and after therapy session. Patient is not in enhanced droplet precautions.

## 2022-08-09 NOTE — THERAPY DISCHARGE NOTE
Patient Name: Juan M Lynch  : 1985    MRN: [id number]                              Today's Date: 2022       Admit Date: 2022    Visit Dx:     ICD-10-CM ICD-9-CM   1. 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   • Nasal obstruction   • TOS (thoracic outlet syndrome)   • Postoperative hypothyroidism   • Sterilization consult   • Scrotal mass     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   • Thoracic outlet syndrome 2022   • Thyroid cancer (HCC)    • Thyroid nodule      Past Surgical History:   Procedure Laterality Date   • ELBOW PROCEDURE     • FIRST RIB RESECTION Right 2022    Procedure: BRONCHOSCOPY, RIGHT THORACOSCOPY VIDEO ASSISTED WITH RESECTION OF RIGHT FIRST RIB, INTERCOSTAL NERVE BLOCK;  Surgeon: Manan Gasca III, MD;  Location: [facility] OR;  Service: Thoracic;  Laterality: Right;   • NASAL ENDOSCOPY N/A 2022    Procedure: NASAL ENDOSCOPY;  Surgeon: Reji Beebe MD;  Location: [facility] OR [facility];  Service: ENT;  Laterality: N/A;   • THYROIDECTOMY Right 2022    Procedure: Right THYROIDECTOMY, excision of nasal polyps;  Surgeon: Reji Beebe MD;  Location: [facility] OR [facility];  Service: ENT;  Laterality: Right;   • VASECTOMY        General Information     Row Name 22 1128          Physical Therapy Time and Intention    Document Type discharge evaluation/summary  Pt. is s/p Right VAT with Right 1st rib resection  -MS     Mode of Treatment physical therapy;individual therapy  -MS     Row Name 22 1128          General Information    Patient Profile Reviewed yes  -MS     Prior Level of Function independent:  -MS     Barriers to Rehab none identified  -MS     Row Name  08/09/22 1128          Cognition    Orientation Status (Cognition) oriented x 3  -MS           User Key  (r) = Recorded By, (t) = Taken By, (c) = Cosigned By    Initials Name Provider Type    Kip Mak, PT Physical Therapist               Mobility     Row Name 08/09/22 1129          Bed Mobility    Bed Mobility supine-sit;sit-supine  -MS     Supine-Sit [place] (Bed Mobility) independent  -MS     Sit-Supine [place] (Bed Mobility) independent  -MS     Row Name 08/09/22 1129          Sit-Stand Transfer    Sit-Stand [place] (Transfers) independent  -MS     Row Name 08/09/22 1129          Gait/Stairs (Locomotion)    [place] Level (Gait) independent  -MS     Distance in Feet (Gait) 30 feet  -MS     Comment, (Gait/Stairs) No balance deficits noted.  -MS           User Key  (r) = Recorded By, (t) = Taken By, (c) = Cosigned By    Initials Name Provider Type    Kip Mak, PT Physical Therapist               Obj/Interventions     Row Name 08/09/22 1129          Range of Motion Comprehensive    Comment, General Range of Motion LUE/BLE (WFL's);  Right Shld (Imp. 25%)  -MS     Row Name 08/09/22 1129          Strength Comprehensive (MMT)    Comment, General Manual Muscle Testing (MMT) Assessment LUE/BLE (4/5); Right Shld (3-/5)  -MS     Row Name 08/09/22 1129          Motor Skills    Therapeutic Exercise --  T.O.S. ther. ex. program x 10 reps completed (Shld Shrugs, Shld Rolls, Shld Abduction, Shld Flexion, Cervical AROM in all planes);  H.E.P. given in written, pictorial format for home use. (1 set/10 reps;  3 x's/day)  -MS           User Key  (r) = Recorded By, (t) = Taken By, (c) = Cosigned By    Initials Name Provider Type    Kip Mak, PT Physical Therapist               Goals/Plan    No documentation.                Clinical Impression     Row Name 08/09/22 1131          Pain    Pretreatment Pain Rating 6/10  -MS     Posttreatment Pain Rating 6/10  -MS     Pain Location -  Side/Orientation Right  -MS     Pain Location - shoulder;back;neck  -MS     Pain Intervention(s) Medication (See MAR);Nursing Notified;Repositioned  -MS     Row Name 08/09/22 1131          Plan of Care Review    Plan of Care Reviewed With patient;family  -MS     Row Name 08/09/22 1131          Therapy Assessment/Plan (PT)    Criteria for Skilled Interventions Met (PT) no problems identified which require skilled intervention  -MS     Row Name 08/09/22 1131          Positioning and Restraints    Pre-Treatment Position in bed  -MS     Post Treatment Position bed  -MS     In Bed notified nsg;supine;call light within reach;encouraged to call for assist  All lines intact.  -MS           User Key  (r) = Recorded By, (t) = Taken By, (c) = Cosigned By    Initials Name Provider Type    Kip Mak PT Physical Therapist               Outcome Measures     Row Name 08/09/22 1132          How much help from another person do you currently need...    Turning from your back to your side while in flat bed without using bedrails? 4  -MS     Moving from lying on back to sitting on the side of a flat bed without bedrails? 4  -MS     Moving to and from a bed to a chair (including a wheelchair)? 4  -MS     Standing up from a chair using your arms (e.g., wheelchair, bedside chair)? 4  -MS     Climbing 3-5 steps with a railing? 4  -MS     To walk in hospital room? 4  -MS     AM-PAC 6 Clicks Score (PT) 24  -MS     Highest level of mobility 8 --> Walked 250 feet or more  -MS     Row Name 08/09/22 1132          Functional Assessment    Outcome Measure Options AM-PAC 6 Clicks Basic Mobility (PT)  -MS           User Key  (r) = Recorded By, (t) = Taken By, (c) = Cosigned By    Initials Name Provider Type    Kip Mak PT Physical Therapist              Physical Therapy Education                 Title: PT OT SLP Therapies (Resolved)     Topic: Physical Therapy (Resolved)     Point: Mobility training (Resolved)     Learning  Progress Summary           Patient Acceptance, E,D, VU,DU by MS at 8/9/2022 1133                   Point: Home exercise program (Resolved)     Learning Progress Summary           Patient Acceptance, E,D, VU,DU by MS at 8/9/2022 1133                   Point: Body mechanics (Resolved)     Learning Progress Summary           Patient Acceptance, E,D, VU,DU by MS at 8/9/2022 1133                   Point: Precautions (Resolved)     Learning Progress Summary           Patient Acceptance, E,D, VU,DU by MS at 8/9/2022 1133                               User Key     Initials Effective Dates Name Provider Type Discipline    MS 06/16/21 -  Kip Collins, PT Physical Therapist PT              PT Recommendation and Plan     Plan of Care Reviewed With: patient, family     Time Calculation:    PT Charges     Row Name 08/09/22 1136             Time Calculation    Start Time 1030  -MS      Stop Time 1044  -MS      Time Calculation (min) 14 min  -MS      PT Received On 08/09/22  -MS              Time Calculation- PT    Total Timed Code Minutes- PT 13 minute(s)  -MS            User Key  (r) = Recorded By, (t) = Taken By, (c) = Cosigned By    Initials Name Provider Type    MS Kip Collins, PT Physical Therapist              Therapy Charges for Today     Code Description Service Date Service Provider Modifiers Qty    91645472905 HC PT EVAL LOW COMPLEXITY 1 8/9/2022 Kip Collins, PT GP 1          PT G-Codes  Outcome Measure Options: AM-PAC 6 Clicks Basic Mobility (PT)  AM-PAC 6 Clicks Score (PT): 24    PT Discharge Summary  Anticipated Discharge Disposition (PT): home with assist  Reason for Discharge: Independent, Discharge from facility  Discharge Destination: Home with assist    Kip Collins, PT  8/9/2022

## 2022-08-09 NOTE — PLAN OF CARE
Goal Outcome Evaluation:  Plan of Care Reviewed With: patient        Progress: improving  Outcome Evaluation: S/p right 1st rib resection. RIght chest tube placed to waterseal this AM, no air leak or fluctuation noted. Medicated frequently for pain, pain somewhat controlled. Significant other at bedside. PT VSS. Possible d/c today pending AM PCXR.

## 2022-08-10 ENCOUNTER — TRANSITIONAL CARE MANAGEMENT TELEPHONE ENCOUNTER (OUTPATIENT)
Dept: CALL CENTER | Facility: HOSPITAL | Age: 37
End: 2022-08-10

## 2022-08-10 NOTE — OUTREACH NOTE
Call Center TCM Note    Flowsheet Row Responses   [facility] patient discharged from? [place]   Does the patient have one of the following disease processes/diagnoses(primary or secondary)? Other   TCM attempt successful? Yes   Call start time 1214   Call end time 1223   Discharge diagnosis thyroid removal   Person spoke with today (if not patient) and relationship patient   Medication alerts for this patient patient had thyroid removed- not on any medication to replace thyroid hormone   Meds reviewed with patient/caregiver? Yes   Is the patient having any side effects they believe may be caused by any medication additions or changes? No   Does the patient have all medications ordered at discharge? Yes   Is the patient taking all medications as directed (includes completed medication regime)? Yes   Comments regarding appointments Scheduled patient hospital follow up this friday 08/12 at 230   Does the patient have a primary care provider?  Yes   Does the patient have an appointment with their PCP within 7 days of discharge? Yes   Comments regarding PCP rubin hurst is md for patient to follow up with   Has home health visited the patient within 72 hours of discharge? N/A   Nursing interventions Reviewed instructions with patient   What is the patient's perception of their health status since discharge? Improving   Is the patient/caregiver able to teach back signs and symptoms related to disease process for when to call PCP? Yes  [understands the importance of getting up and moving around post op to prevent blood clots and pneumonia]   Is the patient/caregiver able to teach back signs and symptoms related to disease process for when to call 911? Yes  [patient mentioned coughing up sputum that is blood tinged- surgeon is aware and states this is normal from procedure]   Is the patient/caregiver able to teach back the hierarchy of who to call/visit for symptoms/problems? PCP, Specialist, Home health nurse,  Urgent Care, ED, 911 Yes   TCM call completed? Yes          Melissa Gatica, RN    8/10/2022, 12:26 EDT

## 2022-08-12 ENCOUNTER — OFFICE VISIT (OUTPATIENT)
Dept: FAMILY MEDICINE CLINIC | Age: 37
End: 2022-08-12

## 2022-08-12 VITALS
DIASTOLIC BLOOD PRESSURE: 87 MMHG | SYSTOLIC BLOOD PRESSURE: 125 MMHG | WEIGHT: 140.4 LBS | HEIGHT: 65 IN | BODY MASS INDEX: 23.39 KG/M2 | TEMPERATURE: 99.2 F | HEART RATE: 64 BPM

## 2022-08-12 DIAGNOSIS — G89.29 CHRONIC RIGHT SHOULDER PAIN: Primary | ICD-10-CM

## 2022-08-12 DIAGNOSIS — M25.511 CHRONIC RIGHT SHOULDER PAIN: Primary | ICD-10-CM

## 2022-08-12 PROBLEM — N50.89 SCROTAL MASS: Status: RESOLVED | Noted: 2022-08-05 | Resolved: 2022-08-12

## 2022-08-12 PROBLEM — Z30.09 STERILIZATION CONSULT: Status: RESOLVED | Noted: 2022-08-05 | Resolved: 2022-08-12

## 2022-08-12 PROBLEM — J30.2 SEASONAL ALLERGIC RHINITIS: Status: ACTIVE | Noted: 2022-04-08

## 2022-08-12 PROCEDURE — 99213 OFFICE O/P EST LOW 20 MIN: CPT | Performed by: NURSE PRACTITIONER

## 2022-08-12 NOTE — ASSESSMENT & PLAN NOTE
He declines hospital follow-up visit.  He is being managed by cardiothoracic surgeon and by ear nose and throat specialist for his current conditions.  He states pain is controlled and is doing well other than fatigue.  He suspects his thyroid medication may need to be increased again after it is rechecked next month.  He states there is no other issues for management today.

## 2022-08-12 NOTE — PROGRESS NOTES
"Chief Complaint  Hospital Follow Up Visit (Thoracic outlet surgery 08.08.22)    Subjective  Patient is a 37-year-old male who states he is not sure why he is here other than someone called him and told him he had to be here.  He does not want today's visit unless it could be billed under Workmen's Compensation.  He recently had thoracic outlet syndrome procedure done on August 8.  He stayed overnight at [facility] in [place] and was discharged on August 9.  This was done due to chronic right shoulder pain that started after pulling motion at work in 2021.  A thyroid mass was noted on imaging as part of work-up for right shoulder pain.  The thyroid mass was deemed to be malignant and he had a total thyroidectomy and is currently managed by ENT, Dr. Beebe.  He is on Synthroid 50 mcg once daily and Dr. Ware has order for repeat thyroid levels approximately September 29.  Patient is following up with Dr. Gasca thoracic surgeon and has been attending pain management.  He expresses frustration of financial concerns due to not working in a pending Workmen's Compensation claim.        Juan M Lynch presents to [facility]          Objective   Vital Signs:   Vitals:    08/12/22 1439   BP: 125/87   BP Location: Right arm   Patient Position: Sitting   Cuff Size: Adult   Pulse: 64   Temp: 99.2 °F (37.3 °C)   TempSrc: Oral   Weight: 63.7 kg (140 lb 6.4 oz)   Height: 165.1 cm (65\")      Body mass index is 23.36 kg/m².  Physical Exam  Vitals reviewed.   Constitutional:       General: He is not in acute distress.     Appearance: Normal appearance. He is well-developed.   Cardiovascular:      Rate and Rhythm: Normal rate and regular rhythm.      Heart sounds: Normal heart sounds.   Pulmonary:      Effort: Pulmonary effort is normal.      Breath sounds: Normal breath sounds.   Skin:     General: Skin is warm and dry.   Neurological:      General: No focal deficit present.      Mental Status: " He is alert.   Psychiatric:         Attention and Perception: Attention normal.         Mood and Affect: Mood and affect normal.         Behavior: Behavior normal.          Result Review :                Assessment and Plan    Diagnoses and all orders for this visit:    1. Chronic right shoulder pain (Primary)  Assessment & Plan:  He declines hospital follow-up visit.  He is being managed by cardiothoracic surgeon and by ear nose and throat specialist for his current conditions.  He states pain is controlled and is doing well other than fatigue.  He suspects his thyroid medication may need to be increased again after it is rechecked next month.  He states there is no other issues for management today.        Follow Up    No follow-ups on file.  Patient was given instructions and counseling regarding his condition or for health maintenance advice. Please see specific information pulled into the AVS if appropriate.

## 2022-08-16 ENCOUNTER — TELEPHONE (OUTPATIENT)
Dept: UROLOGY | Facility: CLINIC | Age: 37
End: 2022-08-16

## 2022-08-19 DIAGNOSIS — G54.0 TOS (THORACIC OUTLET SYNDROME): Primary | ICD-10-CM

## 2022-08-19 RX ORDER — OXYCODONE HYDROCHLORIDE 5 MG/1
5 TABLET ORAL EVERY 4 HOURS PRN
Qty: 18 TABLET | Refills: 0 | Status: SHIPPED | OUTPATIENT
Start: 2022-08-19 | End: 2022-08-22

## 2022-08-23 ENCOUNTER — OFFICE VISIT (OUTPATIENT)
Dept: UROLOGY | Facility: CLINIC | Age: 37
End: 2022-08-23

## 2022-08-23 DIAGNOSIS — N50.89 SCROTAL MASS: Primary | ICD-10-CM

## 2022-08-23 PROCEDURE — 99024 POSTOP FOLLOW-UP VISIT: CPT | Performed by: UROLOGY

## 2022-08-23 NOTE — PROGRESS NOTES
Subjective         History of Present Illness:  Juan M Lynch is a 37 y.o. male who is here status post vasectomy. 0 sperm noted on a #20 power fields.         Assessment and Plan     Undesired fertility    Medications  • Medications have been reconciled.       Instructions    [x]   Instructed patient to follow-up as needed, counseled that he is okay to stop using birth control.    []   Patient to follow-up in 1 month for recheck, patient counseled to continue use birth control as he is still considered fertile and able to father children until recheck and given the okay to stop using birth control at that time. Patient voiced understanding.           Sarabjit Umanzor MD      TRIED CALLING PATIENT AT 0833, NO ANSWER AND VOICEMAIL BOX FULL

## 2022-08-23 NOTE — PROGRESS NOTES
Spoke with patient at 0840, he is aware there were no sperm and he is okay to stop using birth control. Patient voiced understanding.

## 2022-08-29 ENCOUNTER — HOSPITAL ENCOUNTER (OUTPATIENT)
Dept: GENERAL RADIOLOGY | Facility: HOSPITAL | Age: 37
Discharge: HOME | End: 2022-08-29
Admitting: NURSE PRACTITIONER

## 2022-08-29 DIAGNOSIS — G54.0 TOS (THORACIC OUTLET SYNDROME): ICD-10-CM

## 2022-08-29 PROCEDURE — 71046 X-RAY EXAM CHEST 2 VIEWS: CPT

## 2022-08-30 ENCOUNTER — OFFICE VISIT (OUTPATIENT)
Dept: SURGERY | Facility: CLINIC | Age: 37
End: 2022-08-30

## 2022-08-30 VITALS
HEIGHT: 65 IN | HEART RATE: 65 BPM | WEIGHT: 140 LBS | SYSTOLIC BLOOD PRESSURE: 124 MMHG | BODY MASS INDEX: 23.32 KG/M2 | OXYGEN SATURATION: 98 % | DIASTOLIC BLOOD PRESSURE: 82 MMHG

## 2022-08-30 DIAGNOSIS — Z09 FOLLOW-UP EXAMINATION FOLLOWING SURGERY: ICD-10-CM

## 2022-08-30 DIAGNOSIS — G54.0 TOS (THORACIC OUTLET SYNDROME): Primary | ICD-10-CM

## 2022-08-30 PROCEDURE — 99024 POSTOP FOLLOW-UP VISIT: CPT | Performed by: THORACIC SURGERY (CARDIOTHORACIC VASCULAR SURGERY)

## 2022-08-30 NOTE — PROGRESS NOTES
"Chief Complaint  First postoperative visit    Subjective        Juan M Lynch presents to [facility]  History of Present Illness     Mr. Lynch was seen in the office today for his first follow-up visit following a right VAT with right first rib resection performed August 8, 2022 for neurogenic thoracic outlet syndrome.  He has been doing quite well.  He is resuming normal activities and seems to be doing well.  A few days after leaving the hospital he sneezed hard and felt a pop in his chest.  Since then he has had some pain and numbness along his pectoralis muscle.  His preoperative symptoms are almost completely improved except for some minor numbness and paresthesias in the fingertips.  It hurts when he takes a deep breath but he does not have any real shortness of breath.  He has not been started on physical therapy as yet.    Objective   Vital Signs:  /82 (BP Location: Left arm, Patient Position: Sitting, Cuff Size: Adult)   Pulse 65   Ht 165.1 cm (65\")   Wt 63.5 kg (140 lb)   SpO2 98%   BMI 23.30 kg/m²   Estimated body mass index is 23.3 kg/m² as calculated from the following:    Height as of this encounter: 165.1 cm (65\").    Weight as of this encounter: 63.5 kg (140 lb).    BMI is within normal parameters. No other follow-up for BMI required.      Physical Exam     Neck: Full range of motion without pain.  Tender in the right supraclavicular fossa.  No tenderness on on the left.  No masses and no adenopathy    Chest: Incisions are clean dry and well-healed.  No subcutaneous masses or nodules.  Chest wall is stable.  Full range of motion in both shoulders.  Some tenderness in the right deltopectoral groove.  No tenderness in the left deltopectoral groove.    Musculoskeletal: Normal strength in all muscle groups tested in both upper extremities.   is normal bilaterally.    Neuro: Deep tendon reflexes 2+ and equal bilaterally in both upper extremities.  Touch " and fine motor function are intact bilaterally        .  Result Review :  The following data was reviewed by: Manan Gasca III, MD on 08/30/2022:    Chest x-ray performed today was independently reviewed and compared to previous x-rays.  Surgical absence of the right first rib.  No pneumothorax.  No pleural effusions.  No pulmonary infiltrates.  Left lung is clear.         Assessment and Plan   Diagnoses and all orders for this visit:    1. TOS (thoracic outlet syndrome) (Primary)    2. Follow-up examination following surgery      Patient is making a good recovery from his surgery.  Preoperative symptoms are improved.  We will start physical therapy 2 times per week for the next 4 weeks.  He will return to see me in 4 weeks at which time we will make a determination about return to work.  I anticipate return to work in about 6 weeks given the type of work that he does.       I spent 18 minutes caring for Juan M on this date of service. This time includes time spent by me in the following activities:preparing for the visit, reviewing tests, performing a medically appropriate examination and/or evaluation , counseling and educating the patient/family/caregiver, ordering medications, tests, or procedures, referring and communicating with other health care professionals , documenting information in the medical record, independently interpreting results and communicating that information with the patient/family/caregiver and care coordination  Follow Up   Return in about 4 weeks (around 9/27/2022) for Recheck.  Patient was given instructions and counseling regarding his condition or for health maintenance advice. Please see specific information pulled into the AVS if appropriate.

## 2022-09-27 ENCOUNTER — LAB (OUTPATIENT)
Dept: LAB | Facility: HOSPITAL | Age: 37
End: 2022-09-27

## 2022-09-27 DIAGNOSIS — E89.0 POSTOPERATIVE HYPOTHYROIDISM: ICD-10-CM

## 2022-09-27 LAB
T4 FREE SERPL-MCNC: 1.41 NG/DL (ref 0.93–1.7)
TSH SERPL DL<=0.05 MIU/L-ACNC: 2.32 UIU/ML (ref 0.27–4.2)

## 2022-09-27 PROCEDURE — 84439 ASSAY OF FREE THYROXINE: CPT

## 2022-09-27 PROCEDURE — 84443 ASSAY THYROID STIM HORMONE: CPT

## 2022-09-27 PROCEDURE — 36415 COLL VENOUS BLD VENIPUNCTURE: CPT

## 2022-10-18 ENCOUNTER — LAB (OUTPATIENT)
Dept: LAB | Facility: HOSPITAL | Age: 37
End: 2022-10-18

## 2022-10-18 ENCOUNTER — OFFICE VISIT (OUTPATIENT)
Dept: SURGERY | Facility: CLINIC | Age: 37
End: 2022-10-18

## 2022-10-18 VITALS
DIASTOLIC BLOOD PRESSURE: 90 MMHG | BODY MASS INDEX: 23.32 KG/M2 | SYSTOLIC BLOOD PRESSURE: 132 MMHG | WEIGHT: 139.99 LBS | HEART RATE: 69 BPM | OXYGEN SATURATION: 98 % | HEIGHT: 65 IN

## 2022-10-18 DIAGNOSIS — G89.29 CHRONIC MIDLINE THORACIC BACK PAIN: ICD-10-CM

## 2022-10-18 DIAGNOSIS — R53.83 OTHER FATIGUE: ICD-10-CM

## 2022-10-18 DIAGNOSIS — G54.0 TOS (THORACIC OUTLET SYNDROME): Primary | ICD-10-CM

## 2022-10-18 DIAGNOSIS — M54.6 CHRONIC MIDLINE THORACIC BACK PAIN: ICD-10-CM

## 2022-10-18 PROCEDURE — 86769 SARS-COV-2 COVID-19 ANTIBODY: CPT

## 2022-10-18 PROCEDURE — 99024 POSTOP FOLLOW-UP VISIT: CPT | Performed by: THORACIC SURGERY (CARDIOTHORACIC VASCULAR SURGERY)

## 2022-10-18 PROCEDURE — 36415 COLL VENOUS BLD VENIPUNCTURE: CPT

## 2022-10-18 NOTE — PROGRESS NOTES
"Chief Complaint  Follow-up first rib resection for thoracic outlet syndrome    Subjective        Juan M Lynch presents to [facility] THORACIC SURGERY  History of Present Illness     Juan M Lynch was seen in our office today for further follow-up of a right VAT with right first rib resection performed August 8, 2022.  Most of patient's complaints at this time centered around upper thoracic spine.  His preoperative symptoms have improved quite a bit.  He is having no chest wall surgical pain.  He complains of fatigue and no energy.  He thinks he may have had COVID sometime in the past because he is never been vaccinated.  He is concerned that he may have symptoms of long COVID.  He has been doing his physical therapy and has improved range of motion and strength in his arm and hand.    Objective   Vital Signs:  /90 (BP Location: Right arm, Patient Position: Sitting, Cuff Size: Adult)   Pulse 69   Ht 165.1 cm (65\")   Wt 63.5 kg (139 lb 15.9 oz)   SpO2 98%   BMI 23.30 kg/m²   Estimated body mass index is 23.3 kg/m² as calculated from the following:    Height as of this encounter: 165.1 cm (65\").    Weight as of this encounter: 63.5 kg (139 lb 15.9 oz).    BMI is within normal parameters. No other follow-up for BMI required.      Physical Exam     Neck: Full range of motion.  No tenderness in the right or left supraclavicular fossa.  No masses and no adenopathy.    Chest: Incisions are well-healed.  No subcutaneous masses or nodules.  Good range of motion in both shoulders.  Mild tenderness in the right deltopectoral groove.  No tenderness in the left deltopectoral groove.  Thoracic spine is in good alignment.  Some tenderness over the fourth and fifth vertebral bodies with no masses or other abnormalities.  Symmetrical motion of both scapula with no winging.    Musculoskeletal: Normal strength in all muscle groups tested in both upper extremities.   is normal bilaterally.    Neuro: " Deep tendon reflexes are 2+ and equal bilaterally in the upper extremities.  Touch and fine motor function are intact bilaterally.    Result Review :    No current tests for review today         Assessment and Plan   Diagnoses and all orders for this visit:    1. TOS (thoracic outlet syndrome) (Primary)  -     CT Thoracic Spine With Contrast; Future    2. Chronic midline thoracic back pain  -     CT Thoracic Spine With Contrast; Future    3. Other fatigue  -     SARS-CoV-2 Antibodies (Roche); Future      Since the patient is concerned about COVID infection and long COVID symptoms I have ordered COVID antibodies.  I have also ordered a CT scan of his thoracic spine.  A lot of the symptoms he is now complaining of cannot be attributed to thoracic outlet syndrome or to the surgery of first rib resection.  We will continue the physical therapy and reevaluate the patient in 4 weeks.  I will keep you informed of his progress.     I spent 26 minutes caring for Juan M on this date of service. This time includes time spent by me in the following activities:preparing for the visit, reviewing tests, performing a medically appropriate examination and/or evaluation , counseling and educating the patient/family/caregiver, ordering medications, tests, or procedures, referring and communicating with other health care professionals , documenting information in the medical record, independently interpreting results and communicating that information with the patient/family/caregiver and care coordination  Follow Up   Return in about 4 weeks (around 11/15/2022) for Recheck.  Patient was given instructions and counseling regarding his condition or for health maintenance advice. Please see specific information pulled into the AVS if appropriate.

## 2022-10-19 LAB — SARS-COV-2 AB SERPL QL IA: POSITIVE

## 2022-10-24 ENCOUNTER — APPOINTMENT (OUTPATIENT)
Dept: CT IMAGING | Facility: HOSPITAL | Age: 37
End: 2022-10-24

## 2022-10-31 ENCOUNTER — APPOINTMENT (OUTPATIENT)
Dept: CT IMAGING | Facility: HOSPITAL | Age: 37
End: 2022-10-31

## 2022-11-01 ENCOUNTER — HOSPITAL ENCOUNTER (OUTPATIENT)
Dept: CT IMAGING | Facility: HOSPITAL | Age: 37
Discharge: HOME | End: 2022-11-01
Admitting: THORACIC SURGERY (CARDIOTHORACIC VASCULAR SURGERY)

## 2022-11-01 ENCOUNTER — OFFICE VISIT (OUTPATIENT)
Dept: FAMILY MEDICINE CLINIC | Age: 37
End: 2022-11-01

## 2022-11-01 VITALS
BODY MASS INDEX: 23.26 KG/M2 | DIASTOLIC BLOOD PRESSURE: 88 MMHG | HEART RATE: 70 BPM | SYSTOLIC BLOOD PRESSURE: 130 MMHG | WEIGHT: 139.8 LBS

## 2022-11-01 DIAGNOSIS — M54.6 CHRONIC MIDLINE THORACIC BACK PAIN: ICD-10-CM

## 2022-11-01 DIAGNOSIS — G54.0 TOS (THORACIC OUTLET SYNDROME): ICD-10-CM

## 2022-11-01 DIAGNOSIS — G89.29 CHRONIC MIDLINE THORACIC BACK PAIN: ICD-10-CM

## 2022-11-01 DIAGNOSIS — L30.9 DERMATITIS: Primary | ICD-10-CM

## 2022-11-01 PROCEDURE — 71260 CT THORAX DX C+: CPT

## 2022-11-01 PROCEDURE — 0 IOPAMIDOL PER 1 ML: Performed by: THORACIC SURGERY (CARDIOTHORACIC VASCULAR SURGERY)

## 2022-11-01 PROCEDURE — 99213 OFFICE O/P EST LOW 20 MIN: CPT | Performed by: NURSE PRACTITIONER

## 2022-11-01 RX ADMIN — IOPAMIDOL 100 ML: 755 INJECTION, SOLUTION INTRAVENOUS at 16:31

## 2022-11-04 ENCOUNTER — TELEPHONE (OUTPATIENT)
Dept: SURGERY | Facility: CLINIC | Age: 37
End: 2022-11-04

## 2022-11-08 ENCOUNTER — OFFICE VISIT (OUTPATIENT)
Dept: SURGERY | Facility: CLINIC | Age: 37
End: 2022-11-08

## 2022-11-08 ENCOUNTER — TELEPHONE (OUTPATIENT)
Dept: SURGERY | Facility: CLINIC | Age: 37
End: 2022-11-08

## 2022-11-08 VITALS
OXYGEN SATURATION: 98 % | HEART RATE: 64 BPM | BODY MASS INDEX: 23.32 KG/M2 | HEIGHT: 65 IN | DIASTOLIC BLOOD PRESSURE: 80 MMHG | SYSTOLIC BLOOD PRESSURE: 114 MMHG | WEIGHT: 140 LBS

## 2022-11-08 DIAGNOSIS — M54.6 CHRONIC MIDLINE THORACIC BACK PAIN: Primary | ICD-10-CM

## 2022-11-08 DIAGNOSIS — G89.29 CHRONIC MIDLINE THORACIC BACK PAIN: Primary | ICD-10-CM

## 2022-11-08 DIAGNOSIS — G54.0 TOS (THORACIC OUTLET SYNDROME): ICD-10-CM

## 2022-11-08 DIAGNOSIS — Z09 FOLLOW-UP EXAMINATION FOLLOWING SURGERY: ICD-10-CM

## 2022-11-08 PROCEDURE — 99212 OFFICE O/P EST SF 10 MIN: CPT | Performed by: THORACIC SURGERY (CARDIOTHORACIC VASCULAR SURGERY)

## 2022-11-08 NOTE — TELEPHONE ENCOUNTER
Caller: Juan M Lynch    Relationship: Self    Best call back number: [phone]     What is the best time to reach you: ANY    Who are you requesting to speak with (clinical staff, provider,  specific staff member): CLINICAL STAFF    Do you know the name of the person who called: PT  What was the call regarding: PT WANTED TO KNOW IF HE NEEDED TO CONTINUE PHYSICAL THERAPY-HIS  WANTED HIM TO ASK-HE HAS COMPLETED 13 OF 15 VISITS  Do you require a callback: YES

## 2022-11-08 NOTE — PROGRESS NOTES
"Chief Complaint  Upper back pain    Subjective        Juan M Lynch presents to [facility] THORACIC SURGERY  History of Present Illness     Juan M Lynch was seen in our office today for further follow-up of a right VAT with right first rib resection performed August 8, 2022 for neurogenic thoracic outlet syndrome.  Preoperative symptoms have almost completely resolved with the surgery.  He has had some intercostal nerve neuralgia.  Symptoms now are pain in the upper thoracic spine area between the spine and the scapula.  He has been doing physical therapy 2 days/week with mixed results.  Dry needling performed at physical therapy gives him the most relief.  He has had a CT of the chest to evaluate for thoracic outlet area as well as the thoracic spine.    Objective   Vital Signs:  /80 (BP Location: Left arm, Patient Position: Sitting, Cuff Size: Adult)   Pulse 64   Ht 165.1 cm (65\")   Wt 63.5 kg (140 lb)   SpO2 98%   BMI 23.30 kg/m²   Estimated body mass index is 23.3 kg/m² as calculated from the following:    Height as of this encounter: 165.1 cm (65\").    Weight as of this encounter: 63.5 kg (140 lb).    BMI is within normal parameters. No other follow-up for BMI required.      Physical Exam   Neck: Full range of motion.  No tenderness in the right or left supraclavicular fossa.  No masses and no adenopathy.     Chest: Incisions are well-healed.  No subcutaneous masses or nodules.  Good range of motion in both shoulders.  Mild tenderness in the right deltopectoral groove.  No tenderness in the left deltopectoral groove.  Thoracic spine is in good alignment.  Some tenderness over the fourth and fifth vertebral bodies with no masses or other abnormalities.  Symmetrical motion of both scapula with no winging.     Musculoskeletal: Normal strength in all muscle groups tested in both upper extremities.   is normal bilaterally.     Neuro: Deep tendon reflexes are 2+ and equal " bilaterally in the upper extremities.  Touch and fine motor function are intact bilaterally.    Result Review :  The following data was reviewed by: Manan Gasca III, MD on 11/08/2022:    CT of the chest with contrast performed on November 1, 2022 was independently reviewed.  Operative area of right first rib resection appears normal.  There are no masses or fluid collections.  Thoracic vertebral bodies are well aligned with no evidence of fracture.  Disc height is preserved.  No pulmonary abnormalities.         Assessment and Plan   Diagnoses and all orders for this visit:    1. Chronic midline thoracic back pain (Primary)  -     Ambulatory Referral to Pain Management    2. TOS (thoracic outlet syndrome)    3. Follow-up examination following surgery  -     Ambulatory Referral to Pain Management      No good explanation for his current symptoms.  Symptoms are not consistent with thoracic outlet syndrome and in fact those symptoms have resolved with the surgery.  Nothing on the CT scan to explain the symptoms as well.  Will refer patient to pain management for their evaluation and treatment.  Patient will return to see me in 6 weeks.      I spent 15 minutes caring for Juan M on this date of service. This time includes time spent by me in the following activities:preparing for the visit, reviewing tests, performing a medically appropriate examination and/or evaluation , counseling and educating the patient/family/caregiver, ordering medications, tests, or procedures, referring and communicating with other health care professionals , documenting information in the medical record, independently interpreting results and communicating that information with the patient/family/caregiver and care coordination  Follow Up   Return in about 6 weeks (around 12/20/2022) for Recheck.  Patient was given instructions and counseling regarding his condition or for health maintenance advice. Please see specific information pulled  into the AVS if appropriate.

## 2022-11-23 ENCOUNTER — OFFICE VISIT (OUTPATIENT)
Dept: OTOLARYNGOLOGY | Facility: CLINIC | Age: 37
End: 2022-11-23

## 2022-11-23 VITALS — TEMPERATURE: 97.6 F | BODY MASS INDEX: 23.32 KG/M2 | HEIGHT: 65 IN | WEIGHT: 140 LBS

## 2022-11-23 DIAGNOSIS — E89.0 POSTOPERATIVE HYPOTHYROIDISM: ICD-10-CM

## 2022-11-23 DIAGNOSIS — J30.1 SEASONAL ALLERGIC RHINITIS DUE TO POLLEN: Primary | ICD-10-CM

## 2022-11-23 DIAGNOSIS — E03.9 ACQUIRED HYPOTHYROIDISM: ICD-10-CM

## 2022-11-23 DIAGNOSIS — J34.89 NASAL CRUSTING: ICD-10-CM

## 2022-11-23 PROCEDURE — 99214 OFFICE O/P EST MOD 30 MIN: CPT | Performed by: OTOLARYNGOLOGY

## 2022-11-23 RX ORDER — LEVOTHYROXINE SODIUM 50 MCG
TABLET ORAL
Qty: 30 TABLET | Refills: 3 | Status: SHIPPED | OUTPATIENT
Start: 2022-11-23

## 2022-11-23 NOTE — PROGRESS NOTES
Patient Name: Juan M Lynch   Visit Date: 11/23/2022   Patient ID: [id number]  Provider: Reji Beebe MD    Sex: male  [place] [facility]   YOB: 1985  Location Address: [address] [place] [address], [address],?[place]?[id number]    Primary Care Provider Zoila Mckinney APRN  Location Phone: [phone]    Referring Provider: No ref. provider found        Chief Complaint  Nose Bleed (FOLLOW UP, NOSE BLEED)    Subjective    History of Present Illness  Juan M Lynch is a 37 y.o. male who presents to [facility] today as a consult from No ref. provider found.    He presents to the clinic today for follow-up regarding hypothyroidism and evaluation for new issue of right-sided nasal crusting and bleeding.  I last saw him in June, and he notes that he has been doing well in general, but has had some issues with intermittent right-sided nasal bleeding.  He noted across the along the anterior portion of the right nostril that he has been picking at.  Denies any major issues with nasal obstruction, but does note that he does not breathe quite as well through the right nostril.  He underwent right thyroid lobe and isthmus excision for what turned out to be benign disease, and has had no issues with the surgical site.  He notes that his voice is normal and has had no issues with eating or swallowing.  His last blood work indicates that his Synthroid is in the correct level with both TSH and free T4 within normal limits.  He does note some fatigue issues but has been through a lot lately with having surgery for his thyroid and treatment for thoracic outlet syndrome with removal of his first rib on the right side.    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   • Thoracic outlet syndrome 08/01/2022   • Thyroid cancer (HCC)    • Thyroid nodule        Past Surgical  "History:   Procedure Laterality Date   • ELBOW PROCEDURE     • FIRST RIB RESECTION Right 8/8/2022    Procedure: BRONCHOSCOPY, RIGHT THORACOSCOPY VIDEO ASSISTED WITH RESECTION OF RIGHT FIRST RIB, INTERCOSTAL NERVE BLOCK;  Surgeon: Manan Gasca III, MD;  Location: [facility] MA[facility] OR;  Service: Thoracic;  Laterality: Right;   • NASAL ENDOSCOPY N/A 5/16/2022    Procedure: NASAL ENDOSCOPY;  Surgeon: Reji Beebe MD;  Location: [facility];  Service: ENT;  Laterality: N/A;   • THYROIDECTOMY Right 5/16/2022    Procedure: Right THYROIDECTOMY, excision of nasal polyps;  Surgeon: Reji Beebe MD;  Location: [facility];  Service: ENT;  Laterality: Right;   • VASECTOMY           Current Outpatient Medications:   •  hydrocortisone 2.5 % cream, Apply 1 application topically to the appropriate area as directed 2 (Two) Times a Day., Disp: 28 g, Rfl: 0  •  Synthroid 50 MCG tablet, TAKE ONE TABLET BY MOUTH DAILY, Disp: 30 tablet, Rfl: 3     Allergies   Allergen Reactions   • Robitussin Severe Cgh-Sr Thrt [Acetaminophen-Dm] Anaphylaxis       Family History   Problem Relation Age of Onset   • No Known Problems Mother    • No Known Problems Father    • Malig Hyperthermia Neg Hx         Social History     Social History Narrative   • Not on file       Objective     Vital Signs:   Temp 97.6 °F (36.4 °C)   Ht 165.1 cm (65\")   Wt 63.5 kg (140 lb)   BMI 23.30 kg/m²       Physical Exam         Constitutional   Appearance  · : well developed, well-nourished, alert and in no acute distress, voice clear and strong    Head  Inspection  · : no deformities or lesions  Face  Inspection  · : No facial lesions; House-Brackmann I/VI bilaterally  Palpation  · : No TMJ crepitus nor  muscle tenderness bilaterally    Eyes  Vision  Visual Fields  · : Extraocular movements are intact. No spontaneous or gaze-induced nystagmus.  Conjunctivae  · : clear  Sclerae  · : clear  Pupils and Irises  · : pupils equal, round, and " reactive to light.     Ears, Nose, Mouth and Throat    Ears    External Ears  · : appearance within normal limits, no lesions present  Otoscopic Examination  · : Tympanic membrane appearance within normal limits bilaterally without perforations, well-aerated middle ears  Hearing  · : intact to conversational voice both ears  Tunning fork testing:     :    Nose    External Nose  · : appearance normal  Intranasal Exam  · : mucosa with mild crusting on the right, no evidence of ulceration, vestibules normal, no intranasal lesions present, septum midline, sinuses non tender to percussion  Oral Cavity    Oral Mucosa  · : oral mucosa normal without pallor or cyanosis  Lips  · : lip appearance normal  Teeth  · : normal dentition for age  Gums  · : gums pink, non-swollen, no bleeding present  Tongue  · : tongue appearance normal; normal mobility  Palate  · : hard palate normal, soft palate appearance normal with symmetric mobility    Throat    Oropharynx  · : no inflammation or lesions present, tonsils within normal limits  Hypopharynx  · : appearance within normal limits, superior epiglottis within normal limits  Larynx  · : appearance within normal limits, vocal cords within normal limits, no lesions present    Neck  Inspection/Palpation  · : Well-healed surgical site, no lymphadenopathy, normal appearance, no masses or tenderness, trachea midline; thyroid size normal, nontender, no nodules or masses present on palpation    Respiratory  Respiratory Effort  · : breathing unlabored  Inspection of Chest  · : normal appearance, no retractions    Cardiovascular  Heart  · : regular rate and rhythm    Lymphatic  Neck  · : no lymphadenopathy present  Supraclavicular Nodes  · : no lymphadenopathy present  Preauricular Nodes  · : no lymphadenopathy present    Skin and Subcutaneous Tissue  General Inspection  · : Regarding face and neck - there are no rashes present, no lesions present, and no areas of  discoloration    Neurologic  Cranial Nerves  · : cranial nerves II-XII are grossly intact bilaterally  Gait and Station  · : normal gait, able to stand without diffculty    Psychiatric  Judgement and Insight  · : judgment and insight intact  Mood and Affect  · : mood normal, affect appropriate          Assessment and Plan    Diagnoses and all orders for this visit:    1. Seasonal allergic rhinitis due to pollen (Primary)    2. Nasal crusting    3. Acquired hypothyroidism    Examination today revealed well-healed thyroidectomy site with no lymphadenopathy.  Nasal exam revealed crusting along the right side with no evidence of blood or bleeding.  I recommended he use nasal saline gel and nasal saline irrigations and avoid traumatizing the area by picking.  He will stay on his course of Synthroid I will have his primary care physician manage this since it is stable.  I did recommend thyroid function testing with his next set of blood work.  I will see him back on an as-needed basis should there be any further issues.    Follow Up   No follow-ups on file.  Patient was given instructions and counseling regarding his condition or for health maintenance advice. Please see specific information pulled into the AVS if appropriate.

## 2022-12-14 ENCOUNTER — TELEPHONE (OUTPATIENT)
Dept: SURGERY | Facility: CLINIC | Age: 37
End: 2022-12-14

## 2023-01-09 ENCOUNTER — TELEMEDICINE (OUTPATIENT)
Dept: FAMILY MEDICINE CLINIC | Age: 38
End: 2023-01-09
Payer: MEDICAID

## 2023-01-09 VITALS
WEIGHT: 144 LBS | HEART RATE: 63 BPM | SYSTOLIC BLOOD PRESSURE: 136 MMHG | OXYGEN SATURATION: 100 % | HEIGHT: 66 IN | DIASTOLIC BLOOD PRESSURE: 80 MMHG | BODY MASS INDEX: 23.14 KG/M2 | TEMPERATURE: 98.7 F

## 2023-01-09 DIAGNOSIS — R09.82 PND (POST-NASAL DRIP): ICD-10-CM

## 2023-01-09 DIAGNOSIS — R05.1 ACUTE COUGH: ICD-10-CM

## 2023-01-09 DIAGNOSIS — J06.9 ACUTE URI: Primary | ICD-10-CM

## 2023-01-09 DIAGNOSIS — H66.90 ACUTE OTITIS MEDIA, UNSPECIFIED OTITIS MEDIA TYPE: ICD-10-CM

## 2023-01-09 PROCEDURE — 99213 OFFICE O/P EST LOW 20 MIN: CPT

## 2023-01-09 RX ORDER — FLUTICASONE PROPIONATE 50 MCG
2 SPRAY, SUSPENSION (ML) NASAL DAILY
Qty: 15.8 ML | Refills: 0 | Status: SHIPPED | OUTPATIENT
Start: 2023-01-09 | End: 2023-01-27

## 2023-01-09 RX ORDER — AMOXICILLIN AND CLAVULANATE POTASSIUM 875; 125 MG/1; MG/1
1 TABLET, FILM COATED ORAL 2 TIMES DAILY
Qty: 14 TABLET | Refills: 0 | Status: SHIPPED | OUTPATIENT
Start: 2023-01-09 | End: 2023-01-16

## 2023-01-09 RX ORDER — BENZONATATE 100 MG/1
100 CAPSULE ORAL 3 TIMES DAILY PRN
Qty: 15 CAPSULE | Refills: 0 | Status: SHIPPED | OUTPATIENT
Start: 2023-01-09 | End: 2023-01-16

## 2023-01-09 NOTE — PROGRESS NOTES
Subjective     CHIEF COMPLAINT    Chief Complaint   Patient presents with   • Fever     Congestion, green mucus when blowing nose, evening he gets a tickle in his throat, coughing a lot. Earaches moving from left to right never both at one time. 1/3/23 headaches and body aches started as well as chills.        History of Present Illness  Patient is a 37-year-old male who initially presented as a telehealth visit but then came to the office to be seen for complaints of congestion, cough, ear pain, ear discharge and sinus pressure.  He denies any fever or chills.  He denies any shortness of breath, chest pain or wheezing.  He reports that last Tuesday he had body aches, chills and a headache, he took 5 days worth of Tamiflu and his symptoms resolved.  He was never officially diagnosed with flu. Felt better within 24 hours of that.  Now he is having ear pain and discharge and a cough.  He has been taking Tylenol Cold and flu.       Review of Systems   Constitutional: Negative for chills and fever.   HENT: Positive for congestion, ear discharge, ear pain and sinus pressure. Negative for sore throat.    Respiratory: Positive for cough. Negative for shortness of breath and wheezing.    Cardiovascular: Negative for chest pain.       Allergies   Allergen Reactions   • Robitussin Severe Cgh-Sr Thrt [Acetaminophen-Dm] Anaphylaxis         Current Outpatient Medications on File Prior to Visit   Medication Sig Dispense Refill   • hydrocortisone 2.5 % cream Apply 1 application topically to the appropriate area as directed 2 (Two) Times a Day. 28 g 0   • Synthroid 50 MCG tablet TAKE ONE TABLET BY MOUTH DAILY 30 tablet 3     No current facility-administered medications on file prior to visit.       /80 (BP Location: Right arm, Patient Position: Sitting, Cuff Size: Adult)   Pulse 63   Temp 98.7 °F (37.1 °C) (Oral)   Ht 167.6 cm (65.98\")   Wt 65.3 kg (144 lb)   SpO2 100%   BMI 23.25 kg/m²     Objective     Physical  Exam  Vitals and nursing note reviewed.   Constitutional:       General: He is not in acute distress.     Appearance: Normal appearance. He is not ill-appearing.   HENT:      Head: Normocephalic and atraumatic.      Right Ear: Ear canal and external ear normal. A middle ear effusion is present.      Left Ear: Ear canal and external ear normal. Drainage present. A middle ear effusion is present. Tympanic membrane is injected and erythematous.      Nose: Nose normal.      Right Sinus: No maxillary sinus tenderness or frontal sinus tenderness.      Left Sinus: No maxillary sinus tenderness or frontal sinus tenderness.      Mouth/Throat:      Mouth: Mucous membranes are moist.      Pharynx: No posterior oropharyngeal erythema.   Eyes:      Pupils: Pupils are equal, round, and reactive to light.   Cardiovascular:      Rate and Rhythm: Normal rate and regular rhythm.   Pulmonary:      Effort: Pulmonary effort is normal. No accessory muscle usage or respiratory distress.      Breath sounds: Normal breath sounds.   Lymphadenopathy:      Cervical: No cervical adenopathy.   Skin:     General: Skin is warm and dry.   Neurological:      General: No focal deficit present.      Mental Status: He is alert and oriented to person, place, and time.   Psychiatric:         Mood and Affect: Mood and affect normal.             Diagnoses and all orders for this visit:    1. Acute URI (Primary)  Comments:  Increase fluids, rest.   Orders:  -     amoxicillin-clavulanate (Augmentin) 875-125 MG per tablet; Take 1 tablet by mouth 2 (Two) Times a Day for 7 days.  Dispense: 14 tablet; Refill: 0    2. Acute otitis media, unspecified otitis media type  -     amoxicillin-clavulanate (Augmentin) 875-125 MG per tablet; Take 1 tablet by mouth 2 (Two) Times a Day for 7 days.  Dispense: 14 tablet; Refill: 0    3. Acute cough  -     benzonatate (Tessalon Perles) 100 MG capsule; Take 1 capsule by mouth 3 (Three) Times a Day As Needed for Cough.  Dispense:  15 capsule; Refill: 0    4. PND (post-nasal drip)  -     fluticasone (FLONASE) 50 MCG/ACT nasal spray; 2 sprays into the nostril(s) as directed by provider Daily.  Dispense: 15.8 mL; Refill: 0      Patient declined COVID and flu testing today. Treatment as above. If symptoms worsen or do not improve, patient is to return to clinic. Patient voiced understanding of all instructions and had no further questions at this time.     Follow up:   Return if symptoms worsen or fail to improve.  Patient was given instructions and counseling regarding his condition or for health maintenance advice. Please see specific information pulled into the AVS if appropriate.

## 2023-01-12 ENCOUNTER — TELEPHONE (OUTPATIENT)
Dept: FAMILY MEDICINE CLINIC | Age: 38
End: 2023-01-12

## 2023-01-12 NOTE — TELEPHONE ENCOUNTER
PATIENT WAS SEEN BY CHIDI ON 01/09/2023 THIS MESSAGE MAY NEED TO GO TO HER    Caller: Juan M Lynch    Relationship: Self    Best call back number: [phone]    What medication are you requesting: SOMETHING TO HELP WITH HEARING IN LEFT EAR    What are your current symptoms: STILL HAVING TROUBLE HEARING IN LEFT EAR    If a prescription is needed, what is your preferred pharmacy and phone number: [id number] - [place], [place] - [address] [facility] [phone] [facility] [phone] FX     Additional notes: PATIENT IS STILL TAKING THE ANTIBIOTICS AND THEY HAVE HELPED EVERY THING EXCEPT THE EAR PLEASE CALL AND ADVISE IF A NEW MEDICATION CAN BE PRESCRIBED OR HE NEEDS TO SEE AN ENT

## 2023-01-16 ENCOUNTER — OFFICE VISIT (OUTPATIENT)
Dept: FAMILY MEDICINE CLINIC | Age: 38
End: 2023-01-16
Payer: MEDICAID

## 2023-01-16 VITALS
OXYGEN SATURATION: 98 % | WEIGHT: 150.6 LBS | HEART RATE: 68 BPM | BODY MASS INDEX: 24.2 KG/M2 | DIASTOLIC BLOOD PRESSURE: 93 MMHG | TEMPERATURE: 98.5 F | SYSTOLIC BLOOD PRESSURE: 130 MMHG | HEIGHT: 66 IN

## 2023-01-16 DIAGNOSIS — H91.92 HEARING LOSS OF LEFT EAR, UNSPECIFIED HEARING LOSS TYPE: Primary | ICD-10-CM

## 2023-01-16 PROCEDURE — 99213 OFFICE O/P EST LOW 20 MIN: CPT | Performed by: NURSE PRACTITIONER

## 2023-01-16 NOTE — TELEPHONE ENCOUNTER
Pt states he is completely deaf in his Left ear now.  He is not wanting a medication change.  He is wanting a referral to an ENT.  He is getting very concerned.  Please advise.

## 2023-01-16 NOTE — TELEPHONE ENCOUNTER
I do not advise any specific changes to his medications at this time. Please make sure he is taking the Flonase as prescribed.

## 2023-01-16 NOTE — PROGRESS NOTES
"Chief Complaint  audiology (Pt is requesting a hearing test bc he thinks his hearing is completely gone )    Subjective        Juan M Lynch presents to [facility]  Hearing Problem  This is a new problem. The current episode started 1 to 4 weeks ago. The problem occurs constantly. Associated symptoms include congestion and coughing. Nothing aggravates the symptoms. He has tried nothing for the symptoms. The treatment provided no relief.   On last day of amoxicillin. Cannot hear out of ear left ear. Needs hearing test to get back into ENT. Taking Flonase.    Objective   Vital Signs:  /93 (BP Location: Right arm, Patient Position: Sitting, Cuff Size: Adult)   Pulse 68   Temp 98.5 °F (36.9 °C) (Oral)   Ht 167.6 cm (65.98\")   Wt 68.3 kg (150 lb 9.6 oz)   SpO2 98% Comment: room air  BMI 24.32 kg/m²   Estimated body mass index is 24.32 kg/m² as calculated from the following:    Height as of this encounter: 167.6 cm (65.98\").    Weight as of this encounter: 68.3 kg (150 lb 9.6 oz).       BMI is within normal parameters. No other follow-up for BMI required.      Physical Exam  HENT:      Head: Normocephalic.      Right Ear: Hearing, tympanic membrane and ear canal normal.      Left Ear: Decreased hearing noted. Drainage present. A middle ear effusion is present. Tympanic membrane is not erythematous or bulging.   Cardiovascular:      Rate and Rhythm: Normal rate and regular rhythm.   Pulmonary:      Effort: Pulmonary effort is normal. No respiratory distress.      Breath sounds: Normal breath sounds. No stridor. No wheezing, rhonchi or rales.   Skin:     General: Skin is warm and dry.   Neurological:      Mental Status: He is alert and oriented to person, place, and time.   Psychiatric:         Mood and Affect: Mood normal.        Result Review :                   Assessment and Plan   Diagnoses and all orders for this visit:    1. Hearing loss of left ear, unspecified hearing " loss type (Primary)  Comments:  Hearing test performed, deficit on left. referral to ENT. Continue Flonase, OTC benadryl at night  Orders:  -     Comprehensive Hearing Test; Future  -     Ambulatory Referral to ENT (Otolaryngology)             Follow Up   Return if symptoms worsen or fail to improve.  Patient was given instructions and counseling regarding his condition or for health maintenance advice. Please see specific information pulled into the AVS if appropriate.

## 2023-01-20 ENCOUNTER — OFFICE VISIT (OUTPATIENT)
Dept: OTOLARYNGOLOGY | Facility: CLINIC | Age: 38
End: 2023-01-20
Payer: MEDICAID

## 2023-01-20 VITALS — TEMPERATURE: 97.9 F | WEIGHT: 150 LBS | HEIGHT: 65 IN | BODY MASS INDEX: 24.99 KG/M2

## 2023-01-20 DIAGNOSIS — H65.02 NON-RECURRENT ACUTE SEROUS OTITIS MEDIA OF LEFT EAR: ICD-10-CM

## 2023-01-20 DIAGNOSIS — H69.82 DYSFUNCTION OF LEFT EUSTACHIAN TUBE: Primary | ICD-10-CM

## 2023-01-20 DIAGNOSIS — H61.23 BILATERAL IMPACTED CERUMEN: ICD-10-CM

## 2023-01-20 PROCEDURE — 99214 OFFICE O/P EST MOD 30 MIN: CPT | Performed by: OTOLARYNGOLOGY

## 2023-01-20 RX ORDER — LEVOTHYROXINE SODIUM 0.05 MG/1
50 TABLET ORAL DAILY
COMMUNITY
End: 2023-03-29

## 2023-01-25 PROCEDURE — 69210 REMOVE IMPACTED EAR WAX UNI: CPT | Performed by: OTOLARYNGOLOGY

## 2023-01-25 NOTE — PROGRESS NOTES
Patient Name: Juan M Lynch   Visit Date: 01/20/2023   Patient ID: [id number]  Provider: Reji Beebe MD    Sex: male  [place] [facility]   YOB: 1985  Location Address: [address] [place] [address], [address] [place] [address],?[place]?[id number]    Primary Care Provider Zoila Mckinney APRN  Location Phone: [phone]    Referring Provider: KADEN Rosales        Chief Complaint  Hearing loss    Subjective    History of Present Illness  Juan M Lynch is a 37 y.o. male who presents to [facility] today as a consult from KADEN Rosales.    He presents to the clinic today for evaluation of eustachian tube dysfunction and ear infection with decreased hearing on the left.  He informs me this happened during an upper respiratory infection, and the fluid has been present there for about a month.  He has had no significant changes on the right side, and seems to be doing better on the right.  He did finish a course of antibiotics.  He is previously had no significant ear issues, and I am familiar with the patient as I previously treated his thyroid disease.    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   • Thoracic outlet syndrome 08/01/2022   • Thyroid cancer (HCC)    • Thyroid nodule        Past Surgical History:   Procedure Laterality Date   • ELBOW PROCEDURE     • FIRST RIB RESECTION Right 8/8/2022    Procedure: BRONCHOSCOPY, RIGHT THORACOSCOPY VIDEO ASSISTED WITH RESECTION OF RIGHT FIRST RIB, INTERCOSTAL NERVE BLOCK;  Surgeon: Manan Gasca III, MD;  Location: [facility];  Service: Thoracic;  Laterality: Right;   • NASAL ENDOSCOPY N/A 5/16/2022    Procedure: NASAL ENDOSCOPY;  Surgeon: Reji Beebe MD;  Location: [facility];  Service: ENT;  Laterality: N/A;   • THYROIDECTOMY Right 5/16/2022    Procedure: Right THYROIDECTOMY, excision of nasal  "polyps;  Surgeon: Reji Beebe MD;  [place] [facility] [place] [facility] [place]  Service: [place];  Laterality: Right;   • VASECTOMY           Current Outpatient Medications:   •  fluticasone (FLONASE) 50 MCG/ACT nasal spray, 2 sprays into the nostril(s) as directed by provider Daily., Disp: 15.8 mL, Rfl: 0  •  hydrocortisone 2.5 % cream, Apply 1 application topically to the appropriate area as directed 2 (Two) Times a Day., Disp: 28 g, Rfl: 0  •  levothyroxine (SYNTHROID, LEVOTHROID) 50 MCG tablet, Take 50 mcg by mouth Daily., Disp: , Rfl:   •  Synthroid 50 MCG tablet, TAKE ONE TABLET BY MOUTH DAILY, Disp: 30 tablet, Rfl: 3     Allergies   Allergen Reactions   • Robitussin Severe Cgh-Sr Thrt [Acetaminophen-Dm] Anaphylaxis       Family History   Problem Relation Age of Onset   • No Known Problems Mother    • No Known Problems Father    • Malig Hyperthermia Neg Hx         Social History     Social History Narrative   • Not on file       Objective     Vital Signs:   Temp 97.9 °F (36.6 °C) (Temporal)   Ht 165.1 cm (65\")   Wt 68 kg (150 lb)   BMI 24.96 kg/m²       Physical Exam    Ear Cerumen Removal    Date/Time: 1/25/2023 1:59 PM  Performed by: Reji Beebe MD  Authorized by: Reji Beebe MD     Anesthesia:  Local Anesthetic: none  Location details: left ear and right ear  Procedure type: instrumentation, curette   Sedation:  Patient sedated: no            Constitutional   Appearance  · : well developed, well-nourished, alert and in no acute distress, voice clear and strong    Head  Inspection  · : no deformities or lesions  Face  Inspection  · : No facial lesions; House-Brackmann I/VI bilaterally  Palpation  · : No TMJ crepitus nor  muscle tenderness bilaterally    Eyes  Vision  Visual Fields  · : Extraocular movements are intact. No spontaneous or gaze-induced nystagmus.  Conjunctivae  · : clear  Sclerae  · : clear  Pupils and Irises  · : pupils equal, round, and reactive to light.     Ears, Nose, Mouth " and Throat    Ears    External Ears  · : appearance within normal limits, no lesions present  Otoscopic Examination  · : Cerumen impactions, cleared with microscope and curettes, tympanic membrane appearance within normal limits on the right, otitis media with effusion on the left  Hearing  · : intact to conversational voice both ears  Tunning fork testing:     :    Nose    External Nose  · : appearance normal  Intranasal Exam  · : mucosa within normal limits, vestibules normal, no intranasal lesions present, septum midline, sinuses non tender to percussion  Oral Cavity    Oral Mucosa  · : oral mucosa normal without pallor or cyanosis  Lips  · : lip appearance normal  Teeth  · : normal dentition for age  Gums  · : gums pink, non-swollen, no bleeding present  Tongue  · : tongue appearance normal; normal mobility  Palate  · : hard palate normal, soft palate appearance normal with symmetric mobility    Throat    Oropharynx  · : no inflammation or lesions present, tonsils within normal limits  Hypopharynx  · : appearance within normal limits, superior epiglottis within normal limits  Larynx  · : appearance within normal limits, vocal cords within normal limits, no lesions present    Neck  Inspection/Palpation  · : normal appearance, no masses or tenderness, trachea midline; thyroid size normal, nontender, no nodules or masses present on palpation    Respiratory  Respiratory Effort  · : breathing unlabored  Inspection of Chest  · : normal appearance, no retractions    Cardiovascular  Heart  · : regular rate and rhythm    Lymphatic  Neck  · : no lymphadenopathy present  Supraclavicular Nodes  · : no lymphadenopathy present  Preauricular Nodes  · : no lymphadenopathy present    Skin and Subcutaneous Tissue  General Inspection  · : Regarding face and neck - there are no rashes present, no lesions present, and no areas of discoloration    Neurologic  Cranial Nerves  · : cranial nerves II-XII are grossly intact  bilaterally  Gait and Station  · : normal gait, able to stand without diffculty    Psychiatric  Judgement and Insight  · : judgment and insight intact  Mood and Affect  · : mood normal, affect appropriate          Assessment and Plan    Diagnoses and all orders for this visit:    1. Dysfunction of left eustachian tube (Primary)    2. Non-recurrent acute serous otitis media of left ear    3. Bilateral impacted cerumen    Other orders  -     Ear Cerumen Removal    Examination today revealed cerumen impactions which I was able to clear.  Left ear shows otitis media with effusion and some air bubbles indicative of possible resolution.  I reviewed his audiogram results which show conductive hearing loss worse on the left side.  I discussed eustachian tube dysfunction with him, and he has had no other significant nasal symptoms other than the initial congestion that started the process.  We discussed nasal saline irrigations and popping the ear to try to clear the fluid.  I will plan to see him back in the clinic to reassess the ear, especially if the fluid does not resolve spontaneously, we did discuss myringotomy and PE tube placement should he have continued issues.    Follow Up   No follow-ups on file.  Patient was given instructions and counseling regarding his condition or for health maintenance advice. Please see specific information pulled into the AVS if appropriate.

## 2023-01-26 DIAGNOSIS — R09.82 PND (POST-NASAL DRIP): ICD-10-CM

## 2023-01-26 DIAGNOSIS — H66.90 ACUTE OTITIS MEDIA, UNSPECIFIED OTITIS MEDIA TYPE: ICD-10-CM

## 2023-01-26 DIAGNOSIS — J06.9 ACUTE URI: ICD-10-CM

## 2023-01-26 DIAGNOSIS — R05.1 ACUTE COUGH: ICD-10-CM

## 2023-01-27 RX ORDER — FLUTICASONE PROPIONATE 50 MCG
SPRAY, SUSPENSION (ML) NASAL
Qty: 16 ML | Refills: 0 | Status: SHIPPED | OUTPATIENT
Start: 2023-01-27 | End: 2023-04-05 | Stop reason: SDUPTHER

## 2023-01-27 RX ORDER — AMOXICILLIN AND CLAVULANATE POTASSIUM 875; 125 MG/1; MG/1
TABLET, FILM COATED ORAL
Qty: 14 TABLET | Refills: 0 | OUTPATIENT
Start: 2023-01-27

## 2023-01-27 RX ORDER — BENZONATATE 100 MG/1
CAPSULE ORAL
Qty: 15 CAPSULE | Refills: 0 | OUTPATIENT
Start: 2023-01-27

## 2023-01-30 DIAGNOSIS — L30.9 DERMATITIS: ICD-10-CM

## 2023-01-30 NOTE — TELEPHONE ENCOUNTER
Rx Refill Note  Requested Prescriptions     Pending Prescriptions Disp Refills   • hydrocortisone 2.5 % cream 28 g 0     Sig: Apply 1 application topically to the appropriate area as directed 2 (Two) Times a Day.      Last office visit with prescribing clinician: 8/12/2022     Next office visit with prescribing clinician: Visit date not found       Yoana Sullivan LPN  01/30/23, 13:35 EST

## 2023-03-29 RX ORDER — LEVOTHYROXINE SODIUM 0.05 MG/1
TABLET ORAL
Qty: 30 TABLET | Refills: 0 | Status: SHIPPED | OUTPATIENT
Start: 2023-03-29

## 2023-04-05 DIAGNOSIS — R09.82 PND (POST-NASAL DRIP): ICD-10-CM

## 2023-04-05 RX ORDER — FLUTICASONE PROPIONATE 50 MCG
2 SPRAY, SUSPENSION (ML) NASAL DAILY
Qty: 16 ML | Refills: 0 | Status: SHIPPED | OUTPATIENT
Start: 2023-04-05

## 2023-04-18 ENCOUNTER — PROCEDURE VISIT (OUTPATIENT)
Dept: OTOLARYNGOLOGY | Facility: CLINIC | Age: 38
End: 2023-04-18
Payer: MEDICAID

## 2023-04-18 ENCOUNTER — OFFICE VISIT (OUTPATIENT)
Dept: OTOLARYNGOLOGY | Facility: CLINIC | Age: 38
End: 2023-04-18
Payer: MEDICAID

## 2023-04-18 VITALS
DIASTOLIC BLOOD PRESSURE: 70 MMHG | HEART RATE: 68 BPM | BODY MASS INDEX: 25.92 KG/M2 | SYSTOLIC BLOOD PRESSURE: 125 MMHG | HEIGHT: 65 IN | WEIGHT: 155.6 LBS | TEMPERATURE: 98.4 F

## 2023-04-18 DIAGNOSIS — H90.A32 MIXED CONDUCTIVE AND SENSORINEURAL HEARING LOSS OF LEFT EAR WITH RESTRICTED HEARING OF RIGHT EAR: Primary | ICD-10-CM

## 2023-04-18 DIAGNOSIS — J30.1 SEASONAL ALLERGIC RHINITIS DUE TO POLLEN: ICD-10-CM

## 2023-04-18 DIAGNOSIS — H90.A21 SENSORINEURAL HEARING LOSS (SNHL) OF RIGHT EAR WITH RESTRICTED HEARING OF LEFT EAR: ICD-10-CM

## 2023-04-18 DIAGNOSIS — H69.83 DYSFUNCTION OF BOTH EUSTACHIAN TUBES: ICD-10-CM

## 2023-04-18 DIAGNOSIS — H90.5 SENSORINEURAL HEARING LOSS (SNHL) OF LEFT EAR, UNSPECIFIED HEARING STATUS ON CONTRALATERAL SIDE: Primary | ICD-10-CM

## 2023-04-18 DIAGNOSIS — H93.13 TINNITUS, BILATERAL: ICD-10-CM

## 2023-04-18 PROCEDURE — 92557 COMPREHENSIVE HEARING TEST: CPT | Performed by: AUDIOLOGIST

## 2023-04-18 NOTE — PROGRESS NOTES
Patient Name: Juan M Lynch   Visit Date: 04/18/2023   Patient ID: [id number]  Provider: eRji Beebe MD    Sex: male  [place] [facility]   YOB: 1985  Location Address: [address] [place] [address], [address] [place] [address],?[place]?[id number]    Primary Care Provider Zoila Mckinney APRN  Location Phone: [phone]    Referring Provider: No ref. provider found        Chief Complaint  Follow-up (Ear check)    Subjective    History of Present Illness  Juan M Lynch is a 37 y.o. male who presents to [facility] today as a consult from No ref. provider found.    He presents to the clinic today for evaluation of his ears and hearing.  He does have some issues with allergic rhinitis and eustachian tube dysfunction, and at the last clinic visit in January had fluid on the left side with air bubbles in the middle ear that appeared to be resolving otitis media.  I had him do some Valsalva maneuvers, he notes that the ear is better, but he still feels that he is not hearing perfectly.  He informs me that he was out turkey hunting and his friends could hear the turkeys and he was not able to do so.  He has not had a hearing test in quite some time.    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   • Thoracic outlet syndrome 08/01/2022   • Thyroid cancer    • Thyroid nodule        Past Surgical History:   Procedure Laterality Date   • ELBOW PROCEDURE     • FIRST RIB RESECTION Right 8/8/2022    Procedure: BRONCHOSCOPY, RIGHT THORACOSCOPY VIDEO ASSISTED WITH RESECTION OF RIGHT FIRST RIB, INTERCOSTAL NERVE BLOCK;  Surgeon: Manan Gasca III, MD;  Location: [facility];  Service: Thoracic;  Laterality: Right;   • NASAL ENDOSCOPY N/A 5/16/2022    Procedure: NASAL ENDOSCOPY;  Surgeon: Reji Beebe MD;  Location: [facility];  Service: ENT;   "Laterality: N/A;   • THYROIDECTOMY Right 5/16/2022    Procedure: Right THYROIDECTOMY, excision of nasal polyps;  Surgeon: Reji Beebe MD;  [place] [facility] [place] [facility] [place]  Service: E[place];  Laterality: Right;   • VASECTOMY           Current Outpatient Medications:   •  fluticasone (FLONASE) 50 MCG/ACT nasal spray, 2 sprays by Each Nare route Daily., Disp: 16 mL, Rfl: 0  •  hydrocortisone 2.5 % cream, Apply 1 application topically to the appropriate area as directed 2 (Two) Times a Day., Disp: 28 g, Rfl: 0  •  levothyroxine (SYNTHROID, LEVOTHROID) 50 MCG tablet, TAKE ONE TABLET BY MOUTH DAILY, Disp: 30 tablet, Rfl: 0  •  Synthroid 50 MCG tablet, TAKE ONE TABLET BY MOUTH DAILY (Patient not taking: Reported on 4/18/2023), Disp: 30 tablet, Rfl: 3     Allergies   Allergen Reactions   • Robitussin Severe Cgh-Sr Thrt [Acetaminophen-Dm] Anaphylaxis       Family History   Problem Relation Age of Onset   • No Known Problems Mother    • No Known Problems Father    • Malig Hyperthermia Neg Hx         Social History     Social History Narrative   • Not on file       Objective     Vital Signs:   /70 (BP Location: Left arm, Patient Position: Sitting, Cuff Size: Adult)   Pulse 68   Temp 98.4 °F (36.9 °C) (Tympanic)   Ht 165.1 cm (65\")   Wt 70.6 kg (155 lb 9.6 oz)   BMI 25.89 kg/m²       Physical Exam    Ear Microscopy    Date/Time: 4/18/2023 4:02 PM  Performed by: Reji Beebe MD  Authorized by: Reji Beebe MD     Ear examination was performed utilizing binocular microscopy.    Comments:      Exam of the ears revealed improvement in the left middle ear with no residual fluid.        Constitutional   Appearance  · : well developed, well-nourished, alert and in no acute distress, voice clear and strong    Head  Inspection  · : no deformities or lesions  Face  Inspection  · : No facial lesions; House-Brackmann I/VI bilaterally  Palpation  · : No TMJ crepitus nor  muscle tenderness " bilaterally    Eyes  Vision  Visual Fields  · : Extraocular movements are intact. No spontaneous or gaze-induced nystagmus.  Conjunctivae  · : clear  Sclerae  · : clear  Pupils and Irises  · : pupils equal, round, and reactive to light.     Ears, Nose, Mouth and Throat    Ears    External Ears  · : appearance within normal limits, no lesions present  Otoscopic Examination  · : Tympanic membrane appearance within normal limits bilaterally without perforations, well-aerated middle ears  Hearing  · : intact to conversational voice both ears  Tunning fork testing:     :    Nose    External Nose  · : appearance normal  Intranasal Exam  · : mucosa within normal limits, vestibules normal, no intranasal lesions present, septum midline, sinuses non tender to percussion  Oral Cavity    Oral Mucosa  · : oral mucosa normal without pallor or cyanosis  Lips  · : lip appearance normal  Teeth  · : normal dentition for age  Gums  · : gums pink, non-swollen, no bleeding present  Tongue  · : tongue appearance normal; normal mobility  Palate  · : hard palate normal, soft palate appearance normal with symmetric mobility    Throat    Oropharynx  · : no inflammation or lesions present, tonsils within normal limits  Hypopharynx  · : appearance within normal limits, superior epiglottis within normal limits  Larynx  · : appearance within normal limits, vocal cords within normal limits, no lesions present    Neck  Inspection/Palpation  · : normal appearance, no masses or tenderness, trachea midline; thyroid size normal, nontender, no nodules or masses present on palpation    Respiratory  Respiratory Effort  · : breathing unlabored  Inspection of Chest  · : normal appearance, no retractions    Cardiovascular  Heart  · : regular rate and rhythm    Lymphatic  Neck  · : no lymphadenopathy present  Supraclavicular Nodes  · : no lymphadenopathy present  Preauricular Nodes  · : no lymphadenopathy present    Skin and Subcutaneous Tissue  General  Inspection  · : Regarding face and neck - there are no rashes present, no lesions present, and no areas of discoloration    Neurologic  Cranial Nerves  · : cranial nerves II-XII are grossly intact bilaterally  Gait and Station  · : normal gait, able to stand without diffculty    Psychiatric  Judgement and Insight  · : judgment and insight intact  Mood and Affect  · : mood normal, affect appropriate          Assessment and Plan    Diagnoses and all orders for this visit:    1. Sensorineural hearing loss (SNHL) of left ear, unspecified hearing status on contralateral side (Primary)  -     Comprehensive Hearing Test; Future  -     Tympanometry; Future    2. Dysfunction of both eustachian tubes  -     Comprehensive Hearing Test; Future  -     Tympanometry; Future    3. Seasonal allergic rhinitis due to pollen    Examination today revealed normal-appearing ears, and I used a microscope to assess his middle ear status which appears to be fluid free and well aerated.  Audiogram was ordered and performed today in the clinic and this shows normal hearing in the right ear sloping down to mild hearing loss for high frequencies.  In the left ear there is a mild conductive hearing loss with high-frequency sensorineural loss that is moderate.  This was consistent with his previous audiograms, and I discussed the hearing loss with him.  He does not feel that he needs hearing aids at this point.  I did recommend repeating the hearing test in 1 year, or sooner should there be any issues.    Follow Up   No follow-ups on file.  Patient was given instructions and counseling regarding his condition or for health maintenance advice. Please see specific information pulled into the AVS if appropriate.
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
## 2023-04-25 RX ORDER — LEVOTHYROXINE SODIUM 0.05 MG/1
TABLET ORAL
Qty: 30 TABLET | Refills: 0 | Status: SHIPPED | OUTPATIENT
Start: 2023-04-25

## 2023-05-30 RX ORDER — LEVOTHYROXINE SODIUM 0.05 MG/1
TABLET ORAL
Qty: 30 TABLET | Refills: 0 | Status: SHIPPED | OUTPATIENT
Start: 2023-05-30

## 2023-06-15 ENCOUNTER — TELEPHONE (OUTPATIENT)
Dept: OTOLARYNGOLOGY | Facility: CLINIC | Age: 38
End: 2023-06-15
Payer: COMMERCIAL

## 2023-06-15 NOTE — TELEPHONE ENCOUNTER
Attempted to call patient, voicemail was full and was unable to leave a message.  Patient call was a follow up call concerning a billing dispute.

## 2023-07-28 ENCOUNTER — OFFICE VISIT (OUTPATIENT)
Dept: FAMILY MEDICINE CLINIC | Age: 38
End: 2023-07-28
Payer: COMMERCIAL

## 2023-07-28 VITALS
BODY MASS INDEX: 25.79 KG/M2 | SYSTOLIC BLOOD PRESSURE: 129 MMHG | DIASTOLIC BLOOD PRESSURE: 80 MMHG | OXYGEN SATURATION: 99 % | HEART RATE: 60 BPM | TEMPERATURE: 98.4 F | WEIGHT: 154.8 LBS | HEIGHT: 65 IN

## 2023-07-28 DIAGNOSIS — Z00.00 ANNUAL PHYSICAL EXAM: Primary | ICD-10-CM

## 2023-07-28 DIAGNOSIS — L30.9 DERMATITIS: ICD-10-CM

## 2023-07-28 DIAGNOSIS — E03.9 ACQUIRED HYPOTHYROIDISM: ICD-10-CM

## 2023-07-28 PROCEDURE — 99396 PREV VISIT EST AGE 40-64: CPT | Performed by: NURSE PRACTITIONER

## 2023-07-28 RX ORDER — LEVOTHYROXINE SODIUM 0.05 MG/1
50 TABLET ORAL DAILY
Qty: 90 TABLET | Refills: 1 | Status: SHIPPED | OUTPATIENT
Start: 2023-07-28

## 2023-07-28 RX ORDER — LEVOTHYROXINE SODIUM 0.05 MG/1
50 TABLET ORAL DAILY
Qty: 90 TABLET | Refills: 0 | Status: SHIPPED | OUTPATIENT
Start: 2023-07-28 | End: 2023-07-28 | Stop reason: SDUPTHER

## 2023-07-28 NOTE — PROGRESS NOTES
Chief Complaint  Juan M Lynch presents to [facility] for Annual Exam    Subjective          History of Present Illness    Mendoza is here today for a preventive exam.  Declines covid, Tdap vaccines.   Never smoker.  He reports that he engages in regular physical activity. He has physical job, swims, and lifts weights.   He has regular dental exams and regular eye exams.   He is s/p thyroidectomy for thyroid CA. He is on levothyroxine 50 mcg daily. Thyroid labs normal 9/2022. He reports some fatigue. He was previously on name brand Synthroid and did not feel fatigued. Wondering if he can go back to name brand medication. This was changed due to insurance coverage. ENT has recommended that he start having filled with primary care.   Uses hydrocortisone cream for facial dermatitis with improvement. Requesting refill.     Review of Systems   Constitutional:  Positive for fatigue. Negative for chills and fever.   HENT:  Negative for ear pain and sore throat.    Eyes:  Negative for blurred vision and redness.   Respiratory:  Negative for shortness of breath and wheezing.    Cardiovascular:  Negative for chest pain and palpitations.   Gastrointestinal:  Negative for abdominal pain, nausea and vomiting.   Genitourinary:  Negative for dysuria, frequency and urgency.   Musculoskeletal:  Negative for joint swelling.   Skin:  Positive for rash.   Neurological:  Negative for seizures and syncope.   Psychiatric/Behavioral:  Negative for suicidal ideas and depressed mood.        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 (Patient taking differently: 2 sprays by Each Nare route As Needed.) 16 mL 0    hydrocortisone 2.5 % cream Apply 1 application  topically to the appropriate area as directed 2 (Two) Times a Day. 28 g 0    levothyroxine (SYNTHROID, LEVOTHROID) 50 MCG tablet Take 1 tablet by mouth Daily. 90 tablet 1     No current facility-administered medications for this visit.     Past Surgical History:   Procedure Laterality Date    ELBOW PROCEDURE      FIRST RIB RESECTION Right 8/8/2022    Procedure: BRONCHOSCOPY, RIGHT THORACOSCOPY VIDEO ASSISTED WITH RESECTION OF RIGHT FIRST RIB, INTERCOSTAL NERVE BLOCK;  Surgeon: Manan Gasca III, MD;  Location: [facility] MA[facility] OR;  Service: Thoracic;  Laterality: Right;    NASAL ENDOSCOPY N/A 5/16/2022    Procedure: NASAL ENDOSCOPY;  Surgeon: Reji Beebe MD;  Location: [facility] OR [facility];  Service: ENT;  Laterality: N/A;    THYROIDECTOMY Right 5/16/2022    Procedure: Right THYROIDECTOMY, excision of nasal polyps;  Surgeon: Reji Beebe MD;  Location: [facility] OR [facility];  Service: ENT;  Laterality: Right;    VASECTOMY        Social History     Tobacco Use    Smoking status: Never     Passive exposure: Past    Smokeless tobacco: Never   Vaping Use    Vaping Use: Never used   Substance Use Topics    Alcohol use: Yes     Comment: DAILY- BEER/LIQUOR    Drug use: Yes     Types: Marijuana     Comment: \"SOME\"     Family History   Problem Relation Age of Onset    No Known Problems Mother     No Known Problems Father     Malig Hyperthermia Neg Hx      There are no preventive care reminders to display for this patient.     Immunization History   Administered Date(s) Administered    Td (TDVAX) 09/24/2001        Objective     Vitals:    07/28/23 1526   BP: 129/80   BP Location: Left arm   Patient Position: Sitting   Cuff Size: Large Adult   Pulse: 60   Temp: 98.4 °F (36.9 °C)   TempSrc: Oral   SpO2: 99%   Weight: 70.2 kg (154 lb 12.8 oz) " "  Height: 165.1 cm (65\")     Waist circumference 35 inches    Body mass index is 25.76 kg/m².     BMI is >= 25 and <30. (Overweight) The following options were offered after discussion;: exercise counseling/recommendations and nutrition counseling/recommendations       Physical Exam  Vitals reviewed.   Constitutional:       General: He is not in acute distress.     Appearance: Normal appearance.   HENT:      Head: Normocephalic and atraumatic.      Right Ear: Hearing, tympanic membrane and ear canal normal.      Left Ear: Hearing, tympanic membrane and ear canal normal.      Mouth/Throat:      Mouth: Mucous membranes are moist.   Eyes:      Extraocular Movements: Extraocular movements intact.      Pupils: Pupils are equal, round, and reactive to light.   Cardiovascular:      Rate and Rhythm: Normal rate and regular rhythm.   Pulmonary:      Effort: Pulmonary effort is normal. No respiratory distress.      Breath sounds: Normal breath sounds.   Abdominal:      General: Bowel sounds are normal.      Palpations: Abdomen is soft.      Tenderness: There is no abdominal tenderness.   Musculoskeletal:         General: Normal range of motion.      Cervical back: Normal range of motion and neck supple.   Skin:     General: Skin is warm and dry.      Findings: Rash (forehead dermatitis) present.   Neurological:      Mental Status: He is alert and oriented to person, place, and time.   Psychiatric:         Mood and Affect: Mood normal.         Result Review :                               Assessment and Plan      Diagnoses and all orders for this visit:    1. Annual physical exam (Primary)  Comments:  Appropriate screenings and vaccinations were reviewed with the pt and offered as indicated.  Pt counseled on healthy lifestyle including healthy diet, exercise.  Orders:  -     CBC No Differential; Future  -     Comprehensive metabolic panel; Future  -     Lipid panel; Future    2. Dermatitis  Comments:  Reports improvement with " current rx and desires to continue.  Orders:  -     hydrocortisone 2.5 % cream; Apply 1 application  topically to the appropriate area as directed 2 (Two) Times a Day.  Dispense: 28 g; Refill: 0    3. Acquired hypothyroidism  Comments:  Will return for labs. May need adjustment in med dosage. Consider name brand synthroid.  Orders:  -     TSH Rfx On Abnormal To Free T4; Future  -     levothyroxine (SYNTHROID, LEVOTHROID) 50 MCG tablet; Take 1 tablet by mouth Daily.  Dispense: 90 tablet; Refill: 1    Other orders  -     Discontinue: levothyroxine (SYNTHROID, LEVOTHROID) 50 MCG tablet; Take 1 tablet by mouth Daily.  Dispense: 90 tablet; Refill: 0                Follow Up     Return in about 6 months (around 1/28/2024) for Recheck.

## 2023-08-01 ENCOUNTER — LAB (OUTPATIENT)
Dept: LAB | Facility: HOSPITAL | Age: 38
End: 2023-08-01
Payer: COMMERCIAL

## 2023-08-01 DIAGNOSIS — Z00.00 ANNUAL PHYSICAL EXAM: ICD-10-CM

## 2023-08-01 DIAGNOSIS — E03.9 ACQUIRED HYPOTHYROIDISM: ICD-10-CM

## 2023-08-01 LAB
ALBUMIN SERPL-MCNC: 4.3 G/DL (ref 3.5–5.2)
ALBUMIN/GLOB SERPL: 1.5 G/DL
ALP SERPL-CCNC: 70 U/L (ref 39–117)
ALT SERPL W P-5'-P-CCNC: 39 U/L (ref 1–41)
ANION GAP SERPL CALCULATED.3IONS-SCNC: 8.8 MMOL/L (ref 5–15)
AST SERPL-CCNC: 31 U/L (ref 1–40)
BILIRUB SERPL-MCNC: 0.3 MG/DL (ref 0–1.2)
BUN SERPL-MCNC: 12 MG/DL (ref 6–20)
BUN/CREAT SERPL: 10.8 (ref 7–25)
CALCIUM SPEC-SCNC: 9.4 MG/DL (ref 8.6–10.5)
CHLORIDE SERPL-SCNC: 104 MMOL/L (ref 98–107)
CHOLEST SERPL-MCNC: 237 MG/DL (ref 0–200)
CO2 SERPL-SCNC: 24.2 MMOL/L (ref 22–29)
CREAT SERPL-MCNC: 1.11 MG/DL (ref 0.76–1.27)
DEPRECATED RDW RBC AUTO: 45.5 FL (ref 37–54)
EGFRCR SERPLBLD CKD-EPI 2021: 86.1 ML/MIN/1.73
ERYTHROCYTE [DISTWIDTH] IN BLOOD BY AUTOMATED COUNT: 13.4 % (ref 12.3–15.4)
GLOBULIN UR ELPH-MCNC: 2.9 GM/DL
GLUCOSE SERPL-MCNC: 99 MG/DL (ref 65–99)
HCT VFR BLD AUTO: 42.8 % (ref 37.5–51)
HDLC SERPL-MCNC: 52 MG/DL (ref 40–60)
HGB BLD-MCNC: 14.5 G/DL (ref 13–17.7)
LDLC SERPL CALC-MCNC: 163 MG/DL (ref 0–100)
LDLC/HDLC SERPL: 3.09 {RATIO}
MCH RBC QN AUTO: 30.7 PG (ref 26.6–33)
MCHC RBC AUTO-ENTMCNC: 33.9 G/DL (ref 31.5–35.7)
MCV RBC AUTO: 90.7 FL (ref 79–97)
PLATELET # BLD AUTO: 228 10*3/MM3 (ref 140–450)
PMV BLD AUTO: 9.8 FL (ref 6–12)
POTASSIUM SERPL-SCNC: 4.2 MMOL/L (ref 3.5–5.2)
PROT SERPL-MCNC: 7.2 G/DL (ref 6–8.5)
RBC # BLD AUTO: 4.72 10*6/MM3 (ref 4.14–5.8)
SODIUM SERPL-SCNC: 137 MMOL/L (ref 136–145)
TRIGL SERPL-MCNC: 121 MG/DL (ref 0–150)
TSH SERPL DL<=0.05 MIU/L-ACNC: 1.84 UIU/ML (ref 0.27–4.2)
VLDLC SERPL-MCNC: 22 MG/DL (ref 5–40)
WBC NRBC COR # BLD: 7.17 10*3/MM3 (ref 3.4–10.8)

## 2023-08-01 PROCEDURE — 36415 COLL VENOUS BLD VENIPUNCTURE: CPT

## 2023-08-01 PROCEDURE — 84443 ASSAY THYROID STIM HORMONE: CPT

## 2023-08-01 PROCEDURE — 80053 COMPREHEN METABOLIC PANEL: CPT

## 2023-08-01 PROCEDURE — 80061 LIPID PANEL: CPT

## 2023-08-01 PROCEDURE — 85027 COMPLETE CBC AUTOMATED: CPT

## 2023-10-31 ENCOUNTER — TELEPHONE (OUTPATIENT)
Dept: FAMILY MEDICINE CLINIC | Age: 38
End: 2023-10-31

## 2023-10-31 NOTE — TELEPHONE ENCOUNTER
"  Caller: Juan M Lynch \"Mendoza\"    Relationship: Self    Best call back number: [phone]    What is the best time to reach you: ANYTIME    Who are you requesting to speak with (clinical staff, provider,  specific staff member): NURSE       What was the call regarding: PATIENT IS CALLING STATING THAT THE NEW MEDICATION THAT HE WAS RECENTLY PUT ON IS NOT DOING ANYTHING AND HE WOULD LIKE TO BE PUT BACK ON THE SYNTHROID.     "

## 2023-11-01 ENCOUNTER — PRIOR AUTHORIZATION (OUTPATIENT)
Dept: FAMILY MEDICINE CLINIC | Age: 38
End: 2023-11-01
Payer: COMMERCIAL

## 2023-11-01 DIAGNOSIS — E03.9 ACQUIRED HYPOTHYROIDISM: Primary | ICD-10-CM

## 2023-11-01 RX ORDER — LEVOTHYROXINE SODIUM 50 MCG
50 TABLET ORAL DAILY
Qty: 90 TABLET | Refills: 0 | Status: SHIPPED | OUTPATIENT
Start: 2023-11-01

## 2023-11-09 ENCOUNTER — OFFICE VISIT (OUTPATIENT)
Dept: FAMILY MEDICINE CLINIC | Age: 38
End: 2023-11-09
Payer: COMMERCIAL

## 2023-11-09 VITALS
DIASTOLIC BLOOD PRESSURE: 87 MMHG | TEMPERATURE: 98.9 F | HEIGHT: 65 IN | WEIGHT: 155.2 LBS | SYSTOLIC BLOOD PRESSURE: 139 MMHG | OXYGEN SATURATION: 97 % | BODY MASS INDEX: 25.86 KG/M2 | HEART RATE: 72 BPM

## 2023-11-09 DIAGNOSIS — R09.81 NASAL CONGESTION: ICD-10-CM

## 2023-11-09 DIAGNOSIS — J01.00 ACUTE NON-RECURRENT MAXILLARY SINUSITIS: Primary | ICD-10-CM

## 2023-11-09 DIAGNOSIS — R05.1 ACUTE COUGH: ICD-10-CM

## 2023-11-09 PROCEDURE — 99213 OFFICE O/P EST LOW 20 MIN: CPT | Performed by: NURSE PRACTITIONER

## 2023-11-09 RX ORDER — DEXTROMETHORPHAN HYDROBROMIDE AND PROMETHAZINE HYDROCHLORIDE 15; 6.25 MG/5ML; MG/5ML
5 SYRUP ORAL NIGHTLY PRN
Qty: 118 ML | Refills: 0 | Status: SHIPPED | OUTPATIENT
Start: 2023-11-09

## 2023-11-09 RX ORDER — AZITHROMYCIN 250 MG/1
TABLET, FILM COATED ORAL
Qty: 6 TABLET | Refills: 0 | Status: SHIPPED | OUTPATIENT
Start: 2023-11-09

## 2023-11-09 NOTE — LETTER
November 9, 2023     Patient: Juan M Lynch   YOB: 1985   Date of Visit: 11/9/2023       To Whom It May Concern:    It is my medical opinion that Juan M Lynch may return to work on 11/13/2023 .           Sincerely,        KADEN Rosales    CC: No Recipients   Detail Level: Simple Additional Notes: Pt will finish 1 more month of acctuane, then DC. Render Risk Assessment In Note?: no

## 2023-11-09 NOTE — PROGRESS NOTES
"Chief Complaint  Cough (Sx started last week ), Nasal Congestion, and URI  Refused testing.    Subjective        Juan M Lynch presents to [facility]  Cough  This is a new problem. The current episode started 1 to 4 weeks ago. The problem has been gradually improving. The cough is Productive of sputum. Associated symptoms include myalgias and wheezing. Pertinent negatives include no chills, fever or shortness of breath. Treatments tried: Tylenol cold and flu.       Objective   Vital Signs:  /87 (BP Location: Right arm, Patient Position: Sitting, Cuff Size: Adult)   Pulse 72   Temp 98.9 °F (37.2 °C) (Temporal)   Ht 165.1 cm (65\")   Wt 70.4 kg (155 lb 3.2 oz)   SpO2 97% Comment: room air  BMI 25.83 kg/m²   Estimated body mass index is 25.83 kg/m² as calculated from the following:    Height as of this encounter: 165.1 cm (65\").    Weight as of this encounter: 70.4 kg (155 lb 3.2 oz).               Physical Exam  HENT:      Head: Normocephalic.      Right Ear: A middle ear effusion is present.      Left Ear: A middle ear effusion is present.      Nose: Congestion present.      Right Sinus: Maxillary sinus tenderness present.      Left Sinus: Maxillary sinus tenderness present.      Mouth/Throat:      Pharynx: Posterior oropharyngeal erythema present.   Cardiovascular:      Rate and Rhythm: Normal rate and regular rhythm.   Pulmonary:      Effort: Pulmonary effort is normal. No respiratory distress.      Breath sounds: Normal breath sounds. No stridor. No wheezing, rhonchi or rales.   Skin:     General: Skin is warm and dry.   Neurological:      Mental Status: He is alert and oriented to person, place, and time.   Psychiatric:         Mood and Affect: Mood normal.        Result Review :                   Assessment and Plan   Diagnoses and all orders for this visit:    1. Acute non-recurrent maxillary sinusitis (Primary)  -     azithromycin (Zithromax Z-Misael) 250 MG tablet; " Take 2 tablets by mouth on day 1, then 1 tablet daily on days 2-5  Dispense: 6 tablet; Refill: 0    2. Nasal congestion    3. Acute cough  -     promethazine-dextromethorphan (PROMETHAZINE-DM) 6.25-15 MG/5ML syrup; Take 5 mL by mouth At Night As Needed for Cough.  Dispense: 118 mL; Refill: 0             Follow Up   Return if symptoms worsen or fail to improve.  Patient was given instructions and counseling regarding his condition or for health maintenance advice. Please see specific information pulled into the AVS if appropriate.

## 2023-12-07 ENCOUNTER — TELEPHONE (OUTPATIENT)
Dept: FAMILY MEDICINE CLINIC | Age: 38
End: 2023-12-07
Payer: COMMERCIAL

## 2023-12-07 NOTE — TELEPHONE ENCOUNTER
Pt called stating that he needs AC to do a letter for a 2nd level appeal for [facility] to pay for his name brand Synthroid.  He states that when he was on the name brand, he felt better and didn't have the fatigue he has now.  He had a thyroidectomy and has to be on this medication for the rest of his life.  He states that info has to be included in the letter.  It needs to be mailed to [facility] [employer]Appeals  [address] [address]  [facility] [place] [address].    He is asking if this can be done ASAP.  I inf him that Sharona is out this week.  He is asking if you will send him notification on his mychart when the letter is mailed so if they say they didn't get it in a timely manner he can tell them when it was mailed.

## 2023-12-07 NOTE — TELEPHONE ENCOUNTER
"  Caller: Juan M Lynch \"Mendoza\"    Relationship: Self    Best call back number: [phone]     Who are you requesting to speak with (clinical staff, provider,  specific staff member): ABIODUN    What was the call regarding: PATIENT WANTED TO LET ABIODUN KNOW THAT THE OFFICE NEEDS TO CALL MEDICARE AT [phone] TO WORK ON THE APPEAL FOR HIS SYNTHROID MEDICATION. HE WOULD LIKE A CALL WHEN IT HAS BEEN COMPLETE.    "

## 2023-12-08 NOTE — TELEPHONE ENCOUNTER
[facility] first appeal was denied by [facility], so I need to do a secondary appeal and I told him I would call Medicare as well for an appeal through them.

## 2024-01-16 ENCOUNTER — OFFICE VISIT (OUTPATIENT)
Dept: FAMILY MEDICINE CLINIC | Age: 39
End: 2024-01-16
Payer: COMMERCIAL

## 2024-01-16 VITALS
SYSTOLIC BLOOD PRESSURE: 130 MMHG | HEIGHT: 65 IN | OXYGEN SATURATION: 97 % | DIASTOLIC BLOOD PRESSURE: 82 MMHG | WEIGHT: 153 LBS | HEART RATE: 73 BPM | BODY MASS INDEX: 25.49 KG/M2 | TEMPERATURE: 98.3 F

## 2024-01-16 DIAGNOSIS — F41.9 ANXIETY: Primary | ICD-10-CM

## 2024-01-16 DIAGNOSIS — R11.0 NAUSEA: ICD-10-CM

## 2024-01-16 DIAGNOSIS — J01.11 ACUTE RECURRENT FRONTAL SINUSITIS: ICD-10-CM

## 2024-01-16 DIAGNOSIS — R19.7 DIARRHEA, UNSPECIFIED TYPE: ICD-10-CM

## 2024-01-16 PROCEDURE — 99214 OFFICE O/P EST MOD 30 MIN: CPT | Performed by: NURSE PRACTITIONER

## 2024-01-16 RX ORDER — PROMETHAZINE HYDROCHLORIDE 25 MG/1
25 TABLET ORAL EVERY 6 HOURS PRN
Qty: 20 TABLET | Refills: 0 | Status: SHIPPED | OUTPATIENT
Start: 2024-01-16

## 2024-01-16 RX ORDER — BUSPIRONE HYDROCHLORIDE 5 MG/1
5 TABLET ORAL 2 TIMES DAILY
Qty: 60 TABLET | Refills: 0 | Status: SHIPPED | OUTPATIENT
Start: 2024-01-16

## 2024-01-16 RX ORDER — DOXYCYCLINE 100 MG/1
100 CAPSULE ORAL 2 TIMES DAILY
Qty: 14 CAPSULE | Refills: 0 | Status: SHIPPED | OUTPATIENT
Start: 2024-01-16

## 2024-01-16 RX ORDER — GUAIFENESIN, PSEUDOEPHEDRINE HYDROCHLORIDE 600; 60 MG/1; MG/1
1 TABLET, EXTENDED RELEASE ORAL EVERY 12 HOURS
COMMUNITY

## 2024-01-16 NOTE — ASSESSMENT & PLAN NOTE
Mucinex D contain Sudafed which could cause elevated blood pressure or heart rate.  Recommend plain Claritin, Zyrtec, or Allegra and saline rinses.  Follow-up if not improving with doxycycline.  Take doxycycline with food.

## 2024-01-16 NOTE — ASSESSMENT & PLAN NOTE
Advise counseling and behavioral modification.  Start buspirone for anxiety.  Any medication to help improve mood holds a small possibility of worsening mood.  Call the office if any concerns.  Follow-up with PCP in 1 month or sooner if concerns

## 2024-01-16 NOTE — ASSESSMENT & PLAN NOTE
Normal, formed bowel movement this morning.  Discussed blood work uncontrolled close you for further evaluation if symptoms were to persist.  Patient defers labs at this time.  Will let me know if wishes to pursue labs.

## 2024-01-16 NOTE — PROGRESS NOTES
"Chief Complaint  Cough (Patient states this has been going on for 4 days. Patient declined covid/flu testing ), Nasal Congestion, Fatigue, Diarrhea, and Vomiting    Subjective          Juan M Lynch presents to [facility]     Patient is a 38-year-old male who started 4 days ago with nasal congestion, fatigue, vomiting, and diarrhea.  Is not certain if stress is a contributor or not.  He reports work-related stress and exposure to cardboard that he feels as a contributor to his nasal symptoms.  He has had 6 loose stools and 6 episodes of vomiting in the last 4 days.  He had a few minutes of abdominal pain that resolved completely.  His last bowel movement this morning was formed and normal for him.  He has not had any blood or mucus in his bowel movements.  Taking Mucinex D.  Stop Flonase due to nasal irritation.  Symptoms feel similar to respiratory infection for which she had taken antibiotics with success 2 months ago.  Defers COVID or flu testing.  Needs work note for yesterday today and guidance on whether to return to work tomorrow or not.    Patient request medication to take for work-related anxiety.  He denies depression.  Anxiety has affected his sleep to a mild degree.  He would like to have a refill of promethazine for nausea as needed.    Objective   Vital Signs:   Vitals:    01/16/24 1044 01/16/24 1112 01/16/24 1249   BP: 136/97 128/88 130/82   BP Location: Right arm Left arm Left arm   Patient Position: Sitting Sitting Sitting   Cuff Size: Adult  Adult   Pulse: 73     Temp: 98.3 °F (36.8 °C)     TempSrc: Oral     SpO2: 97%     Weight: 69.4 kg (153 lb)     Height: 165.1 cm (65\")         Wt Readings from Last 3 Encounters:   01/16/24 69.4 kg (153 lb)   11/09/23 70.4 kg (155 lb 3.2 oz)   07/28/23 70.2 kg (154 lb 12.8 oz)      BP Readings from Last 3 Encounters:   01/16/24 130/82   11/09/23 139/87   07/28/23 129/80       Body mass index is 25.46 kg/m².         "     Physical Exam  Vitals reviewed.   Constitutional:       General: He is not in acute distress.     Appearance: Normal appearance. He is well-developed.   HENT:      Right Ear: Tympanic membrane normal.      Left Ear: Tympanic membrane normal.      Mouth/Throat:      Pharynx: Oropharynx is clear. No oropharyngeal exudate or posterior oropharyngeal erythema.   Cardiovascular:      Rate and Rhythm: Normal rate and regular rhythm.      Heart sounds: Normal heart sounds.   Pulmonary:      Effort: Pulmonary effort is normal.      Breath sounds: Normal breath sounds.   Abdominal:      General: Abdomen is flat. Bowel sounds are normal. There is no distension.      Palpations: Abdomen is soft. There is no mass.      Tenderness: There is no abdominal tenderness. There is no guarding or rebound.   Musculoskeletal:      Right lower leg: No edema.      Left lower leg: No edema.   Skin:     General: Skin is warm and dry.   Neurological:      General: No focal deficit present.      Mental Status: He is alert.   Psychiatric:         Attention and Perception: Attention normal.         Mood and Affect: Mood and affect normal.         Behavior: Behavior normal.           Current Outpatient Medications:     hydrocortisone 2.5 % cream, Apply 1 application  topically to the appropriate area as directed 2 (Two) Times a Day., Disp: 28 g, Rfl: 0    pseudoephedrine-guaifenesin (MUCINEX D)  MG per 12 hr tablet, Take 1 tablet by mouth Every 12 (Twelve) Hours., Disp: , Rfl:     Synthroid 50 MCG tablet, Take 1 tablet by mouth Daily., Disp: 90 tablet, Rfl: 0    busPIRone (BUSPAR) 5 MG tablet, Take 1 tablet by mouth 2 (Two) Times a Day., Disp: 60 tablet, Rfl: 0    doxycycline (MONODOX) 100 MG capsule, Take 1 capsule by mouth 2 (Two) Times a Day., Disp: 14 capsule, Rfl: 0    fluticasone (FLONASE) 50 MCG/ACT nasal spray, 2 sprays by Each Nare route Daily. 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    Thoracic outlet syndrome 08/01/2022    Thyroid cancer     Thyroid nodule      Allergies   Allergen Reactions    Robitussin Severe Cgh-Sr Thrt [Acetaminophen-Dm] Anaphylaxis               Result Review :     Common labs          8/1/2023    09:20   Common Labs   Glucose 99    BUN 12    Creatinine 1.11    Sodium 137    Potassium 4.2    Chloride 104    Calcium 9.4    Albumin 4.3    Total Bilirubin 0.3    Alkaline Phosphatase 70    AST (SGOT) 31    ALT (SGPT) 39    WBC 7.17    Hemoglobin 14.5    Hematocrit 42.8    Platelets 228    Total Cholesterol 237    Triglycerides 121    HDL Cholesterol 52    LDL Cholesterol  163         No Images in the past 120 days found..           Social History     Tobacco Use   Smoking Status Never    Passive exposure: Past   Smokeless Tobacco Never           Assessment and Plan    Diagnoses and all orders for this visit:    1. Anxiety (Primary)  Assessment & Plan:  Advise counseling and behavioral modification.  Start buspirone for anxiety.  Any medication to help improve mood holds a small possibility of worsening mood.  Call the office if any concerns.  Follow-up with PCP in 1 month or sooner if concerns    Orders:  -     busPIRone (BUSPAR) 5 MG tablet; Take 1 tablet by mouth 2 (Two) Times a Day.  Dispense: 60 tablet; Refill: 0    2. Acute recurrent frontal sinusitis  Assessment & Plan:  Mucinex D contain Sudafed which could cause elevated blood pressure or heart rate.  Recommend plain Claritin, Zyrtec, or Allegra and saline rinses.  Follow-up if not improving with doxycycline.  Take doxycycline with food.    Orders:  -     doxycycline (MONODOX) 100 MG capsule; Take 1 capsule by mouth 2 (Two) Times a Day.   Dispense: 14 capsule; Refill: 0    3. Nausea  -     promethazine (PHENERGAN) 25 MG tablet; Take 1 tablet by mouth Every 6 (Six) Hours As Needed for Nausea or Vomiting.  Dispense: 20 tablet; Refill: 0    4. Diarrhea, unspecified type  Assessment & Plan:  Normal, formed bowel movement this morning.  Discussed blood work uncontrolled close you for further evaluation if symptoms were to persist.  Patient defers labs at this time.  Will let me know if wishes to pursue labs.          Follow Up    Return in about 4 weeks (around 2/13/2024).  Patient was given instructions and counseling regarding his condition or for health maintenance advice. Please see specific information pulled into the AVS if appropriate.

## 2024-01-24 NOTE — TELEPHONE ENCOUNTER
PATIENT WAS CALLED TO REMIND THEM OF NEW PATIENT TOS APPT WITH [facility]ER ON 11/17 AT 1045 AM. DETAILS TO CALL BACK TO CONFIRM.   Shingles injection given. Verbal order for injection from . Patient tolerated without incident. See MAR for documentation.

## 2024-01-31 ENCOUNTER — OFFICE VISIT (OUTPATIENT)
Dept: FAMILY MEDICINE CLINIC | Age: 39
End: 2024-01-31
Payer: COMMERCIAL

## 2024-01-31 ENCOUNTER — LAB (OUTPATIENT)
Dept: LAB | Facility: HOSPITAL | Age: 39
End: 2024-01-31
Payer: COMMERCIAL

## 2024-01-31 VITALS
HEIGHT: 65 IN | WEIGHT: 153 LBS | TEMPERATURE: 98.2 F | HEART RATE: 100 BPM | OXYGEN SATURATION: 97 % | BODY MASS INDEX: 25.49 KG/M2 | DIASTOLIC BLOOD PRESSURE: 67 MMHG | SYSTOLIC BLOOD PRESSURE: 125 MMHG

## 2024-01-31 DIAGNOSIS — F41.9 ANXIETY: ICD-10-CM

## 2024-01-31 DIAGNOSIS — E03.9 ACQUIRED HYPOTHYROIDISM: Primary | ICD-10-CM

## 2024-01-31 DIAGNOSIS — J31.0 RHINITIS, UNSPECIFIED TYPE: ICD-10-CM

## 2024-01-31 DIAGNOSIS — E03.9 ACQUIRED HYPOTHYROIDISM: ICD-10-CM

## 2024-01-31 LAB — TSH SERPL DL<=0.05 MIU/L-ACNC: 1.31 UIU/ML (ref 0.27–4.2)

## 2024-01-31 PROCEDURE — 36415 COLL VENOUS BLD VENIPUNCTURE: CPT

## 2024-01-31 PROCEDURE — 99214 OFFICE O/P EST MOD 30 MIN: CPT | Performed by: NURSE PRACTITIONER

## 2024-01-31 PROCEDURE — 84443 ASSAY THYROID STIM HORMONE: CPT

## 2024-01-31 RX ORDER — LEVOTHYROXINE SODIUM 50 MCG
50 TABLET ORAL DAILY
Qty: 90 TABLET | Refills: 1 | Status: SHIPPED | OUTPATIENT
Start: 2024-01-31 | End: 2024-02-02 | Stop reason: SDUPTHER

## 2024-01-31 RX ORDER — FLUTICASONE PROPIONATE 50 MCG
2 SPRAY, SUSPENSION (ML) NASAL DAILY
Qty: 16 ML | Refills: 1 | Status: SHIPPED | OUTPATIENT
Start: 2024-01-31

## 2024-01-31 RX ORDER — HYDROXYZINE HYDROCHLORIDE 25 MG/1
25 TABLET, FILM COATED ORAL EVERY 8 HOURS PRN
Qty: 40 TABLET | Refills: 1 | Status: SHIPPED | OUTPATIENT
Start: 2024-01-31

## 2024-01-31 NOTE — PROGRESS NOTES
Chief Complaint  Juan M Lynch presents to [facility] for Hypothyroidism (Follow up)    Subjective          History of Present Illness    Mendoza is here for follow up on thyroid disease. He is s/p thyroidectomy for thyroid CA. He is on name brand Synthroid 50 mcg daily. He was having fatigue with generic levothyroxine. This has improved since being on name brand Synthroid. Last TSH normal on 8/1/23.   He was prescribed buspirone at visit on 1/16/24 for anxiety/stress. He reports that he was not able to tolerate. It made him 'feel weird.' He has had some stressors at work.     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 16 mL 1    hydrocortisone 2.5 % cream Apply 1 application  topically to the appropriate area as directed 2 (Two) Times a Day. 28 g 0    Synthroid 50 MCG tablet Take 1 tablet by mouth Daily. 90 tablet 1    hydrOXYzine (ATARAX) 25 MG tablet Take 1 tablet by mouth Every 8 (Eight) Hours As Needed for Anxiety. May cause drowsiness 40 tablet 1     No current facility-administered medications for this visit.     Past Surgical History:   Procedure Laterality Date    ELBOW PROCEDURE      FIRST RIB RESECTION Right 8/8/2022    Procedure: BRONCHOSCOPY, RIGHT THORACOSCOPY VIDEO ASSISTED WITH RESECTION OF RIGHT FIRST RIB, INTERCOSTAL NERVE BLOCK;  Surgeon: Manan Gasca III, MD;  Location: [facility] OR;  Service: Thoracic;  Laterality: Right;    NASAL ENDOSCOPY N/A 5/16/2022    Procedure: NASAL ENDOSCOPY;  Surgeon:  "Reji Beebe MD;  Location: [facility];  Service: ENT;  Laterality: N/A;    THYROIDECTOMY Right 5/16/2022    Procedure: Right THYROIDECTOMY, excision of nasal polyps;  Surgeon: Reji Beebe MD;  Location: [facility];  Service: ENT;  Laterality: Right;    VASECTOMY        Social History     Tobacco Use    Smoking status: Never     Passive exposure: Past    Smokeless tobacco: Never   Vaping Use    Vaping Use: Never used   Substance Use Topics    Alcohol use: Yes     Comment: DAILY- BEER/LIQUOR    Drug use: Yes     Types: Marijuana     Comment: \"SOME\"     Family History   Problem Relation Age of Onset    No Known Problems Mother     No Known Problems Father     Malig Hyperthermia Neg Hx      There are no preventive care reminders to display for this patient.   Immunization History   Administered Date(s) Administered    Td (TDVAX) 09/24/2001        Objective     Vitals:    01/31/24 0807   BP: 125/67   BP Location: Left arm   Patient Position: Sitting   Cuff Size: Adult   Pulse: 100   Temp: 98.2 °F (36.8 °C)   TempSrc: Oral   SpO2: 97%   Weight: 69.4 kg (153 lb)   Height: 165.1 cm (65\")     Body mass index is 25.46 kg/m².             Physical Exam  Vitals reviewed.   Constitutional:       General: He is not in acute distress.     Appearance: Normal appearance. He is well-developed.   HENT:      Head: Normocephalic and atraumatic.   Cardiovascular:      Rate and Rhythm: Normal rate and regular rhythm.   Pulmonary:      Effort: Pulmonary effort is normal.      Breath sounds: Normal breath sounds.   Neurological:      Mental Status: He is alert and oriented to person, place, and time.   Psychiatric:         Mood and Affect: Mood and affect normal.           Result Review :                               Assessment and Plan      Diagnoses and all orders for this visit:    1. Acquired hypothyroidism (Primary)  Comments:  Improvement with name brand Synthroid. Repeating labs.  Orders:  -     Synthroid 50 MCG " tablet; Take 1 tablet by mouth Daily.  Dispense: 90 tablet; Refill: 1  -     TSH Rfx On Abnormal To Free T4; Future    2. Rhinitis, unspecified type  Comments:  Medical condition is stable.  Continue same therapy.  Will recheck at next regular appointment  Orders:  -     fluticasone (FLONASE) 50 MCG/ACT nasal spray; 2 sprays by Each Nare route Daily.  Dispense: 16 mL; Refill: 1    3. Anxiety  Comments:  Rx hydroxyzine to take PRN. Let me know of concerns.  Orders:  -     hydrOXYzine (ATARAX) 25 MG tablet; Take 1 tablet by mouth Every 8 (Eight) Hours As Needed for Anxiety. May cause drowsiness  Dispense: 40 tablet; Refill: 1                Follow Up     Return in about 6 months (around 7/31/2024) for Annual physical.

## 2024-02-02 ENCOUNTER — TELEPHONE (OUTPATIENT)
Dept: FAMILY MEDICINE CLINIC | Age: 39
End: 2024-02-02
Payer: COMMERCIAL

## 2024-02-02 DIAGNOSIS — E03.9 ACQUIRED HYPOTHYROIDISM: ICD-10-CM

## 2024-02-02 RX ORDER — LEVOTHYROXINE SODIUM 50 MCG
50 TABLET ORAL DAILY
Qty: 180 TABLET | Refills: 0 | Status: SHIPPED | OUTPATIENT
Start: 2024-02-02

## 2024-02-02 NOTE — TELEPHONE ENCOUNTER
"    Caller: Juan M Lynch \"Mendoza\"    Relationship to patient: Self    Best call back number: [phone]    Patient is needing: PATIENT CALLED IN AND HAS CONTACTED INSURANCE AND WAS TOLD HE COULD HAVE 180 DAY SUPPLY OF Synthroid 50 MCG tablet IF PCP WOULD DO A PRIOR AUTHORIZATION. PATIENT WOULD LIKE TO HAVE 6 MONTH OR YEAR SUPPLY AT A TIME IF POSSIBLE. PATIENT SAID IT IS OKAY TO LEAVE MESSAGE ON MYCHART OR ON VOICEMAIL.          "

## 2024-02-12 DIAGNOSIS — F41.9 ANXIETY: ICD-10-CM

## 2024-02-12 RX ORDER — BUSPIRONE HYDROCHLORIDE 5 MG/1
5 TABLET ORAL 2 TIMES DAILY
Qty: 60 TABLET | Refills: 0 | OUTPATIENT
Start: 2024-02-12

## 2024-03-28 ENCOUNTER — TELEPHONE (OUTPATIENT)
Dept: FAMILY MEDICINE CLINIC | Age: 39
End: 2024-03-28
Payer: COMMERCIAL

## 2024-03-28 NOTE — TELEPHONE ENCOUNTER
"  Caller: Jua nM Lynch \"Mendoza\"    Relationship: Self    Best call back number: [phone]     What was the call regarding: PATIENT STATES HE NEEDS THE OFFICE CALL [facility] AND SET UP AUTHORIZATION SO THAT THEY WILL TAKE UP PAYING THEIR HALF OF THE PATIENTS SYNTHROID. PATIENT STATES [facility] IS A SECONDARY INSURANCE AND THEY INFORMED HIM IF WE CALL THEN THEY WILL PAY FOR HALF OF THE MEDICATION INSTEAD OF THE PATIENT PAYING FOR ALL OF IT.    PHONE NUMBER:     [phone]  "

## 2024-03-29 ENCOUNTER — TELEMEDICINE (OUTPATIENT)
Dept: FAMILY MEDICINE CLINIC | Age: 39
End: 2024-03-29
Payer: COMMERCIAL

## 2024-03-29 DIAGNOSIS — J30.1 SEASONAL ALLERGIC RHINITIS DUE TO POLLEN: ICD-10-CM

## 2024-03-29 DIAGNOSIS — J32.9 SINUSITIS, UNSPECIFIED CHRONICITY, UNSPECIFIED LOCATION: Primary | ICD-10-CM

## 2024-03-29 PROCEDURE — 99213 OFFICE O/P EST LOW 20 MIN: CPT | Performed by: NURSE PRACTITIONER

## 2024-03-29 RX ORDER — AMOXICILLIN 875 MG/1
875 TABLET, COATED ORAL 2 TIMES DAILY
Qty: 20 TABLET | Refills: 0 | Status: SHIPPED | OUTPATIENT
Start: 2024-03-29 | End: 2024-04-08

## 2024-03-29 RX ORDER — LORATADINE 10 MG/1
10 TABLET ORAL DAILY
Qty: 90 TABLET | Refills: 3 | Status: SHIPPED | OUTPATIENT
Start: 2024-03-29

## 2024-03-29 NOTE — LETTER
March 29, 2024     Patient: Juan M Lynch   YOB: 1985   Date of Visit: 3/29/2024       To Whom It May Concern:    It is my medical opinion that Juan M Lynch  be excused from work on Friday 3/29/24. .           Sincerely,        KADEN Kenny    CC: No Recipients

## 2024-03-29 NOTE — PROGRESS NOTES
Chief Complaint  Juan M Lynch presents to [facility] for Nasal Congestion (Congestion, lower back pain, cough, chills/Video visit: [phone])    Subjective          History of Present Illness    Today's encounter is being done with a telehealth visit. Pt has consented verbally with two witnesses for todays treatment. Todays visit is being conducted by audio and video. Individuals present during the telemedicine consultation include Mariana Frank MA.   Patient aware that as visit is being performed as a video conference there will be no opportunity to obtain vital signs or perform a thorough physical exam. Due to this there is unfortunately a possibility that things may be missed that would typically be noticed during a traditional visit. Patient is aware of this possibility and agrees to proceed with the video conference.     Mendoza is being seen today for c/o nasal congestion, headache, sinus pressure, cough, chills, watery eyes. He has taken Benadryl, Tylenol cold and flu, and Flonase. He reports that he has taken amoxicillin in the past without adverse reaction.     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 16 mL 1    hydrocortisone 2.5 % cream Apply 1 application  topically to the appropriate area as directed 2 (Two) Times a Day. (Patient taking differently: Apply 1 Application topically to the appropriate area as directed As Needed.) 28 g 0    hydrOXYzine (ATARAX) 25 MG tablet Take 1 tablet by mouth Every 8 (Eight)  "Hours As Needed for Anxiety. May cause drowsiness 40 tablet 1    amoxicillin (AMOXIL) 875 MG tablet Take 1 tablet by mouth 2 (Two) Times a Day for 10 days. 20 tablet 0    loratadine (Claritin) 10 MG tablet Take 1 tablet by mouth Daily. 90 tablet 3    Synthroid 50 MCG tablet Take 1 tablet by mouth Daily. 180 tablet 0     No current facility-administered medications for this visit.     Past Surgical History:   Procedure Laterality Date    ELBOW PROCEDURE      FIRST RIB RESECTION Right 8/8/2022    Procedure: BRONCHOSCOPY, RIGHT THORACOSCOPY VIDEO ASSISTED WITH RESECTION OF RIGHT FIRST RIB, INTERCOSTAL NERVE BLOCK;  Surgeon: Manan Gasca III, MD;  Location: [facility] OR;  Service: Thoracic;  Laterality: Right;    NASAL ENDOSCOPY N/A 5/16/2022    Procedure: NASAL ENDOSCOPY;  Surgeon: Reji Beebe MD;  Location: [facility];  Service: ENT;  Laterality: N/A;    THYROIDECTOMY Right 5/16/2022    Procedure: Right THYROIDECTOMY, excision of nasal polyps;  Surgeon: Reji Beebe MD;  Location: [facility] OR [facility];  Service: ENT;  Laterality: Right;    VASECTOMY        Social History     Tobacco Use    Smoking status: Never     Passive exposure: Past    Smokeless tobacco: Never   Vaping Use    Vaping status: Never Used   Substance Use Topics    Alcohol use: Yes     Comment: DAILY- BEER/LIQUOR    Drug use: Yes     Types: Marijuana     Comment: \"SOME\"     Family History   Problem Relation Age of Onset    No Known Problems Mother     No Known Problems Father     Malig Hyperthermia Neg Hx      There are no preventive care reminders to display for this patient.   Immunization History   Administered Date(s) Administered    Td (TDVAX) 09/24/2001        Objective     There were no vitals filed for this visit.  There is no height or weight on file to calculate BMI.                No results found.    Physical Exam  Vitals reviewed.   Constitutional:       General: He is not in acute distress.     Appearance: Normal " appearance. He is well-developed.   HENT:      Head: Normocephalic and atraumatic.   Pulmonary:      Effort: Pulmonary effort is normal.   Neurological:      Mental Status: He is alert and oriented to person, place, and time.   Psychiatric:         Mood and Affect: Mood and affect normal.           Result Review :                               Assessment and Plan      Assessment & Plan  Sinusitis, unspecified chronicity, unspecified location  Rest, fluids. Ibuprofen per package instructions. Rx amoxicillin. May increase fluticasone to twice daily while symptomatic.   Seasonal allergic rhinitis due to pollen  Will add loratadine daily.      New Medications Ordered This Visit   Medications    loratadine (Claritin) 10 MG tablet     Sig: Take 1 tablet by mouth Daily.     Dispense:  90 tablet     Refill:  3    amoxicillin (AMOXIL) 875 MG tablet     Sig: Take 1 tablet by mouth 2 (Two) Times a Day for 10 days.     Dispense:  20 tablet     Refill:  0                 Follow Up     Return for As needed for persistent or worsening symptoms, Next scheduled follow up.

## 2024-04-02 NOTE — TELEPHONE ENCOUNTER
PA initiated via phone, spoke to Jessie PINEDA - it has been sent to clinical team for further review. Reference # [id number]

## 2024-04-05 ENCOUNTER — OFFICE VISIT (OUTPATIENT)
Dept: FAMILY MEDICINE CLINIC | Age: 39
End: 2024-04-05
Payer: COMMERCIAL

## 2024-04-05 ENCOUNTER — TELEPHONE (OUTPATIENT)
Dept: FAMILY MEDICINE CLINIC | Age: 39
End: 2024-04-05

## 2024-04-05 VITALS
DIASTOLIC BLOOD PRESSURE: 79 MMHG | HEART RATE: 70 BPM | TEMPERATURE: 98.1 F | SYSTOLIC BLOOD PRESSURE: 131 MMHG | OXYGEN SATURATION: 99 % | HEIGHT: 65 IN | WEIGHT: 157.46 LBS | BODY MASS INDEX: 26.23 KG/M2

## 2024-04-05 DIAGNOSIS — E03.9 ACQUIRED HYPOTHYROIDISM: Primary | ICD-10-CM

## 2024-04-05 DIAGNOSIS — R53.83 OTHER FATIGUE: ICD-10-CM

## 2024-04-05 PROCEDURE — 99214 OFFICE O/P EST MOD 30 MIN: CPT | Performed by: NURSE PRACTITIONER

## 2024-04-05 NOTE — TELEPHONE ENCOUNTER
Pt brought in [facility] papers and paid the 20$ fee. Papers scanned in and placed in [place]'s mailbox.

## 2024-04-05 NOTE — PROGRESS NOTES
Chief Complaint  Juan M Lynch presents to [facility] for FMLA (Needing for thyroid issues )    Subjective          History of Present Illness    Mendoza has thyroid disease. He is s/p thyroidectomy for thyroid CA. He previously had fatigue with generic levothyroxine. Was taking name brand Synthroid 50 mcg daily. Has not taken for the past 3 weeks as awaiting insurance approval. Appeal letter has been sent. He has been working overtime at work. Feeling stressed and fatigued. He is requesting intermittent FMLA. He reports that he has felt run down since having his thyroid surgery. Feels down and depressed but does not feel that he needs depression medication. TSH was in normal range when on name brand Synthroid.     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 (Patient taking differently: 2 sprays into the nostril(s) as directed by provider As Needed.) 16 mL 1    hydrocortisone 2.5 % cream Apply 1 application  topically to the appropriate area as directed 2 (Two) Times a Day. (Patient taking differently: Apply 1 Application topically to the appropriate area as directed As Needed.) 28 g 0    hydrOXYzine (ATARAX) 25 MG tablet Take 1 tablet by mouth Every 8 (Eight) Hours As Needed for Anxiety. May cause drowsiness 40 tablet 1    Synthroid 50 MCG tablet Take 1 tablet by mouth Daily. 180 tablet 0     No current facility-administered medications for this visit.     Past Surgical History:   Procedure Laterality Date    ELBOW PROCEDURE       "FIRST RIB RESECTION Right 8/8/2022    Procedure: BRONCHOSCOPY, RIGHT THORACOSCOPY VIDEO ASSISTED WITH RESECTION OF RIGHT FIRST RIB, INTERCOSTAL NERVE BLOCK;  Surgeon: Manan Gasca III, MD;  Location: [facility] OR;  Service: Thoracic;  Laterality: Right;    NASAL ENDOSCOPY N/A 5/16/2022    Procedure: NASAL ENDOSCOPY;  Surgeon: Reji Beebe MD;  [place] [facility];  Service: ENT;  Laterality: N/A;    THYROIDECTOMY Right 5/16/2022    Procedure: Right THYROIDECTOMY, excision of nasal polyps;  Surgeon: Reji Beebe MD;  [place] [facility] [place] [facility];  Service: ENT;  Laterality: Right;    VASECTOMY        Social History     Tobacco Use    Smoking status: Never     Passive exposure: Past    Smokeless tobacco: Never   Vaping Use    Vaping status: Never Used   Substance Use Topics    Alcohol use: Yes     Comment: DAILY- BEER/LIQUOR    Drug use: Yes     Types: Marijuana     Comment: \"SOME\"     Family History   Problem Relation Age of Onset    No Known Problems Mother     No Known Problems Father     Malig Hyperthermia Neg Hx      There are no preventive care reminders to display for this patient.   Immunization History   Administered Date(s) Administered    Td (TDVAX) 09/24/2001        Objective     Vitals:    04/05/24 1411   BP: 131/79   BP Location: Left arm   Patient Position: Sitting   Cuff Size: Large Adult   Pulse: 70   Temp: 98.1 °F (36.7 °C)   TempSrc: Oral   SpO2: 99%   Weight: 71.4 kg (157 lb 7.4 oz)   Height: 165.1 cm (65\")     Body mass index is 26.2 kg/m².                No results found.    Physical Exam  Vitals reviewed.   Constitutional:       General: He is not in acute distress.     Appearance: Normal appearance. He is well-developed.   HENT:      Head: Normocephalic and atraumatic.   Cardiovascular:      Rate and Rhythm: Normal rate.   Pulmonary:      Effort: Pulmonary effort is normal.   Neurological:      Mental Status: He is alert and oriented to person, place, and time. "   Psychiatric:         Mood and Affect: Mood and affect normal.           Result Review :                               Assessment and Plan      Assessment & Plan  Acquired hypothyroidism  Working on appeal letter for name brand Synthroid. Agreeable to [facility] for 6 days per month for 6 months. To work no more than 40 hours per week. He is having increased fatigue with being off medication. Will also refer him to endocrinology with his symptoms.   Other fatigue      Orders Placed This Encounter   Procedures    Ambulatory Referral to Endocrinology                    Follow Up     No follow-ups on file.

## 2024-04-05 NOTE — ASSESSMENT & PLAN NOTE
Working on appeal letter for name brand Synthroid. Agreeable to [facility] for 6 days per month for 6 months. To work no more than 40 hours per week. He is having increased fatigue with being off medication. Will also refer him to endocrinology with his symptoms.

## 2024-04-09 ENCOUNTER — TELEPHONE (OUTPATIENT)
Dept: FAMILY MEDICINE CLINIC | Age: 39
End: 2024-04-09
Payer: COMMERCIAL

## 2024-04-09 NOTE — TELEPHONE ENCOUNTER
Caller: [facility]    Relationship to patient:     Best call back number: [phone]    Patient is needing: CALLER FROM [facility] CALLED TO LET KADEN REA KNOW THE APPEAL FOR THE PATIENTS SYNTHROID AUTHORIZATION HAD BEEN DENIED AGAIN. CALLER STATES THERE IS NOT ENOUGH PROOF STATING WHY THE PATIENT NEEDS NAME BRAND OPPOSED TO GENERIC.

## 2024-05-21 ENCOUNTER — OFFICE VISIT (OUTPATIENT)
Dept: ENDOCRINOLOGY | Age: 39
End: 2024-05-21

## 2024-05-21 VITALS
WEIGHT: 153.6 LBS | HEART RATE: 60 BPM | SYSTOLIC BLOOD PRESSURE: 116 MMHG | HEIGHT: 65 IN | TEMPERATURE: 97.1 F | BODY MASS INDEX: 25.59 KG/M2 | OXYGEN SATURATION: 99 % | DIASTOLIC BLOOD PRESSURE: 80 MMHG

## 2024-05-21 DIAGNOSIS — E89.0 POSTOPERATIVE HYPOTHYROIDISM: Primary | ICD-10-CM

## 2024-05-21 PROCEDURE — 99243 OFF/OP CNSLTJ NEW/EST LOW 30: CPT | Performed by: NURSE PRACTITIONER

## 2024-05-21 RX ORDER — LEVOTHYROXINE SODIUM 50 UG/1
50 TABLET ORAL
Qty: 90 TABLET | Refills: 1 | Status: SHIPPED | OUTPATIENT
Start: 2024-05-21

## 2024-05-21 RX ORDER — LEVOTHYROXINE SODIUM 50 UG/1
50 TABLET ORAL
Qty: 30 TABLET | Refills: 0 | COMMUNITY
Start: 2024-05-21

## 2024-05-21 NOTE — PROGRESS NOTES
"Chief Complaint  Chief Complaint   Patient presents with    Hypothyroidism     Pt states that energy levels have been low, weight is lower than expected, no family hx of thyroid disease.        Subjective          History of Present Illness    Juan M Lynch 38 y.o. presents for a evaluation of Post-Surgical Hypothyroidism      He is s/p hemithyroidectomy: right thyroid lobe and isthmus in 05/2022    Pathology showed:               -Follicular adenoma               -1 normocellular parathyroid gland               -1 benign perithyroidal lymph node               -Negative for malignancy      He complains of fatigue.    Denies hair loss, dry skin, diarrhea, constipation, shortness of breath,chest pain, palpitations, heat intolerance and cold intolerance.        Current treatment is branded Synthroid 50 mcg daily - off since Feb 2023  Levothyroxine did not control symptoms      Last labs in 01/24 showed TSH 1.310       I have reviewed the patient's allergies, medicines, past medical hx, family hx and social hx.    Objective   Vital Signs:   /80   Pulse 60   Temp 97.1 °F (36.2 °C) (Temporal)   Ht 165.1 cm (65\")   Wt 69.7 kg (153 lb 9.6 oz)   SpO2 99%   BMI 25.56 kg/m²       Physical Exam   Physical Exam  Constitutional:       General: He is not in acute distress.     Appearance: Normal appearance. He is not diaphoretic.   HENT:      Head: Normocephalic and atraumatic.   Eyes:      General:         Right eye: No discharge.         Left eye: No discharge.   Skin:     General: Skin is warm and dry.   Neurological:      Mental Status: He is alert.   Psychiatric:         Mood and Affect: Mood normal.         Behavior: Behavior normal.                    Results Review:   TSH   Date Value Ref Range Status   01/31/2024 1.310 0.270 - 4.200 uIU/mL Final         Assessment and Plan    Diagnoses and all orders for this visit:    1. Postoperative hypothyroidism (Primary)  -     Unithroid 50 MCG tablet; Take 1 tablet by " mouth Every Morning. Take on an empty stomach with just water 30 min to an hour before any other medications, food or drink  Dispense: 90 tablet; Refill: 1  -     Unithroid 50 MCG tablet; Take 1 tablet by mouth Every Morning. Take on an empty stomach with just water 30 min to an hour before any other medications, food or drink  Dispense: 30 tablet; Refill: 0  -     TSH Rfx On Abnormal To Free T4; Future      Pt has been off medication since Feb. 2024  Insurance will not cover branded Synthroid and he has tried and failed generic levothyroxine.  Will try for branded Unithroid - for now he and use [employer] to help pay for it until we can see what branded form insurance will cover  Will check labs in 8 weeks - he is going to get completed in [facility]          Labs in 8 weeks  RTC in 6 months with me      Follow Up     Patient was given instructions and counseling regarding her condition or for health maintenance advice. Please see specific information pulled into the AVS if appropriate.              Swetha Galvan, KADEN  05/21/24

## 2024-07-31 ENCOUNTER — OFFICE VISIT (OUTPATIENT)
Dept: FAMILY MEDICINE CLINIC | Age: 39
End: 2024-07-31
Payer: MEDICAID

## 2024-07-31 VITALS
HEART RATE: 69 BPM | TEMPERATURE: 98.5 F | BODY MASS INDEX: 24.86 KG/M2 | HEIGHT: 65 IN | SYSTOLIC BLOOD PRESSURE: 129 MMHG | DIASTOLIC BLOOD PRESSURE: 88 MMHG | OXYGEN SATURATION: 100 % | WEIGHT: 149.2 LBS

## 2024-07-31 DIAGNOSIS — Z00.00 ANNUAL PHYSICAL EXAM: Primary | ICD-10-CM

## 2024-07-31 DIAGNOSIS — F32.A ANXIETY AND DEPRESSION: ICD-10-CM

## 2024-07-31 DIAGNOSIS — F41.9 ANXIETY AND DEPRESSION: ICD-10-CM

## 2024-07-31 PROCEDURE — 1125F AMNT PAIN NOTED PAIN PRSNT: CPT | Performed by: NURSE PRACTITIONER

## 2024-07-31 PROCEDURE — 99396 PREV VISIT EST AGE 40-64: CPT | Performed by: NURSE PRACTITIONER

## 2024-07-31 PROCEDURE — 1159F MED LIST DOCD IN RCRD: CPT | Performed by: NURSE PRACTITIONER

## 2024-07-31 PROCEDURE — 1160F RVW MEDS BY RX/DR IN RCRD: CPT | Performed by: NURSE PRACTITIONER

## 2024-07-31 RX ORDER — ESCITALOPRAM OXALATE 5 MG/1
5 TABLET ORAL DAILY
Qty: 90 TABLET | Refills: 0 | Status: SHIPPED | OUTPATIENT
Start: 2024-07-31

## 2024-07-31 NOTE — PROGRESS NOTES
Chief Complaint  Juan M Lynch presents to [facility] for Annual Exam    Subjective          History of Present Illness    Mendoza is here today for annual preventive exam.   Declines covid and Tdap vaccines.  Never smoker.    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 He has been taking since 5/21/24 visit with them. Has lab orders with [facility] for repeat in his chart. He notes some fatigue. Previously unable to tolerate levothyroxine and insurance would not previously cover name brand Synthroid.   He has been stressed about employment and finances. He has been on hydroxyzine for anxiety in the past but did not tolerate well due to grogginess. He reports that he has no drive and has felt apathetic. 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 He was on zoloft when younger. His mom had him stop taking that as well as ADHD medication. He has also tried Wellbutrin previously when he was a teenager. Has not been on medication since then.   Has been taking lysine for cold sore. This has been helpful. Declines need for further intervention.    Review of Systems   Constitutional:  Positive for fatigue. Negative for chills and fever.   HENT:  Negative for ear pain and sore throat.    Eyes:  Negative for blurred vision and redness.   Respiratory:  Negative for shortness of breath and wheezing.    Cardiovascular:  Negative for chest pain and palpitations.   Gastrointestinal:  Negative for abdominal pain and vomiting.   Genitourinary:  Negative for frequency and urgency.   Skin:  Negative for rash.   Neurological:  Negative for seizures and syncope.   Psychiatric/Behavioral:  Positive for depressed mood and stress. Negative for self-injury and suicidal ideas.          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 (Patient taking differently: 2 sprays into the nostril(s) as directed by provider As Needed.) 16 mL 1    hydrocortisone 2.5 % cream Apply 1 application  topically to the appropriate area as directed 2 (Two) Times a Day. (Patient taking differently: Apply 1 Application topically to the appropriate area as directed As Needed.) 28 g 0    Unithroid 50 MCG tablet Take 1 tablet by mouth Every Morning. Take on an empty stomach with just water 30 min to an hour before any other medications, food or drink 30 tablet 0    escitalopram (LEXAPRO) 5 MG tablet Take 1 tablet by mouth Daily. 90 tablet 0     No current facility-administered medications for this visit.     Past Surgical History:   Procedure Laterality Date    ELBOW PROCEDURE      FIRST RIB RESECTION Right 8/8/2022    Procedure: BRONCHOSCOPY, RIGHT THORACOSCOPY VIDEO ASSISTED WITH RESECTION OF RIGHT FIRST RIB, INTERCOSTAL NERVE BLOCK;  Surgeon: Manan Gasca III, MD;  Location: [facility];  Service: Thoracic;  Laterality: Right;    NASAL ENDOSCOPY N/A 5/16/2022    Procedure: NASAL ENDOSCOPY;  Surgeon: Reji Beebe MD;  Location: [facility];  Service: ENT;  Laterality: N/A;    THYROIDECTOMY Right 5/16/2022    Procedure: Right THYROIDECTOMY, excision of nasal polyps;  Surgeon: Reji Beebe MD;  [facility]: [facility];  Service: ENT;  Laterality: Right;    VASECTOMY        Social History     Tobacco Use    Smoking status: Never     Passive exposure: Past    Smokeless tobacco: Never   Vaping Use    Vaping status: Never Used   Substance Use Topics    Alcohol use: Yes     Comment: DAILY- BEER/LIQUOR    Drug use: Yes     Types: Marijuana     " Comment: \"SOME\"     Family History   Problem Relation Age of Onset    No Known Problems Mother     No Known Problems Father     Malig Hyperthermia Neg Hx      There are no preventive care reminders to display for this patient.     Immunization History   Administered Date(s) Administered    Td (TDVAX) 09/24/2001        Objective     Vitals:    07/31/24 0920   BP: 129/88   Pulse: 69   Temp: 98.5 °F (36.9 °C)   TempSrc: Oral   SpO2: 100%   Weight: 67.7 kg (149 lb 3.2 oz)   Height: 165.1 cm (65\")     Body mass index is 24.83 kg/m².     BMI is within normal parameters. No other follow-up for BMI required.           No results found.    Physical Exam  Vitals reviewed.   Constitutional:       General: He is not in acute distress.     Appearance: Normal appearance.   HENT:      Head: Normocephalic and atraumatic.      Right Ear: Hearing, tympanic membrane and ear canal normal.      Left Ear: Hearing, tympanic membrane and ear canal normal.      Mouth/Throat:      Mouth: Mucous membranes are moist.   Eyes:      Extraocular Movements: Extraocular movements intact.      Pupils: Pupils are equal, round, and reactive to light.   Cardiovascular:      Rate and Rhythm: Normal rate and regular rhythm.   Pulmonary:      Effort: Pulmonary effort is normal. No respiratory distress.      Breath sounds: Normal breath sounds.   Abdominal:      General: Bowel sounds are normal.      Palpations: Abdomen is soft.      Tenderness: There is no abdominal tenderness.   Musculoskeletal:         General: Normal range of motion.      Cervical back: Normal range of motion and neck supple.   Skin:     General: Skin is warm and dry.      Comments: Cold sore to right side bottom lip   Neurological:      Mental Status: He is alert and oriented to person, place, and time.   Psychiatric:         Mood and Affect: Mood normal.           Result Review :                               Assessment and Plan      Assessment & Plan  Annual physical " exam  Appropriate screenings and vaccinations were reviewed with the pt and offered as indicated.      Anxiety and depression    Will start daily low dose Lexapro for depression and anxiety. Let me know of concerns.     Orders Placed This Encounter   Procedures    Comprehensive metabolic panel     New Medications Ordered This Visit   Medications    escitalopram (LEXAPRO) 5 MG tablet     Sig: Take 1 tablet by mouth Daily.     Dispense:  90 tablet     Refill:  0                 Follow Up     Return in about 6 weeks (around 9/11/2024) for Recheck.

## 2024-08-01 LAB
ALBUMIN SERPL-MCNC: 4.4 G/DL (ref 4.1–5.1)
ALP SERPL-CCNC: 74 IU/L (ref 44–121)
ALT SERPL-CCNC: 31 IU/L (ref 0–44)
AMBIG ABBREV CMP14 DEFAULT: NORMAL
AST SERPL-CCNC: 23 IU/L (ref 0–40)
BILIRUB SERPL-MCNC: 0.3 MG/DL (ref 0–1.2)
BUN SERPL-MCNC: 15 MG/DL (ref 6–24)
BUN/CREAT SERPL: 15 (ref 9–20)
CALCIUM SERPL-MCNC: 9.4 MG/DL (ref 8.7–10.2)
CHLORIDE SERPL-SCNC: 103 MMOL/L (ref 96–106)
CO2 SERPL-SCNC: 22 MMOL/L (ref 20–29)
CREAT SERPL-MCNC: 1.01 MG/DL (ref 0.76–1.27)
EGFRCR SERPLBLD CKD-EPI 2021: 96 ML/MIN/1.73
GLOBULIN SER CALC-MCNC: 3 G/DL (ref 1.5–4.5)
GLUCOSE SERPL-MCNC: 100 MG/DL (ref 70–99)
POTASSIUM SERPL-SCNC: 4.5 MMOL/L (ref 3.5–5.2)
PROT SERPL-MCNC: 7.4 G/DL (ref 6–8.5)
SODIUM SERPL-SCNC: 139 MMOL/L (ref 134–144)

## 2024-09-04 ENCOUNTER — TELEPHONE (OUTPATIENT)
Dept: FAMILY MEDICINE CLINIC | Age: 39
End: 2024-09-04
Payer: MEDICAID

## 2024-09-09 ENCOUNTER — LAB (OUTPATIENT)
Dept: LAB | Facility: HOSPITAL | Age: 39
End: 2024-09-09
Payer: MEDICAID

## 2024-09-09 PROCEDURE — 84443 ASSAY THYROID STIM HORMONE: CPT | Performed by: NURSE PRACTITIONER

## 2024-09-09 PROCEDURE — 80053 COMPREHEN METABOLIC PANEL: CPT | Performed by: NURSE PRACTITIONER

## 2024-09-09 PROCEDURE — 84439 ASSAY OF FREE THYROXINE: CPT | Performed by: NURSE PRACTITIONER

## 2024-09-11 ENCOUNTER — OFFICE VISIT (OUTPATIENT)
Dept: FAMILY MEDICINE CLINIC | Age: 39
End: 2024-09-11
Payer: MEDICAID

## 2024-09-11 VITALS
OXYGEN SATURATION: 97 % | HEART RATE: 72 BPM | TEMPERATURE: 98.6 F | BODY MASS INDEX: 25.36 KG/M2 | WEIGHT: 152.2 LBS | HEIGHT: 65 IN | SYSTOLIC BLOOD PRESSURE: 127 MMHG | DIASTOLIC BLOOD PRESSURE: 96 MMHG

## 2024-09-11 DIAGNOSIS — S80.861A INSECT BITE OF RIGHT LOWER LEG, INITIAL ENCOUNTER: ICD-10-CM

## 2024-09-11 DIAGNOSIS — F32.A ANXIETY AND DEPRESSION: Primary | ICD-10-CM

## 2024-09-11 DIAGNOSIS — R79.89 ABNORMAL LIVER FUNCTION TEST: ICD-10-CM

## 2024-09-11 DIAGNOSIS — F41.9 ANXIETY AND DEPRESSION: Primary | ICD-10-CM
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
## 2024-09-11 DIAGNOSIS — L30.9 DERMATITIS: ICD-10-CM

## 2024-09-11 PROCEDURE — 1125F AMNT PAIN NOTED PAIN PRSNT: CPT | Performed by: NURSE PRACTITIONER

## 2024-09-11 PROCEDURE — 1160F RVW MEDS BY RX/DR IN RCRD: CPT | Performed by: NURSE PRACTITIONER

## 2024-09-11 PROCEDURE — 99214 OFFICE O/P EST MOD 30 MIN: CPT | Performed by: NURSE PRACTITIONER

## 2024-09-11 PROCEDURE — 1159F MED LIST DOCD IN RCRD: CPT | Performed by: NURSE PRACTITIONER

## 2024-09-11 RX ORDER — HYDROCORTISONE 2.5 %
1 CREAM (GRAM) TOPICAL 2 TIMES DAILY
Qty: 453.6 G | Refills: 0 | Status: SHIPPED | OUTPATIENT
Start: 2024-09-11

## 2024-09-11 RX ORDER — ESCITALOPRAM OXALATE 10 MG/1
10 TABLET ORAL DAILY
Qty: 90 TABLET | Refills: 0 | Status: SHIPPED | OUTPATIENT
Start: 2024-09-11

## 2024-09-11 RX ORDER — TRIAMCINOLONE ACETONIDE 1 MG/G
1 OINTMENT TOPICAL 2 TIMES DAILY
Qty: 80 G | Refills: 0 | Status: SHIPPED | OUTPATIENT
Start: 2024-09-11

## 2024-09-11 NOTE — PROGRESS NOTES
Chief Complaint  Juan M Lynch presents to [facility] for Anxiety, Depression, and Insect Bite (On Lt foot )    Subjective          History of Present Illness    Mendoza is here today to follow up on anxiety and stress. He was started on Lexapro at 5 mg daily at visit on 7/31/24. Previously had tried hydroxyzine, Zoloft, and wellbutrin but were either ineffective or did not tolerate. Denies side effects from Lexapro. Tolerating well. He feels that this is helping him not feel as frustrated. He feels that his negative thoughts are not as concentrated.   Also notes right foot with insect bite. Turned into blister. Applying triple antibiotic ointment. Has been itching. Applying goldbond ointment. Has had noseeums on him as well. Has applied hydrocortisone cream.   He had abnormal liver labs on labs 2 days ago. He has some URI symptoms recently. No abdominal pain or GI symptoms. He has been taking Ibuprofen recently.     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 90 tablet 0    fluticasone (FLONASE) 50 MCG/ACT nasal spray 2 sprays by Each Nare route Daily. (Patient taking differently: 2 sprays into the nostril(s) as directed by provider As Needed.) 16 mL 1    hydrocortisone 2.5 % cream Apply 1 Application topically to the appropriate area as directed 2 (Two) Times a Day. 453.6 g 0    Unithroid 50 MCG tablet Take 1 tablet by mouth Every Morning. Take on an empty stomach with just water 30 min to an hour before any other medications, food or drink  "30 tablet 0    triamcinolone (KENALOG) 0.1 % ointment Apply 1 Application topically to the appropriate area as directed 2 (Two) Times a Day. 80 g 0     No current facility-administered medications for this visit.     Past Surgical History:   Procedure Laterality Date    ELBOW PROCEDURE      FIRST RIB RESECTION Right 8/8/2022    Procedure: BRONCHOSCOPY, RIGHT THORACOSCOPY VIDEO ASSISTED WITH RESECTION OF RIGHT FIRST RIB, INTERCOSTAL NERVE BLOCK;  Surgeon: Manan Gasca III, MD;  Location: [facility] OR;  Service: Thoracic;  Laterality: Right;    NASAL ENDOSCOPY N/A 5/16/2022    Procedure: NASAL ENDOSCOPY;  Surgeon: Reji Beebe MD;  [facility];  Service: ENT;  Laterality: N/A;    THYROIDECTOMY Right 5/16/2022    Procedure: Right THYROIDECTOMY, excision of nasal polyps;  Surgeon: Reji Beebe MD;  [facility]: [facility];  Service: ENT;  Laterality: Right;    VASECTOMY        Social History     Tobacco Use    Smoking status: Never     Passive exposure: Past    Smokeless tobacco: Never   Vaping Use    Vaping status: Never Used   Substance Use Topics    Alcohol use: Yes     Comment: DAILY- BEER/LIQUOR    Drug use: Yes     Types: Marijuana     Comment: \"SOME\"     Family History   Problem Relation Age of Onset    No Known Problems Mother     No Known Problems Father     Malig Hyperthermia Neg Hx      Health Maintenance Due   Topic Date Due    BMI FOLLOWUP  07/28/2024    INFLUENZA VACCINE  08/01/2024      Immunization History   Administered Date(s) Administered    Td (TDVAX) 09/24/2001        Objective     Vitals:    09/11/24 1139 09/11/24 1144 09/11/24 1218   BP: (!) 126/106 133/95 127/96   BP Location: Left arm Right arm Right arm   Patient Position: Sitting Sitting Sitting   Cuff Size: Adult Adult Adult   Pulse: 74  72   Temp: 98.6 °F (37 °C)     TempSrc: Oral     SpO2: 97%     Weight: 69 kg (152 lb 3.2 oz)     Height: 165.1 cm (65\")       Body mass index is 25.33 kg/m².     BMI is >= 25 " and <30. (Overweight) The following options were offered after discussion;: none (medical contraindication)            No results found.    Physical Exam  Vitals reviewed.   Constitutional:       General: He is not in acute distress.     Appearance: Normal appearance. He is well-developed.   HENT:      Head: Normocephalic and atraumatic.   Cardiovascular:      Rate and Rhythm: Normal rate and regular rhythm.   Pulmonary:      Effort: Pulmonary effort is normal.      Breath sounds: Normal breath sounds.   Skin:     Comments: Healing blister to right foot. Mildly erythematous bites to foot.    Neurological:      Mental Status: He is alert and oriented to person, place, and time.   Psychiatric:         Mood and Affect: Mood and affect normal.           Result Review :                               Assessment and Plan      Assessment & Plan  Anxiety and depression    Will increase Lexapro dose to 10 mg daily. Let me know of concerns.   Abnormal liver function test  Discussed that this may be due to recent illness, use of Ibuprofen. Will avoid alcohol and OTC medications. Will repeat labs in a few weeks.   Insect bite of right lower leg, initial encounter  Rx triamcinolone ointment. Do not apply to face.   Dermatitis  Refilling hydrocortisone ointment for him to use on his face.     Orders Placed This Encounter   Procedures    Comprehensive Metabolic Panel     New Medications Ordered This Visit   Medications    triamcinolone (KENALOG) 0.1 % ointment     Sig: Apply 1 Application topically to the appropriate area as directed 2 (Two) Times a Day.     Dispense:  80 g     Refill:  0    escitalopram (LEXAPRO) 10 MG tablet     Sig: Take 1 tablet by mouth Daily.     Dispense:  90 tablet     Refill:  0    hydrocortisone 2.5 % cream     Sig: Apply 1 Application topically to the appropriate area as directed 2 (Two) Times a Day.     Dispense:  453.6 g     Refill:  0                 Follow Up     Return in about 8 weeks (around  11/6/2024) for Recheck.

## 2024-10-18 DIAGNOSIS — L30.9 DERMATITIS: ICD-10-CM

## 2024-10-18 DIAGNOSIS — J31.0 RHINITIS, UNSPECIFIED TYPE: ICD-10-CM

## 2024-10-18 RX ORDER — HYDROCORTISONE 2.5 %
1 CREAM (GRAM) TOPICAL 2 TIMES DAILY
Qty: 453.6 G | Refills: 0 | Status: SHIPPED | OUTPATIENT
Start: 2024-10-18

## 2024-10-18 RX ORDER — FLUTICASONE PROPIONATE 50 UG/1
2 SPRAY, METERED NASAL DAILY
Qty: 16 ML | Refills: 1 | Status: SHIPPED | OUTPATIENT
Start: 2024-10-18

## 2024-11-04 DIAGNOSIS — F32.A ANXIETY AND DEPRESSION: ICD-10-CM

## 2024-11-04 DIAGNOSIS — F41.9 ANXIETY AND DEPRESSION: ICD-10-CM

## 2024-11-04 RX ORDER — ESCITALOPRAM OXALATE 5 MG/1
5 TABLET ORAL DAILY
Qty: 90 TABLET | Refills: 0 | OUTPATIENT
Start: 2024-11-04

## 2024-11-07 DIAGNOSIS — E89.0 POSTOPERATIVE HYPOTHYROIDISM: ICD-10-CM

## 2024-11-07 DIAGNOSIS — J31.0 RHINITIS, UNSPECIFIED TYPE: ICD-10-CM

## 2024-11-07 RX ORDER — FLUTICASONE PROPIONATE 50 MCG
2 SPRAY, SUSPENSION (ML) NASAL DAILY
Qty: 16 ML | Refills: 1 | Status: SHIPPED | OUTPATIENT
Start: 2024-11-07

## 2024-11-07 RX ORDER — LEVOTHYROXINE SODIUM 50 UG/1
50 TABLET ORAL
Qty: 90 TABLET | Refills: 0 | COMMUNITY
Start: 2024-11-07

## 2024-11-08 ENCOUNTER — TELEPHONE (OUTPATIENT)
Dept: FAMILY MEDICINE CLINIC | Age: 39
End: 2024-11-08

## 2024-11-14 ENCOUNTER — LAB (OUTPATIENT)
Dept: LAB | Facility: HOSPITAL | Age: 39
End: 2024-11-14
Payer: COMMERCIAL

## 2024-11-14 DIAGNOSIS — R79.89 ABNORMAL LIVER FUNCTION TEST: ICD-10-CM

## 2024-11-14 LAB
ALBUMIN SERPL-MCNC: 4.2 G/DL (ref 3.5–5.2)
ALBUMIN/GLOB SERPL: 1.4 G/DL
ALP SERPL-CCNC: 74 U/L (ref 39–117)
ALT SERPL W P-5'-P-CCNC: 52 U/L (ref 1–41)
ANION GAP SERPL CALCULATED.3IONS-SCNC: 9.6 MMOL/L (ref 5–15)
AST SERPL-CCNC: 32 U/L (ref 1–40)
BILIRUB SERPL-MCNC: 0.3 MG/DL (ref 0–1.2)
BUN SERPL-MCNC: 17 MG/DL (ref 6–20)
BUN/CREAT SERPL: 13.7 (ref 7–25)
CALCIUM SPEC-SCNC: 9.1 MG/DL (ref 8.6–10.5)
CHLORIDE SERPL-SCNC: 105 MMOL/L (ref 98–107)
CO2 SERPL-SCNC: 24.4 MMOL/L (ref 22–29)
CREAT SERPL-MCNC: 1.24 MG/DL (ref 0.76–1.27)
EGFRCR SERPLBLD CKD-EPI 2021: 74.9 ML/MIN/1.73
GLOBULIN UR ELPH-MCNC: 3 GM/DL
GLUCOSE SERPL-MCNC: 104 MG/DL (ref 65–99)
POTASSIUM SERPL-SCNC: 4 MMOL/L (ref 3.5–5.2)
PROT SERPL-MCNC: 7.2 G/DL (ref 6–8.5)
SODIUM SERPL-SCNC: 139 MMOL/L (ref 136–145)

## 2024-11-14 PROCEDURE — 36415 COLL VENOUS BLD VENIPUNCTURE: CPT

## 2024-11-14 PROCEDURE — 80053 COMPREHEN METABOLIC PANEL: CPT

## 2024-11-21 ENCOUNTER — TELEMEDICINE (OUTPATIENT)
Dept: FAMILY MEDICINE CLINIC | Age: 39
End: 2024-11-21
Payer: COMMERCIAL

## 2024-11-21 DIAGNOSIS — F41.9 ANXIETY AND DEPRESSION: ICD-10-CM

## 2024-11-21 DIAGNOSIS — F32.A ANXIETY AND DEPRESSION: ICD-10-CM

## 2024-11-21 PROCEDURE — 99213 OFFICE O/P EST LOW 20 MIN: CPT | Performed by: NURSE PRACTITIONER

## 2024-11-21 RX ORDER — ESCITALOPRAM OXALATE 10 MG/1
10 TABLET ORAL DAILY
Qty: 90 TABLET | Refills: 1 | Status: SHIPPED | OUTPATIENT
Start: 2024-11-21

## 2024-11-21 NOTE — PROGRESS NOTES
Chief Complaint  Juan M Lynch presents to [facility] for Anxiety (Video visit: [phone])    Subjective          History of Present Illness     Mode of Visit: Video  Location of patient: Home  [address] [address]  [place] [facility]  [address]  [address]  [facility] [address]  Dept: [phone]  Dept Fax: [phone]  Loc: [phone]  Loc Fax: [phone]  You have chosen to receive care through a telehealth visit.  Does the patient consent to use a video/audio connection for your medical care today? YES  The visit included audio and video interaction. No technical issues occurred during this visit.     Mendoza is following up on anxiety and stress. Current medication is Lexapro 10 mg daily. Dose was increased at visit on 9/11/24. Previously had tried hydroxyzine, Zoloft, and wellbutrin but were either ineffective or did not tolerate. He feels that the Lexapro is helping and wishes to continue. 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 90 tablet 1    fluticasone (FLONASE) 50 MCG/ACT nasal spray 2 sprays by Each Nare route Daily. 16 mL 1    hydrocortisone 2.5 % cream Apply 1 Application topically to the appropriate area as directed 2 (Two) Times a Day. 453.6 g 0    triamcinolone (KENALOG) 0.1 % ointment Apply 1 Application topically to the appropriate area as  "directed 2 (Two) Times a Day. 80 g 0    Unithroid 50 MCG tablet Take 1 tablet by mouth Every Morning. Take on an empty stomach with just water 30 min to an hour before any other medications, food or drink 30 tablet 0     No current facility-administered medications for this visit.     Past Surgical History:   Procedure Laterality Date    ELBOW PROCEDURE      FIRST RIB RESECTION Right 8/8/2022    Procedure: BRONCHOSCOPY, RIGHT THORACOSCOPY VIDEO ASSISTED WITH RESECTION OF RIGHT FIRST RIB, INTERCOSTAL NERVE BLOCK;  Surgeon: Manan Gasca III, MD;  Location: [facility] OR;  Service: Thoracic;  Laterality: Right;    NASAL ENDOSCOPY N/A 5/16/2022    Procedure: NASAL ENDOSCOPY;  Surgeon: Reji Beebe MD;  [facility]: [facility];  Service: ENT;  Laterality: N/A;    THYROIDECTOMY Right 5/16/2022    Procedure: Right THYROIDECTOMY, excision of nasal polyps;  Surgeon: Reji Beebe MD;  Location: [facility];  Service: ENT;  Laterality: Right;    VASECTOMY        Social History     Tobacco Use    Smoking status: Never     Passive exposure: Past    Smokeless tobacco: Never   Vaping Use    Vaping status: Never Used   Substance Use Topics    Alcohol use: Yes     Comment: DAILY- BEER/LIQUOR    Drug use: Yes     Types: Marijuana     Comment: \"SOME\"     Family History   Problem Relation Age of Onset    No Known Problems Mother     No Known Problems Father     Malig Hyperthermia Neg Hx      There are no preventive care reminders to display for this patient.   Immunization History   Administered Date(s) Administered    Td (TDVAX) 09/24/2001        Objective     There were no vitals filed for this visit.  There is no height or weight on file to calculate BMI.                No results found.    Physical Exam  Vitals reviewed.   Constitutional:       General: He is not in acute distress.     Appearance: Normal appearance. He is well-developed.   HENT:      Head: Normocephalic and atraumatic.   Pulmonary:      " Effort: Pulmonary effort is normal.   Neurological:      Mental Status: He is alert and oriented to person, place, and time.   Psychiatric:         Mood and Affect: Mood and affect normal.           Result Review :                               Assessment and Plan      Assessment & Plan  Anxiety and depression    Medical condition is stable.  Continue same therapy.  Will recheck at next regular appointment      Orders:    escitalopram (LEXAPRO) 10 MG tablet; Take 1 tablet by mouth Daily.              Follow Up     Return for Next scheduled follow up, Or sooner as needed.

## 2024-12-08 DIAGNOSIS — F32.A ANXIETY AND DEPRESSION: ICD-10-CM

## 2024-12-08 DIAGNOSIS — F41.9 ANXIETY AND DEPRESSION: ICD-10-CM

## 2024-12-09 RX ORDER — ESCITALOPRAM OXALATE 10 MG/1
10 TABLET ORAL DAILY
Qty: 90 TABLET | Refills: 1 | OUTPATIENT
Start: 2024-12-09

## 2025-02-17 ENCOUNTER — TELEPHONE (OUTPATIENT)
Dept: FAMILY MEDICINE CLINIC | Age: 40
End: 2025-02-17
Payer: COMMERCIAL

## 2025-02-17 DIAGNOSIS — E89.0 POSTOPERATIVE HYPOTHYROIDISM: ICD-10-CM

## 2025-02-17 RX ORDER — LEVOTHYROXINE SODIUM 50 UG/1
50 TABLET ORAL
Qty: 90 TABLET | Refills: 0 | Status: CANCELLED | COMMUNITY
Start: 2025-02-17

## 2025-02-17 NOTE — TELEPHONE ENCOUNTER
"  Caller: Juan M Lynch W \"Mendoza\"    Relationship: Self    Best call back number: [phone]    What is the best time to reach you: ANYTIME    Who are you requesting to speak with (clinical staff, provider,  specific staff member): NURSE       What was the call regarding: PATIENT IS CALLING REGARDING HIS THYROID MEDICTION HE IS NEEDING IT TO BE REFILLED FROM [facility] AND NEEDS SOMEONE TO CALL AND VERIFY IT.  HE IS ALSO REQUESTING A 90 DAY REFILL SO THAT HE DOES NOT HAVE TO DO THIS EVERY MONTH.     "

## 2025-02-17 NOTE — TELEPHONE ENCOUNTER
Pt is wanting you to take over prescribing his thyroid medication, previously prescribed by Carly carmen[facility], states he doesn't see them anymore and wants you to take over, please advise.

## 2025-02-20 ENCOUNTER — TELEMEDICINE (OUTPATIENT)
Dept: FAMILY MEDICINE CLINIC | Age: 40
End: 2025-02-20
Payer: COMMERCIAL

## 2025-02-20 DIAGNOSIS — F41.9 ANXIETY: ICD-10-CM

## 2025-02-20 DIAGNOSIS — Z13.6 SCREENING FOR CARDIOVASCULAR CONDITION: ICD-10-CM

## 2025-02-20 DIAGNOSIS — E89.0 POSTOPERATIVE HYPOTHYROIDISM: ICD-10-CM

## 2025-02-20 PROBLEM — G54.0 TOS (THORACIC OUTLET SYNDROME): Status: RESOLVED | Noted: 2022-06-21 | Resolved: 2025-02-20

## 2025-02-20 PROCEDURE — 99214 OFFICE O/P EST MOD 30 MIN: CPT | Performed by: NURSE PRACTITIONER

## 2025-02-20 RX ORDER — LEVOTHYROXINE SODIUM 50 UG/1
50 TABLET ORAL
Qty: 90 TABLET | Refills: 1 | COMMUNITY
Start: 2025-02-20

## 2025-02-20 NOTE — ASSESSMENT & PLAN NOTE
Refilling thyroid medication. Advised due for repeat labs. Orders placed.  Orders:    Unithroid 50 MCG tablet; Take 1 tablet by mouth Every Morning. Take on an empty stomach with just water 30 min to an hour before any other medications, food or drink    TSH Rfx On Abnormal To Free T4; Future

## 2025-02-20 NOTE — PROGRESS NOTES
Chief Complaint  Juan M Lynch presents to [facility] for Hypothyroidism ([phone])    Subjective          History of Present Illness    Mode of Visit: Video  Location of patient: Work   Location of provider: [facility]  [address] [facility]  [address]  [facility] [address]  Dept: [phone]  Dept Fax: [phone]  Loc: [phone]  Loc Fax: [phone]  You have chosen to receive care through a telehealth visit.  Does the patient consent to use a video/audio connection for your medical care today? YES  The visit included audio and video interaction. No technical issues occurred during this visit.     Mendoza is on unithroid for postoperative hypothyroidism. Previously unable to tolerate levothyroxine. This was started by endocrinology. Reports that he has not been able to follow up due to work schedule. Last TSH mildly elevated at 5.69. Would like refill sent in .   He was also previously on Lexapro for anxiety and stress. Previously had tried hydroxyzine, Zoloft, and wellbutrin but were either ineffective or did not tolerate. He has actually stopped taking the Lexapro and feels that he is doing well without medication.     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 16 mL 1    hydrocortisone 2.5 % cream Apply 1 Application topically to the appropriate area as directed 2 (Two) Times a Day. 453.6 g 0    triamcinolone (KENALOG) 0.1 % ointment Apply 1 Application topically  "to the appropriate area as directed 2 (Two) Times a Day. 80 g 0    Unithroid 50 MCG tablet Take 1 tablet by mouth Every Morning. Take on an empty stomach with just water 30 min to an hour before any other medications, food or drink 90 tablet 1     No current facility-administered medications for this visit.     Past Surgical History:   Procedure Laterality Date    ELBOW PROCEDURE      FIRST RIB RESECTION Right 8/8/2022    Procedure: BRONCHOSCOPY, RIGHT THORACOSCOPY VIDEO ASSISTED WITH RESECTION OF RIGHT FIRST RIB, INTERCOSTAL NERVE BLOCK;  Surgeon: Manan Gasca III, MD;  Location: [facility] OR;  Service: Thoracic;  Laterality: Right;    NASAL ENDOSCOPY N/A 5/16/2022    Procedure: NASAL ENDOSCOPY;  Surgeon: Reji Beebe MD;  [facility];  Service: ENT;  Laterality: N/A;    THYROIDECTOMY Right 5/16/2022    Procedure: Right THYROIDECTOMY, excision of nasal polyps;  Surgeon: Reji Beebe MD;  Location: [facility];  Service: ENT;  Laterality: Right;    VASECTOMY        Social History     Tobacco Use    Smoking status: Never     Passive exposure: Past    Smokeless tobacco: Never   Vaping Use    Vaping status: Never Used   Substance Use Topics    Alcohol use: Yes     Comment: DAILY- BEER/LIQUOR    Drug use: Yes     Types: Marijuana     Comment: \"SOME\"     Family History   Problem Relation Age of Onset    No Known Problems Mother     No Known Problems Father     Malig Hyperthermia Neg Hx      There are no preventive care reminders to display for this patient.   Immunization History   Administered Date(s) Administered    Td (TDVAX) 09/24/2001        Objective     There were no vitals filed for this visit.  There is no height or weight on file to calculate BMI.                No results found.    Physical Exam  Vitals reviewed.   Constitutional:       General: He is not in acute distress.     Appearance: Normal appearance. He is well-developed.   HENT:      Head: Normocephalic and " PAST MEDICAL HISTORY:  Diabetes     Hypertension     SVT (supraventricular tachycardia) had ablation 2008     atraumatic.   Pulmonary:      Effort: Pulmonary effort is normal.   Neurological:      Mental Status: He is alert and oriented to person, place, and time.   Psychiatric:         Mood and Affect: Mood and affect normal.           Result Review :                               Assessment and Plan      Assessment & Plan  Postoperative hypothyroidism  Refilling thyroid medication. Advised due for repeat labs. Orders placed.  Orders:    Unithroid 50 MCG tablet; Take 1 tablet by mouth Every Morning. Take on an empty stomach with just water 30 min to an hour before any other medications, food or drink    TSH Rfx On Abnormal To Free T4; Future    Screening for cardiovascular condition  Fasting lab orders placed  Orders:    Comprehensive metabolic panel; Future    Lipid panel; Future    Anxiety  Reports symptoms stable without medication. Will recheck at next regular appointment                   Follow Up     Return in about 6 months (around 8/20/2025) for Annual physical.

## 2025-03-06 ENCOUNTER — TELEPHONE (OUTPATIENT)
Dept: FAMILY MEDICINE CLINIC | Age: 40
End: 2025-03-06
Payer: COMMERCIAL

## 2025-03-06 DIAGNOSIS — E89.0 POSTOPERATIVE HYPOTHYROIDISM: ICD-10-CM

## 2025-03-06 RX ORDER — LEVOTHYROXINE SODIUM 50 UG/1
50 TABLET ORAL
Qty: 90 TABLET | Refills: 1 | COMMUNITY
Start: 2025-03-06 | End: 2025-03-06 | Stop reason: SDUPTHER

## 2025-03-06 RX ORDER — LEVOTHYROXINE SODIUM 50 UG/1
50 TABLET ORAL
Qty: 90 TABLET | Refills: 1 | Status: SHIPPED | OUTPATIENT
Start: 2025-03-06

## 2025-03-06 NOTE — TELEPHONE ENCOUNTER
"    Caller: Juan M Lynch W \"Mendoza\"    Relationship: Self    Best call back number: [phone]     Requested Prescriptions:   Requested Prescriptions     Pending Prescriptions Disp Refills    Unithroid 50 MCG tablet 90 tablet 1     Sig: Take 1 tablet by mouth Every Morning. Take on an empty stomach with just water 30 min to an hour before any other medications, food or drink        Pharmacy where request should be sent: [facility] - [address] [phone] [facility] [phone]      Last office visit with prescribing clinician: 9/11/2024   Last telemedicine visit with prescribing clinician: 2/20/2025   Next office visit with prescribing clinician: 8/22/2025     Additional details provided by patient: PATIENT CALLED IN STATING [facility] NEEDS A PRIOR AUTHORIZATION FROM THE PRESCRIBER KADEN REA. PLEASE ADVISE, PATIENT IS OUT OF THIS MEDICATION COMPLETELY.     Does the patient have less than a 3 day supply:  [x] Yes  [] No      Brandee Spear Rep   03/06/25 12:56 EST       "

## 2025-03-10 NOTE — TELEPHONE ENCOUNTER
PA response : This medication or product is on your plan's list of covered drugs. Prior authorization is not required at this time.   Patient notified. Jose

## 2025-03-12 ENCOUNTER — TELEPHONE (OUTPATIENT)
Dept: FAMILY MEDICINE CLINIC | Age: 40
End: 2025-03-12
Payer: COMMERCIAL

## 2025-03-31 ENCOUNTER — LAB (OUTPATIENT)
Dept: LAB | Facility: HOSPITAL | Age: 40
End: 2025-03-31
Payer: COMMERCIAL

## 2025-03-31 DIAGNOSIS — Z13.6 SCREENING FOR CARDIOVASCULAR CONDITION: ICD-10-CM

## 2025-03-31 DIAGNOSIS — E89.0 POSTOPERATIVE HYPOTHYROIDISM: ICD-10-CM

## 2025-03-31 LAB
ALBUMIN SERPL-MCNC: 3.9 G/DL (ref 3.5–5.2)
ALBUMIN/GLOB SERPL: 1.3 G/DL
ALP SERPL-CCNC: 75 U/L (ref 39–117)
ALT SERPL W P-5'-P-CCNC: 65 U/L (ref 1–41)
ANION GAP SERPL CALCULATED.3IONS-SCNC: 9.9 MMOL/L (ref 5–15)
AST SERPL-CCNC: 37 U/L (ref 1–40)
BILIRUB SERPL-MCNC: <0.2 MG/DL (ref 0–1.2)
BUN SERPL-MCNC: 17 MG/DL (ref 6–20)
BUN/CREAT SERPL: 14.9 (ref 7–25)
CALCIUM SPEC-SCNC: 9 MG/DL (ref 8.6–10.5)
CHLORIDE SERPL-SCNC: 107 MMOL/L (ref 98–107)
CHOLEST SERPL-MCNC: 208 MG/DL (ref 0–200)
CO2 SERPL-SCNC: 24.1 MMOL/L (ref 22–29)
CREAT SERPL-MCNC: 1.14 MG/DL (ref 0.76–1.27)
EGFRCR SERPLBLD CKD-EPI 2021: 82.9 ML/MIN/1.73
GLOBULIN UR ELPH-MCNC: 3 GM/DL
GLUCOSE SERPL-MCNC: 95 MG/DL (ref 65–99)
HDLC SERPL-MCNC: 47 MG/DL (ref 40–60)
LDLC SERPL CALC-MCNC: 150 MG/DL (ref 0–100)
LDLC/HDLC SERPL: 3.16 {RATIO}
POTASSIUM SERPL-SCNC: 4.4 MMOL/L (ref 3.5–5.2)
PROT SERPL-MCNC: 6.9 G/DL (ref 6–8.5)
SODIUM SERPL-SCNC: 141 MMOL/L (ref 136–145)
TRIGL SERPL-MCNC: 63 MG/DL (ref 0–150)
TSH SERPL DL<=0.05 MIU/L-ACNC: 1.56 UIU/ML (ref 0.27–4.2)
VLDLC SERPL-MCNC: 11 MG/DL (ref 5–40)

## 2025-03-31 PROCEDURE — 36415 COLL VENOUS BLD VENIPUNCTURE: CPT

## 2025-03-31 PROCEDURE — 80061 LIPID PANEL: CPT

## 2025-03-31 PROCEDURE — 84443 ASSAY THYROID STIM HORMONE: CPT

## 2025-03-31 PROCEDURE — 80053 COMPREHEN METABOLIC PANEL: CPT

## 2025-04-01 ENCOUNTER — RESULTS FOLLOW-UP (OUTPATIENT)
Dept: FAMILY MEDICINE CLINIC | Age: 40
End: 2025-04-01
Payer: COMMERCIAL

## 2025-04-01 NOTE — LETTER
Juan M Lynch  [address] [facility] [address] [facility] [address]    April 7, 2025     Dear Mendoza,     I am pleased to report that your recent labs looking at thyroid, blood sugar, electrolytes, and kidneys looked good. Liver stable. Cholesterol improved from last check. It is still higher than ideal but your cardiovascular risk score is low. Recommend heart healthy diet and regular physical activity. Plan to continue to monitor.  Please let me know if you have any questions.       Sincerely,  KADEN Hernandes   Resulted Orders   TSH Rfx On Abnormal To Free T4   Result Value Ref Range    TSH 1.560 0.270 - 4.200 uIU/mL   Comprehensive metabolic panel   Result Value Ref Range    Glucose 95 65 - 99 mg/dL    BUN 17 6 - 20 mg/dL    Creatinine 1.14 0.76 - 1.27 mg/dL    Sodium 141 136 - 145 mmol/L    Potassium 4.4 3.5 - 5.2 mmol/L    Chloride 107 98 - 107 mmol/L    CO2 24.1 22.0 - 29.0 mmol/L    Calcium 9.0 8.6 - 10.5 mg/dL    Total Protein 6.9 6.0 - 8.5 g/dL    Albumin 3.9 3.5 - 5.2 g/dL    ALT (SGPT) 65 (H) 1 - 41 U/L    AST (SGOT) 37 1 - 40 U/L    Alkaline Phosphatase 75 39 - 117 U/L    Total Bilirubin <0.2 0.0 - 1.2 mg/dL    Globulin 3.0 gm/dL    A/G Ratio 1.3 g/dL    BUN/Creatinine Ratio 14.9 7.0 - 25.0    Anion Gap 9.9 5.0 - 15.0 mmol/L    eGFR 82.9 >60.0 mL/min/1.73   Lipid panel   Result Value Ref Range    Total Cholesterol 208 (H) 0 - 200 mg/dL    Triglycerides 63 0 - 150 mg/dL    HDL Cholesterol 47 40 - 60 mg/dL    LDL Cholesterol  150 (H) 0 - 100 mg/dL    VLDL Cholesterol 11 5 - 40 mg/dL    LDL/HDL Ratio 3.16

## 2025-04-23 NOTE — PROGRESS NOTES
"Physical Therapy Initial Evaluation and Plan of Care      Patient: Juan M Lynch   : 1985  Diagnosis/ICD-10 Code:  TOS (thoracic outlet syndrome) [G54.0]  Referring practitioner: Manan Gasca III, MD  Date of Initial Visit: 2022  Today's Date: 2022  Patient seen for 1 sessions         Visit Diagnoses:    ICD-10-CM ICD-9-CM   1. TOS (thoracic outlet syndrome)  G54.0 353.0   2. Pain of right upper extremity  M79.601 729.5       Subjective Evaluation    History of Present Illness  Mechanism of injury: Pt was originally coming to therapy for thoracic outlet syndrome due to a work injury.  He came a few times, then workman's comp stopped approving visits.  He has continued to do his PT HEP.  He has been off work since 2022.  The pain has decreased some.  He continues to have pain in the R shoulder and if he sits for too long, his hands will go numb.  When he sleeps, the R hand will go numb. The R shoulder \"pops and grinds\" with overhead motion and out to the side.  Overhead motion in laying down for about 45 seconds will bring on numbness.     He has had another MRI done, then ultrasound, then biopsy.     He has recently found out that he has been diagnosed with thyroid cancer.  He has an appt coming up with ENT, but has not been scheduled with an oncologist yet.     He is going for a nerve block in the R shoulder on Friday.       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   Precautions and Work Restrictions: ACTIVE thyroid cancerPain  Current pain ratin  At worst pain ratin  Quality: radiating (constant, dull pain)  Relieving factors: ice  Aggravating factors: overhead activity, outstretched reach and sleeping    Social Support  Lives in: multiple-level home  Lives with: alone    Hand dominance: right    Treatments  Previous treatment: physical therapy  Patient Goals  Patient goals for therapy: decreased pain, increased motion, increased strength, return to work and " independence with ADLs/IADLs             Objective          Tenderness     Right Shoulder  Tenderness in the AC joint, clavicle, infraspinatus tendon, scapular spine and supraspinatus tendon.     Additional Tenderness Details  Upper trap tender    Neurological Testing     Additional Neurological Details  Radicular symptoms down the RUE.  More noted when he is sitting, holding his phone.  He sleeps on his stomach, will notice an increase in tingling.    He has periods where the numbness will come on suddenly.  He was cooking, holding a pot, numbness in his R hand, and he completely dropped the whole pot of food on the floor.     Active Range of Motion     Right Shoulder   Flexion: 140 degrees with pain  Abduction: 95 degrees with pain  External rotation 0°: 46 degrees with pain  Internal rotation 0°: 35 degrees with pain    Strength/Myotome Testing     Right Shoulder     Planes of Motion   Flexion: 3+   Extension: 3+   Abduction: 3+   External rotation at 0°: 3+   Internal rotation at 0°: 3+           Assessment & Plan     Assessment  Impairments: abnormal muscle firing, abnormal or restricted ROM, activity intolerance, impaired physical strength and pain with function  Functional Limitations: carrying objects, lifting, sleeping, pulling, pushing, uncomfortable because of pain, reaching behind back, reaching overhead and unable to perform repetitive tasks  Assessment details: Pt presents with limitations, noted below, that impede his ability to perform ADLs, work duties, perform hobbies, housework duties, and lifting anything heavy, opening jars, having radicular symptoms. The skills of a therapist will be required to safely and effectively implement the following treatment plan to restore maximal level of function.        Goals  Plan Goals: SHOULDER  PROBLEMS:     1. The patient has limited ROM of the right shoulder.    LTG 1: 12 weeks:  The patient will demonstrate 160 degrees of right shoulder flexion, 150 degrees  of shoulder abduction, 70 degrees of shoulder external rotation, and 70 degrees of shoulder internal rotation to allow the patient to reach into upper kitchen cabinets and manipulate clothing behind the back with greater ease.    STATUS:  New   STG 1a: 4 weeks:  The patient will demonstrate 155 degrees of right shoulder flexion, 120 degrees of shoulder abduction, 50 degrees of shoulder external rotation, and 50 degrees of shoulder internal rotation.    STATUS:  New   TREATMENT: Manual therapy, therapeutic exercise, home exercise instruction, and modalities as needed to include: electrical stimulation, ultrasound, moist heat, and ice.    2. The patient has limited strength of the right shoulder.   LTG 2: 12 weeks:  The patient will demonstrate 4 /5 strength for right shoulder flexion, abduction, external rotation, and internal rotation in order to demonstrate improved shoulder stability.    STATUS:  New   STG 2a: 4 weeks:  The patient will demonstrate 3+ /5 strength for right shoulder flexion, abduction, external rotation, and internal rotation.    STATUS:  New   STG2b:  4 weeks:  The patient will be independent with home exercises.     STATUS:  New   TREATMENT: Manual therapy, therapeutic exercise, home exercise instruction, and modalities as needed to include: electrical stimulation, ultrasound, moist heat, and ice.     3. The patient complains of pain to the right shoulder.   LTG 3: 12 weeks:  The patient will report a pain rating of 1 /10 or better in order to improve sleep quality and tolerance to performance of activities of daily living.    STATUS:  New   STG 3a: 4 weeks:  The patient will report a pain rating of 3 /10 or better.     STATUS:  New   TREATMENT: Manual therapy, therapeutic exercise, home exercise instruction, and modalities as needed to include: electrical stimulation, ultrasound, moist heat, and ice.    4. Carrying, Moving, and Handling Objects Functional Limitation     LTG 4: 12 weeks:  The  patient will demonstrate 20 % limitation by achieving a score of on the QuickDASH.    STATUS:  New   STG 4a: 4 weeks:  The patient will demonstrate 35 % limitation by achieving a score of on the QuickDASH.      STATUS:  New   TREATMENT:  Manual therapy, therapeutic exercise, home exercise instruction, and modalities as needed to include: moist heat, electrical stimulation, and ultrasound.    4. The patient reports radicular symptoms in the right upper extremity.   LTG 4: 12 weeks:  The patient will report a decrease in radicular symptoms in the right upper extremity by 75%.    STATUS:  New   STG 4a: 4 weeks:  The patient will report a decrease in radicular symptoms in the right upper extremity by 50%.    STATUS:  New   TREATMENT: Manual therapy, therapeutic exercise, home exercise instruction, cervical traction, and modalities as needed to include: moist heat, electrical stimulation, and ultrasound.        Plan  Therapy options: will be seen for skilled therapy services  Planned modality interventions: cryotherapy, dry needling, thermotherapy (hydrocollator packs) and traction  Planned therapy interventions: manual therapy, flexibility, functional ROM exercises, home exercise program, therapeutic activities, stretching, strengthening, soft tissue mobilization, postural training, neuromuscular re-education, ADL retraining and body mechanics training  Frequency: 1x week  Duration in weeks: 12  Treatment plan discussed with: patient  Plan details: Create an HEP, decrease radicular symptoms, improve R shoulder pain, strength, ROM, postural awareness          History # of Personal Factors and/or Comorbidities: HIGH (3+)  Examination of Body System(s): # of elements: HIGH (4+)  Clinical Presentation: EVOLVING  Clinical Decision Making: MODERATE      Timed:  Manual Therapy:         mins  66294;  Therapeutic Exercise:         mins  36602;     Neuromuscular Herbie:        mins  29876;    Therapeutic Activity:          mins   41037;     Gait Training:           mins  90666;     Ultrasound:          mins  73975;    Canalith Repos           ___  mins  43612      Untimed:  Electrical Stimulation:         mins  03167 ( );  Mechanical Traction:         mins  42419;   Dry Needling:                    mins self pay  Low Eval:                           mins  34217;  Medium Eval:                39     mins  16295;   High Eval:                          mins  47161       Timed Treatment:      mins   Total Treatment:     39   mins    PT SIGNATURE: Gin Power PT, DPT  [place] License: [id number]  Electronically signed by Gin Power PT, 04/06/22, 2:58 PM EDT        Initial Certification    Certification Period: 4/6/2022 thru 7/4/2022  I certify that the therapy services are furnished while this patient is under my care.  The services outlined above are required by this patient, and will be reviewed every 90 days.     PHYSICIAN: Manan Gasca III, MD  NPI: [id number]            PHYSICIAN PRINT NAME: ______________________________________________      PHYSICIAN SIGNATURE: ______________________________________________         DATE:________________________________        Please sign and return via fax to [phone].  Thank you, [facility].         Detail Level: Zone

## 2025-04-28 ENCOUNTER — TELEPHONE (OUTPATIENT)
Dept: FAMILY MEDICINE CLINIC | Age: 40
End: 2025-04-28
Payer: COMMERCIAL

## 2025-05-01 ENCOUNTER — OFFICE VISIT (OUTPATIENT)
Dept: FAMILY MEDICINE CLINIC | Age: 40
End: 2025-05-01
Payer: COMMERCIAL

## 2025-05-01 VITALS
OXYGEN SATURATION: 96 % | TEMPERATURE: 98.1 F | DIASTOLIC BLOOD PRESSURE: 78 MMHG | WEIGHT: 152.8 LBS | HEIGHT: 65 IN | SYSTOLIC BLOOD PRESSURE: 123 MMHG | HEART RATE: 52 BPM | BODY MASS INDEX: 25.46 KG/M2

## 2025-05-01 DIAGNOSIS — G44.82 HEADACHE ASSOCIATED WITH SEXUAL ACTIVITY: Primary | ICD-10-CM

## 2025-05-01 PROCEDURE — 99214 OFFICE O/P EST MOD 30 MIN: CPT | Performed by: NURSE PRACTITIONER

## 2025-05-01 NOTE — PROGRESS NOTES
Chief Complaint  Juan M Lynch presents to [facility] for Headache    Subjective          History of Present Illness    Mendoza is here today with c/o headaches. These started about 2-3 weeks ago. This is happening when exerting himself at the gym and with orgasm with sexual activity. These occur on the crown of his head. Describes as sharp pain. Has tried to take Ibuprofen prior to exertional activity and that has not helped. Typically will go away when he stops activity. His grandfather had aneurysm so this has him concerned. Has been taking Alpha Male supplement for several months. Denies vision changes.   He is on unithroid for postoperative hypothyroidism. Previously unable to tolerate levothyroxine. This was originally started by endocrinology. Last TSH normal.     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 16 mL 1    hydrocortisone 2.5 % cream Apply 1 Application topically to the appropriate area as directed 2 (Two) Times a Day. 453.6 g 0    triamcinolone (KENALOG) 0.1 % ointment Apply 1 Application topically to the appropriate area as directed 2 (Two) Times a Day. 80 g 0     "Unithroid 50 MCG tablet Take 1 tablet by mouth Every Morning. Take on an empty stomach with just water 30 min to an hour before any other medications, food or drink 90 tablet 1     No current facility-administered medications for this visit.     Past Surgical History:   Procedure Laterality Date    ELBOW PROCEDURE      FIRST RIB RESECTION Right 8/8/2022    Procedure: BRONCHOSCOPY, RIGHT THORACOSCOPY VIDEO ASSISTED WITH RESECTION OF RIGHT FIRST RIB, INTERCOSTAL NERVE BLOCK;  Surgeon: Manan Gasca III, MD;  Location: [facility] OR;  Service: Thoracic;  Laterality: Right;    NASAL ENDOSCOPY N/A 5/16/2022    Procedure: NASAL ENDOSCOPY;  Surgeon: Reji Beebe MD;  Location: [facility] OR [facility];  Service: ENT;  Laterality: N/A;    THYROIDECTOMY Right 5/16/2022    Procedure: Right THYROIDECTOMY, excision of nasal polyps;  Surgeon: Reji Beebe MD;  Location: [facility] OR [facility];  Service: ENT;  Laterality: Right;    VASECTOMY        Social History     Tobacco Use    Smoking status: Never     Passive exposure: Past    Smokeless tobacco: Never   Vaping Use    Vaping status: Never Used   Substance Use Topics    Alcohol use: Yes     Comment: DAILY- BEER/LIQUOR    Drug use: Yes     Types: Marijuana     Comment: \"SOME\"     Family History   Problem Relation Age of Onset    No Known Problems Mother     No Known Problems Father     Malig Hyperthermia Neg Hx      There are no preventive care reminders to display for this patient.   Immunization History   Administered Date(s) Administered    Td (TDVAX) 09/24/2001        Objective     Vitals:    05/01/25 1308   BP: 123/78   Pulse: 52   Temp: 98.1 °F (36.7 °C)   TempSrc: Oral   SpO2: 96%   Weight: 69.3 kg (152 lb 12.8 oz)   Height: 165.1 cm (65\")     Body mass index is 25.43 kg/m².              No results found.    Physical Exam  Vitals reviewed.   Constitutional:       General: He is not in acute distress.     Appearance: Normal appearance. He is well-developed.   HENT:      " Head: Normocephalic and atraumatic.      Right Ear: Tympanic membrane and ear canal normal.      Left Ear: Tympanic membrane and ear canal normal.      Mouth/Throat:      Mouth: Mucous membranes are moist.      Comments: Uvula midline  Eyes:      Extraocular Movements: Extraocular movements intact.      Pupils: Pupils are equal, round, and reactive to light.   Cardiovascular:      Rate and Rhythm: Normal rate and regular rhythm.   Pulmonary:      Effort: Pulmonary effort is normal.      Breath sounds: Normal breath sounds.   Neurological:      General: No focal deficit present.      Mental Status: He is alert and oriented to person, place, and time.   Psychiatric:         Mood and Affect: Mood and affect normal.           Result Review :                               Assessment and Plan      Assessment & Plan  Headache associated with sexual activity    With headaches during sexual activity as well as exertional exercise, will proceed with imaging for further evaluation. Did discuss that potential side effects of Alpha Male include headache. Recommend that he try to stop supplement to see if symptoms improve when not taking.   Orders:    CT Angiogram Head With Contrast; Future              Follow Up     Return for Next scheduled follow up, Or sooner as needed.

## 2025-05-23 ENCOUNTER — TELEPHONE (OUTPATIENT)
Dept: FAMILY MEDICINE CLINIC | Age: 40
End: 2025-05-23
Payer: COMMERCIAL

## 2025-05-23 NOTE — TELEPHONE ENCOUNTER
Attempted to contact pt re overdue CT Angiogram ordered 5/1/25, no answer voicemail full, pt no showed 5/15/25 appointment

## 2025-07-16 DIAGNOSIS — E89.0 POSTOPERATIVE HYPOTHYROIDISM: ICD-10-CM

## 2025-07-17 RX ORDER — LEVOTHYROXINE SODIUM 50 UG/1
50 TABLET ORAL
Qty: 90 TABLET | Refills: 0 | Status: SHIPPED | OUTPATIENT
Start: 2025-07-17

## (undated) DEVICE — STCKNT IMPERV 9X36IN STRL

## (undated) DEVICE — SYR LUERLOK 20CC BX/50

## (undated) DEVICE — DISPOSABLE MONOPOLAR ENDOSCOPIC CORD 10 FT. (3M): Brand: KIRWAN

## (undated) DEVICE — KT ANTI FOG W/FLD AND SPNG

## (undated) DEVICE — 28 FR STRAIGHT – SOFT PVC CATHETER: Brand: PVC THORACIC CATHETERS

## (undated) DEVICE — 3M™ STERI-DRAPE™ INSTRUMENT POUCH 1018L: Brand: STERI-DRAPE™

## (undated) DEVICE — SLV SCD KN/LEN ADJ EXPRSS BLENDED MD 1P/U

## (undated) DEVICE — BASIC SINGLE BASIN-LF: Brand: MEDLINE INDUSTRIES, INC.

## (undated) DEVICE — EMG TUBE 8229707 NIM TRIVANTAGE 7.0MM ID: Brand: NIM TRIVANTAGE™

## (undated) DEVICE — PENCL ES MEGADINE EZ/CLEAN BUTN W/HOLSTR 10FT

## (undated) DEVICE — CODMAN® SURGICAL PATTIES 1/2" X 3" (1.27CM X 7.62CM): Brand: CODMAN®

## (undated) DEVICE — COVER,MAYO STAND,STERILE: Brand: MEDLINE

## (undated) DEVICE — EXTENSION SET, MALE LUER LOCK ADAPTER WITH RETRACTABLE COLLAR

## (undated) DEVICE — Device

## (undated) DEVICE — APPL DURAPREP IODOPHOR APL 26ML

## (undated) DEVICE — DUAL LUMEN STOMACH TUBE,ANTI-REFLUX VALVE: Brand: SALEM SUMP

## (undated) DEVICE — SUT MNCRYL 5/0 P3 18IN UD MCP493G

## (undated) DEVICE — GAUZE,SPONGE,4"X4",16PLY,STRL,LF,10/TRAY: Brand: MEDLINE

## (undated) DEVICE — PAD,EYE,1-5/8X2 5/8,STERILE,LF,1/PK: Brand: MEDLINE

## (undated) DEVICE — ANTIBACTERIAL UNDYED BRAIDED (POLYGLACTIN 910), SYNTHETIC ABSORBABLE SUTURE: Brand: COATED VICRYL

## (undated) DEVICE — ELECTRD BLD EZ CLN MOD 6.5IN

## (undated) DEVICE — WOUND RETRACTOR AND PROTECTOR: Brand: ALEXIS WOUND PROTECTOR-RETRACTOR

## (undated) DEVICE — SOL NACL 0.9PCT 1000ML

## (undated) DEVICE — SHEARS WITH ROTICULATOR TECHNOLOGY: Brand: ENDO MINI-SHEARS

## (undated) DEVICE — LOU THORACIC: Brand: MEDLINE INDUSTRIES, INC.

## (undated) DEVICE — SUT SILK 2/0 TIES 18IN A185H

## (undated) DEVICE — ROUND DISSECTORS: Brand: DEROYAL

## (undated) DEVICE — COAGULATOR SXN FTSWTCH 10F6IN

## (undated) DEVICE — UNDYED BRAIDED (POLYGLACTIN 910), SYNTHETIC ABSORBABLE SUTURE: Brand: COATED VICRYL

## (undated) DEVICE — ENDOPATH XCEL BLADELESS TROCARS WITH STABILITY SLEEVES: Brand: ENDOPATH XCEL

## (undated) DEVICE — SUT GUT CHRM 4/0 PS4 18IN 1643G BRN

## (undated) DEVICE — 2, DISPOSABLE SUCTION/IRRIGATOR WITH DISPOSABLE TIP: Brand: STRYKEFLOW

## (undated) DEVICE — ADHS SKIN SURG TISS VISC PREMIERPRO EXOFIN HI/VISC FAST/DRY

## (undated) DEVICE — SUT SILK 0 PSL 18IN 580H

## (undated) DEVICE — PROBE 8225825 3PK INCREMT STD PRASS ROHS

## (undated) DEVICE — DRN WND JP RND W TROC SIL 10F 1/8IN

## (undated) DEVICE — VISUALIZATION SYSTEM: Brand: CLEARIFY

## (undated) DEVICE — BNDG ELAS CO-FLEX SLF ADHR 6IN 5YD LF STRL

## (undated) DEVICE — SPNG GZ WOVN 4X4IN 12PLY 10/BX STRL

## (undated) DEVICE — SUT ETHLN 3-0 FS118IN 663H

## (undated) DEVICE — SYS PERFUS SEP PLATLT W TIPS CUST

## (undated) DEVICE — STANDARD HYPODERMIC NEEDLE,POLYPROPYLENE HUB: Brand: MONOJECT

## (undated) DEVICE — VAGINAL PREP TRAY: Brand: MEDLINE INDUSTRIES, INC.

## (undated) DEVICE — PATIENT RETURN ELECTRODE, SINGLE-USE, CONTACT QUALITY MONITORING, ADULT, WITH 9FT CORD, FOR PATIENTS WEIGING OVER 33LBS. (15KG): Brand: MEGADYNE

## (undated) DEVICE — GLV SURG PREMIERPRO ORTHO LTX PF SZ8 BRN

## (undated) DEVICE — NDL INJ UROL WILLIAMS CYSTO 3.7F 23G 8MM

## (undated) DEVICE — 3M™ STERI-STRIP™ REINFORCED ADHESIVE SKIN CLOSURES, R1547, 1/2 IN X 4 IN (12 MM X 100 MM), 6 STRIPS/ENVELOPE: Brand: 3M™ STERI-STRIP™

## (undated) DEVICE — GLV SURG BIOGEL LTX PF 8 1/2

## (undated) DEVICE — SUT PLAIN 1 4/0 1828H

## (undated) DEVICE — PAD GRND REM POLYHESIVE A/ DISP

## (undated) DEVICE — ENT-LF: Brand: MEDLINE INDUSTRIES, INC.

## (undated) DEVICE — TBG INSUFFLATION LUER LOCK: Brand: MEDLINE INDUSTRIES, INC.

## (undated) DEVICE — TOWEL,OR,DSP,ST,BLUE,STD,4/PK,20PK/CS: Brand: MEDLINE

## (undated) DEVICE — GAUZE,SPONGE,4"X4",32PLY,XRAY,STRL,LF: Brand: MEDLINE

## (undated) DEVICE — ELECTRD BLD EDGE/INSUL1P 2.4X5.1MM STRL